# Patient Record
Sex: MALE | Race: WHITE | Employment: OTHER | URBAN - METROPOLITAN AREA
[De-identification: names, ages, dates, MRNs, and addresses within clinical notes are randomized per-mention and may not be internally consistent; named-entity substitution may affect disease eponyms.]

---

## 2017-01-25 ENCOUNTER — OFFICE VISIT (OUTPATIENT)
Dept: FAMILY MEDICINE CLINIC | Age: 67
End: 2017-01-25

## 2017-01-25 VITALS
BODY MASS INDEX: 19.84 KG/M2 | RESPIRATION RATE: 16 BRPM | OXYGEN SATURATION: 92 % | HEIGHT: 70 IN | SYSTOLIC BLOOD PRESSURE: 124 MMHG | HEART RATE: 71 BPM | DIASTOLIC BLOOD PRESSURE: 82 MMHG | WEIGHT: 138.6 LBS

## 2017-01-25 DIAGNOSIS — F32.4 DEPRESSION, MAJOR, SINGLE EPISODE, IN PARTIAL REMISSION (HCC): ICD-10-CM

## 2017-01-25 DIAGNOSIS — F42.4 SKIN PICKING HABIT: ICD-10-CM

## 2017-01-25 DIAGNOSIS — F41.9 ANXIETY: Primary | ICD-10-CM

## 2017-01-25 DIAGNOSIS — F33.2 SEVERE RECURRENT MAJOR DEPRESSION WITHOUT PSYCHOTIC FEATURES (HCC): ICD-10-CM

## 2017-01-25 PROCEDURE — G8599 NO ASA/ANTIPLAT THER USE RNG: HCPCS | Performed by: NURSE PRACTITIONER

## 2017-01-25 PROCEDURE — 1123F ACP DISCUSS/DSCN MKR DOCD: CPT | Performed by: NURSE PRACTITIONER

## 2017-01-25 PROCEDURE — 1036F TOBACCO NON-USER: CPT | Performed by: NURSE PRACTITIONER

## 2017-01-25 PROCEDURE — 3017F COLORECTAL CA SCREEN DOC REV: CPT | Performed by: NURSE PRACTITIONER

## 2017-01-25 PROCEDURE — G8484 FLU IMMUNIZE NO ADMIN: HCPCS | Performed by: NURSE PRACTITIONER

## 2017-01-25 PROCEDURE — 99213 OFFICE O/P EST LOW 20 MIN: CPT | Performed by: NURSE PRACTITIONER

## 2017-01-25 PROCEDURE — 4040F PNEUMOC VAC/ADMIN/RCVD: CPT | Performed by: NURSE PRACTITIONER

## 2017-01-25 PROCEDURE — G8419 CALC BMI OUT NRM PARAM NOF/U: HCPCS | Performed by: NURSE PRACTITIONER

## 2017-01-25 PROCEDURE — G8427 DOCREV CUR MEDS BY ELIG CLIN: HCPCS | Performed by: NURSE PRACTITIONER

## 2017-01-25 RX ORDER — CYCLOBENZAPRINE HCL 10 MG
TABLET ORAL
Qty: 30 TABLET | Refills: 5 | Status: SHIPPED | OUTPATIENT
Start: 2017-01-25 | End: 2018-06-14 | Stop reason: SDUPTHER

## 2017-01-25 RX ORDER — LORAZEPAM 1 MG/1
1 TABLET ORAL EVERY 6 HOURS PRN
Qty: 30 TABLET | Refills: 2 | Status: CANCELLED | OUTPATIENT
Start: 2017-01-25 | End: 2017-02-24

## 2017-01-25 RX ORDER — HYDROXYZINE 50 MG/1
TABLET, FILM COATED ORAL
Qty: 30 TABLET | Refills: 5 | Status: SHIPPED | OUTPATIENT
Start: 2017-01-25 | End: 2017-07-07 | Stop reason: SDUPTHER

## 2017-01-25 RX ORDER — LORAZEPAM 1 MG/1
1 TABLET ORAL EVERY 6 HOURS PRN
Qty: 30 TABLET | Refills: 2 | Status: SHIPPED | OUTPATIENT
Start: 2017-01-25 | End: 2017-02-24

## 2017-01-25 RX ORDER — DULOXETIN HYDROCHLORIDE 60 MG/1
CAPSULE, DELAYED RELEASE ORAL
Qty: 60 CAPSULE | Refills: 5 | Status: SHIPPED | OUTPATIENT
Start: 2017-01-25 | End: 2017-02-24

## 2017-02-24 ENCOUNTER — OFFICE VISIT (OUTPATIENT)
Dept: FAMILY MEDICINE CLINIC | Age: 67
End: 2017-02-24

## 2017-02-24 VITALS
HEIGHT: 70 IN | RESPIRATION RATE: 16 BRPM | DIASTOLIC BLOOD PRESSURE: 62 MMHG | BODY MASS INDEX: 18.61 KG/M2 | SYSTOLIC BLOOD PRESSURE: 100 MMHG | WEIGHT: 130 LBS | HEART RATE: 53 BPM | OXYGEN SATURATION: 94 %

## 2017-02-24 DIAGNOSIS — I20.9 ANGINA PECTORIS (HCC): ICD-10-CM

## 2017-02-24 DIAGNOSIS — F33.2 SEVERE RECURRENT MAJOR DEPRESSION WITHOUT PSYCHOTIC FEATURES (HCC): ICD-10-CM

## 2017-02-24 DIAGNOSIS — F10.29 ALCOHOL DEPENDENCE WITH UNSPECIFIED ALCOHOL-INDUCED DISORDER (HCC): ICD-10-CM

## 2017-02-24 DIAGNOSIS — R10.9 RIGHT FLANK PAIN: ICD-10-CM

## 2017-02-24 DIAGNOSIS — M25.552 LEFT HIP PAIN: Primary | ICD-10-CM

## 2017-02-24 DIAGNOSIS — I25.10 CORONARY ARTERY DISEASE INVOLVING NATIVE HEART WITHOUT ANGINA PECTORIS, UNSPECIFIED VESSEL OR LESION TYPE: ICD-10-CM

## 2017-02-24 DIAGNOSIS — R35.0 URINE FREQUENCY: ICD-10-CM

## 2017-02-24 LAB
BILIRUBIN, POC: ABNORMAL
BLOOD URINE, POC: ABNORMAL
CLARITY, POC: CLEAR
COLOR, POC: YELLOW
GLUCOSE URINE, POC: ABNORMAL
KETONES, POC: ABNORMAL
LEUKOCYTE EST, POC: ABNORMAL
NITRITE, POC: ABNORMAL
PH, POC: 6
PROTEIN, POC: ABNORMAL
SPECIFIC GRAVITY, POC: >=1.03
UROBILINOGEN, POC: 0.2

## 2017-02-24 PROCEDURE — G8419 CALC BMI OUT NRM PARAM NOF/U: HCPCS | Performed by: NURSE PRACTITIONER

## 2017-02-24 PROCEDURE — 99214 OFFICE O/P EST MOD 30 MIN: CPT | Performed by: NURSE PRACTITIONER

## 2017-02-24 PROCEDURE — 1123F ACP DISCUSS/DSCN MKR DOCD: CPT | Performed by: NURSE PRACTITIONER

## 2017-02-24 PROCEDURE — G8599 NO ASA/ANTIPLAT THER USE RNG: HCPCS | Performed by: NURSE PRACTITIONER

## 2017-02-24 PROCEDURE — 4040F PNEUMOC VAC/ADMIN/RCVD: CPT | Performed by: NURSE PRACTITIONER

## 2017-02-24 PROCEDURE — G8427 DOCREV CUR MEDS BY ELIG CLIN: HCPCS | Performed by: NURSE PRACTITIONER

## 2017-02-24 PROCEDURE — 3017F COLORECTAL CA SCREEN DOC REV: CPT | Performed by: NURSE PRACTITIONER

## 2017-02-24 PROCEDURE — 1036F TOBACCO NON-USER: CPT | Performed by: NURSE PRACTITIONER

## 2017-02-24 PROCEDURE — 81002 URINALYSIS NONAUTO W/O SCOPE: CPT | Performed by: NURSE PRACTITIONER

## 2017-02-24 PROCEDURE — G8484 FLU IMMUNIZE NO ADMIN: HCPCS | Performed by: NURSE PRACTITIONER

## 2017-02-24 RX ORDER — VENLAFAXINE HYDROCHLORIDE 150 MG/1
150 CAPSULE, EXTENDED RELEASE ORAL DAILY
Qty: 30 CAPSULE | Refills: 2 | Status: SHIPPED | OUTPATIENT
Start: 2017-02-24 | End: 2017-06-20 | Stop reason: SDUPTHER

## 2017-02-24 RX ORDER — PREDNISONE 10 MG/1
TABLET ORAL
Qty: 30 TABLET | Refills: 0 | Status: ON HOLD | OUTPATIENT
Start: 2017-02-24 | End: 2017-03-21 | Stop reason: HOSPADM

## 2017-02-24 RX ORDER — OXYCODONE HYDROCHLORIDE AND ACETAMINOPHEN 5; 325 MG/1; MG/1
1 TABLET ORAL EVERY 4 HOURS PRN
Qty: 30 TABLET | Refills: 0 | Status: SHIPPED | OUTPATIENT
Start: 2017-02-24 | End: 2017-03-13 | Stop reason: SDUPTHER

## 2017-03-06 ENCOUNTER — TELEPHONE (OUTPATIENT)
Dept: ORTHOPEDIC SURGERY | Age: 67
End: 2017-03-06

## 2017-03-06 ENCOUNTER — OFFICE VISIT (OUTPATIENT)
Dept: ORTHOPEDIC SURGERY | Age: 67
End: 2017-03-06

## 2017-03-06 VITALS
HEART RATE: 50 BPM | SYSTOLIC BLOOD PRESSURE: 159 MMHG | WEIGHT: 135 LBS | DIASTOLIC BLOOD PRESSURE: 82 MMHG | BODY MASS INDEX: 19.33 KG/M2 | HEIGHT: 70 IN

## 2017-03-06 DIAGNOSIS — M54.10 RADICULAR PAIN: ICD-10-CM

## 2017-03-06 DIAGNOSIS — M25.552 LEFT HIP PAIN: Primary | ICD-10-CM

## 2017-03-06 PROCEDURE — G8419 CALC BMI OUT NRM PARAM NOF/U: HCPCS | Performed by: ORTHOPAEDIC SURGERY

## 2017-03-06 PROCEDURE — G8484 FLU IMMUNIZE NO ADMIN: HCPCS | Performed by: ORTHOPAEDIC SURGERY

## 2017-03-06 PROCEDURE — G8599 NO ASA/ANTIPLAT THER USE RNG: HCPCS | Performed by: ORTHOPAEDIC SURGERY

## 2017-03-06 PROCEDURE — 73502 X-RAY EXAM HIP UNI 2-3 VIEWS: CPT | Performed by: ORTHOPAEDIC SURGERY

## 2017-03-06 PROCEDURE — 99203 OFFICE O/P NEW LOW 30 MIN: CPT | Performed by: ORTHOPAEDIC SURGERY

## 2017-03-06 PROCEDURE — 1036F TOBACCO NON-USER: CPT | Performed by: ORTHOPAEDIC SURGERY

## 2017-03-06 PROCEDURE — 3017F COLORECTAL CA SCREEN DOC REV: CPT | Performed by: ORTHOPAEDIC SURGERY

## 2017-03-06 PROCEDURE — 4040F PNEUMOC VAC/ADMIN/RCVD: CPT | Performed by: ORTHOPAEDIC SURGERY

## 2017-03-06 PROCEDURE — 1123F ACP DISCUSS/DSCN MKR DOCD: CPT | Performed by: ORTHOPAEDIC SURGERY

## 2017-03-06 PROCEDURE — 72100 X-RAY EXAM L-S SPINE 2/3 VWS: CPT | Performed by: ORTHOPAEDIC SURGERY

## 2017-03-06 PROCEDURE — G8427 DOCREV CUR MEDS BY ELIG CLIN: HCPCS | Performed by: ORTHOPAEDIC SURGERY

## 2017-03-08 ENCOUNTER — HOSPITAL ENCOUNTER (OUTPATIENT)
Dept: MRI IMAGING | Age: 67
Discharge: HOME | End: 2017-03-08
Attending: ORTHOPAEDIC SURGERY | Admitting: ORTHOPAEDIC SURGERY

## 2017-03-08 ENCOUNTER — TELEPHONE (OUTPATIENT)
Dept: ORTHOPEDIC SURGERY | Age: 67
End: 2017-03-08

## 2017-03-08 DIAGNOSIS — C90.00 MULTIPLE MYELOMA NOT HAVING ACHIEVED REMISSION (HCC): ICD-10-CM

## 2017-03-08 DIAGNOSIS — M54.10 RADICULAR PAIN: ICD-10-CM

## 2017-03-08 DIAGNOSIS — C90.00 MULTIPLE MYELOMA, REMISSION STATUS UNSPECIFIED (HCC): Primary | ICD-10-CM

## 2017-03-08 DIAGNOSIS — M54.10 RADICULOPATHY: ICD-10-CM

## 2017-03-08 RX ORDER — ALENDRONATE SODIUM 70 MG/1
70 TABLET ORAL
Qty: 4 TABLET | Refills: 3 | Status: ON HOLD | OUTPATIENT
Start: 2017-03-08 | End: 2017-03-21 | Stop reason: HOSPADM

## 2017-03-09 ENCOUNTER — TELEPHONE (OUTPATIENT)
Dept: ORTHOPEDIC SURGERY | Age: 67
End: 2017-03-09

## 2017-03-13 ENCOUNTER — TELEPHONE (OUTPATIENT)
Dept: FAMILY MEDICINE CLINIC | Age: 67
End: 2017-03-13

## 2017-03-13 RX ORDER — OXYCODONE HYDROCHLORIDE AND ACETAMINOPHEN 5; 325 MG/1; MG/1
1 TABLET ORAL EVERY 4 HOURS PRN
Qty: 120 TABLET | Refills: 0 | Status: ON HOLD | OUTPATIENT
Start: 2017-03-13 | End: 2017-03-21 | Stop reason: HOSPADM

## 2017-03-14 ENCOUNTER — HOSPITAL ENCOUNTER (OUTPATIENT)
Dept: ULTRASOUND IMAGING | Age: 67
Discharge: HOME | End: 2017-03-14
Attending: ORTHOPAEDIC SURGERY | Admitting: ORTHOPAEDIC SURGERY

## 2017-03-14 DIAGNOSIS — C90.00 MULTIPLE MYELOMA, WITHOUT MENTION OF HAVING ACHIEVED REMISSION: ICD-10-CM

## 2017-03-14 DIAGNOSIS — C90.00 MULTIPLE MYELOMA, REMISSION STATUS UNSPECIFIED (HCC): ICD-10-CM

## 2017-03-14 DIAGNOSIS — N13.30 HYDRONEPHROSIS, RIGHT: ICD-10-CM

## 2017-03-14 DIAGNOSIS — C90.00 MULTIPLE MYELOMA NOT HAVING ACHIEVED REMISSION (HCC): ICD-10-CM

## 2017-03-16 PROBLEM — M89.8X9 BONE PAIN: Status: ACTIVE | Noted: 2017-03-16

## 2017-03-16 PROBLEM — M89.9 LYTIC BONE LESIONS ON XRAY: Status: ACTIVE | Noted: 2017-03-16

## 2017-03-16 PROBLEM — R63.4 ABNORMAL WEIGHT LOSS: Status: ACTIVE | Noted: 2017-03-16

## 2017-03-16 PROBLEM — M25.552 LEFT HIP PAIN: Status: ACTIVE | Noted: 2017-03-16

## 2017-03-16 PROBLEM — N13.30 HYDRONEPHROSIS OF RIGHT KIDNEY: Status: ACTIVE | Noted: 2017-03-16

## 2017-03-16 PROBLEM — R29.898 LEFT LEG WEAKNESS: Status: ACTIVE | Noted: 2017-03-16

## 2017-03-22 ENCOUNTER — CARE COORDINATION (OUTPATIENT)
Dept: CASE MANAGEMENT | Age: 67
End: 2017-03-22

## 2017-03-23 ENCOUNTER — CARE COORDINATION (OUTPATIENT)
Dept: CASE MANAGEMENT | Age: 67
End: 2017-03-23

## 2017-03-23 ENCOUNTER — TELEPHONE (OUTPATIENT)
Dept: PHARMACY | Age: 67
End: 2017-03-23

## 2017-03-24 ENCOUNTER — EPISODE CHANGES (OUTPATIENT)
Dept: CASE MANAGEMENT | Age: 67
End: 2017-03-24

## 2017-03-24 ENCOUNTER — CARE COORDINATION (OUTPATIENT)
Dept: CASE MANAGEMENT | Age: 67
End: 2017-03-24

## 2017-03-27 PROBLEM — C90.00 MULTIPLE MYELOMA NOT HAVING ACHIEVED REMISSION (HCC): Status: RESOLVED | Noted: 2017-03-08 | Resolved: 2017-03-27

## 2017-03-27 PROBLEM — C61 PROSTATE CANCER (HCC): Status: ACTIVE | Noted: 2017-03-27

## 2017-03-28 PROBLEM — C61 PROSTATE CANCER METASTATIC TO BONE (HCC): Status: ACTIVE | Noted: 2017-03-28

## 2017-03-28 PROBLEM — K59.03 DRUG-INDUCED CONSTIPATION: Status: ACTIVE | Noted: 2017-03-28

## 2017-03-28 PROBLEM — C79.51 PROSTATE CANCER METASTATIC TO BONE (HCC): Status: ACTIVE | Noted: 2017-03-28

## 2017-03-28 PROBLEM — R63.0 ANOREXIA: Status: ACTIVE | Noted: 2017-03-28

## 2017-03-28 PROBLEM — G89.3 NEOPLASM RELATED PAIN: Status: ACTIVE | Noted: 2017-03-28

## 2017-04-05 PROBLEM — C79.51 BONE METASTASIS (HCC): Status: ACTIVE | Noted: 2017-04-05

## 2017-04-08 ENCOUNTER — CARE COORDINATION (OUTPATIENT)
Dept: CASE MANAGEMENT | Age: 67
End: 2017-04-08

## 2017-04-09 ENCOUNTER — CARE COORDINATION (OUTPATIENT)
Dept: CASE MANAGEMENT | Age: 67
End: 2017-04-09

## 2017-04-10 ENCOUNTER — CARE COORDINATION (OUTPATIENT)
Dept: CASE MANAGEMENT | Age: 67
End: 2017-04-10

## 2017-04-10 ENCOUNTER — EPISODE CHANGES (OUTPATIENT)
Dept: CASE MANAGEMENT | Age: 67
End: 2017-04-10

## 2017-04-19 RX ORDER — ATORVASTATIN CALCIUM 40 MG/1
TABLET, FILM COATED ORAL
Qty: 30 TABLET | Refills: 2 | Status: SHIPPED | OUTPATIENT
Start: 2017-04-19 | End: 2017-08-02 | Stop reason: SDUPTHER

## 2017-04-20 ENCOUNTER — TELEPHONE (OUTPATIENT)
Dept: FAMILY MEDICINE CLINIC | Age: 67
End: 2017-04-20

## 2017-04-20 DIAGNOSIS — L29.9 PRURITUS: Primary | ICD-10-CM

## 2017-04-24 ENCOUNTER — HOSPITAL ENCOUNTER (OUTPATIENT)
Dept: OTHER | Age: 67
Discharge: HOME | End: 2017-04-24
Attending: INTERNAL MEDICINE | Admitting: INTERNAL MEDICINE

## 2017-04-24 DIAGNOSIS — R52 PAIN: ICD-10-CM

## 2017-04-24 DIAGNOSIS — C79.51 PROSTATE CANCER METASTATIC TO BONE (HCC): ICD-10-CM

## 2017-04-24 DIAGNOSIS — C61 PROSTATE CANCER METASTATIC TO BONE (HCC): ICD-10-CM

## 2017-04-25 ENCOUNTER — OFFICE VISIT (OUTPATIENT)
Dept: ORTHOPEDIC SURGERY | Age: 67
End: 2017-04-25

## 2017-04-25 VITALS
HEIGHT: 69 IN | HEART RATE: 76 BPM | DIASTOLIC BLOOD PRESSURE: 62 MMHG | WEIGHT: 129 LBS | SYSTOLIC BLOOD PRESSURE: 102 MMHG | BODY MASS INDEX: 19.11 KG/M2

## 2017-04-25 DIAGNOSIS — C79.51 PROSTATE CANCER METASTATIC TO BONE (HCC): ICD-10-CM

## 2017-04-25 DIAGNOSIS — C61 PROSTATE CANCER METASTATIC TO BONE (HCC): ICD-10-CM

## 2017-04-25 DIAGNOSIS — C79.51 BONE METASTASIS (HCC): Primary | ICD-10-CM

## 2017-04-25 PROCEDURE — G8427 DOCREV CUR MEDS BY ELIG CLIN: HCPCS | Performed by: ORTHOPAEDIC SURGERY

## 2017-04-25 PROCEDURE — 1111F DSCHRG MED/CURRENT MED MERGE: CPT | Performed by: ORTHOPAEDIC SURGERY

## 2017-04-25 PROCEDURE — 1123F ACP DISCUSS/DSCN MKR DOCD: CPT | Performed by: ORTHOPAEDIC SURGERY

## 2017-04-25 PROCEDURE — 99213 OFFICE O/P EST LOW 20 MIN: CPT | Performed by: ORTHOPAEDIC SURGERY

## 2017-04-25 PROCEDURE — 3017F COLORECTAL CA SCREEN DOC REV: CPT | Performed by: ORTHOPAEDIC SURGERY

## 2017-04-25 PROCEDURE — 4040F PNEUMOC VAC/ADMIN/RCVD: CPT | Performed by: ORTHOPAEDIC SURGERY

## 2017-04-25 PROCEDURE — G8419 CALC BMI OUT NRM PARAM NOF/U: HCPCS | Performed by: ORTHOPAEDIC SURGERY

## 2017-04-25 PROCEDURE — 1036F TOBACCO NON-USER: CPT | Performed by: ORTHOPAEDIC SURGERY

## 2017-04-25 PROCEDURE — G8598 ASA/ANTIPLAT THER USED: HCPCS | Performed by: ORTHOPAEDIC SURGERY

## 2017-05-23 PROBLEM — R53.1 WEAKNESS: Status: ACTIVE | Noted: 2017-05-23

## 2017-05-30 PROBLEM — R60.0 BILATERAL EDEMA OF LOWER EXTREMITY: Status: ACTIVE | Noted: 2017-05-30

## 2017-05-31 ENCOUNTER — HOSPITAL ENCOUNTER (OUTPATIENT)
Dept: VASCULAR LAB | Age: 67
Discharge: HOME | End: 2017-06-01
Attending: NURSE PRACTITIONER | Admitting: NURSE PRACTITIONER

## 2017-05-31 DIAGNOSIS — I83.893 VARICOSE VEINS OF BILATERAL LOWER EXTREMITIES WITH OTHER COMPLICATIONS: ICD-10-CM

## 2017-06-02 PROBLEM — R19.7 DIARRHEA: Status: ACTIVE | Noted: 2017-06-02

## 2017-06-07 ENCOUNTER — CARE COORDINATION (OUTPATIENT)
Dept: CASE MANAGEMENT | Age: 67
End: 2017-06-07

## 2017-06-08 ENCOUNTER — TELEPHONE (OUTPATIENT)
Dept: PHARMACY | Age: 67
End: 2017-06-08

## 2017-06-09 ENCOUNTER — CARE COORDINATION (OUTPATIENT)
Dept: CASE MANAGEMENT | Age: 67
End: 2017-06-09

## 2017-06-09 PROBLEM — K58.0 IRRITABLE BOWEL SYNDROME WITH DIARRHEA: Status: ACTIVE | Noted: 2017-06-09

## 2017-06-12 ENCOUNTER — HOSPITAL ENCOUNTER (OUTPATIENT)
Dept: VASCULAR LAB | Age: 67
Discharge: HOME | End: 2017-06-12
Attending: INTERNAL MEDICINE | Admitting: INTERNAL MEDICINE

## 2017-06-12 DIAGNOSIS — C61 MALIGNANT NEOPLASM OF PROSTATE (HCC): ICD-10-CM

## 2017-06-12 DIAGNOSIS — R60.0 BILATERAL EDEMA OF LOWER EXTREMITY: ICD-10-CM

## 2017-06-12 DIAGNOSIS — C61 PROSTATE CANCER (HCC): ICD-10-CM

## 2017-06-20 ENCOUNTER — TELEPHONE (OUTPATIENT)
Dept: FAMILY MEDICINE CLINIC | Age: 67
End: 2017-06-20

## 2017-06-20 RX ORDER — VENLAFAXINE HYDROCHLORIDE 150 MG/1
150 CAPSULE, EXTENDED RELEASE ORAL 2 TIMES DAILY
Qty: 60 CAPSULE | Refills: 0 | Status: SHIPPED | OUTPATIENT
Start: 2017-06-20 | End: 2017-07-07 | Stop reason: DRUGHIGH

## 2017-06-30 ENCOUNTER — OFFICE VISIT (OUTPATIENT)
Dept: VASCULAR SURGERY | Age: 67
End: 2017-06-30

## 2017-06-30 VITALS
DIASTOLIC BLOOD PRESSURE: 58 MMHG | WEIGHT: 118 LBS | BODY MASS INDEX: 17.48 KG/M2 | SYSTOLIC BLOOD PRESSURE: 124 MMHG | HEIGHT: 69 IN

## 2017-06-30 DIAGNOSIS — R60.0 BILATERAL LEG EDEMA: Primary | ICD-10-CM

## 2017-06-30 PROBLEM — R94.31 PROLONGED Q-T INTERVAL ON ECG: Status: ACTIVE | Noted: 2017-06-01

## 2017-06-30 PROCEDURE — G8598 ASA/ANTIPLAT THER USED: HCPCS | Performed by: SURGERY

## 2017-06-30 PROCEDURE — 1123F ACP DISCUSS/DSCN MKR DOCD: CPT | Performed by: SURGERY

## 2017-06-30 PROCEDURE — G8427 DOCREV CUR MEDS BY ELIG CLIN: HCPCS | Performed by: SURGERY

## 2017-06-30 PROCEDURE — 1111F DSCHRG MED/CURRENT MED MERGE: CPT | Performed by: SURGERY

## 2017-06-30 PROCEDURE — 4040F PNEUMOC VAC/ADMIN/RCVD: CPT | Performed by: SURGERY

## 2017-06-30 PROCEDURE — 99203 OFFICE O/P NEW LOW 30 MIN: CPT | Performed by: SURGERY

## 2017-06-30 PROCEDURE — 1036F TOBACCO NON-USER: CPT | Performed by: SURGERY

## 2017-06-30 PROCEDURE — 3017F COLORECTAL CA SCREEN DOC REV: CPT | Performed by: SURGERY

## 2017-06-30 PROCEDURE — G8419 CALC BMI OUT NRM PARAM NOF/U: HCPCS | Performed by: SURGERY

## 2017-07-05 ENCOUNTER — TELEPHONE (OUTPATIENT)
Dept: CARDIOLOGY CLINIC | Age: 67
End: 2017-07-05

## 2017-07-07 ENCOUNTER — OFFICE VISIT (OUTPATIENT)
Dept: FAMILY MEDICINE CLINIC | Age: 67
End: 2017-07-07

## 2017-07-07 VITALS
BODY MASS INDEX: 17.77 KG/M2 | SYSTOLIC BLOOD PRESSURE: 122 MMHG | WEIGHT: 120 LBS | DIASTOLIC BLOOD PRESSURE: 80 MMHG | HEART RATE: 76 BPM | RESPIRATION RATE: 16 BRPM | OXYGEN SATURATION: 98 % | HEIGHT: 69 IN

## 2017-07-07 DIAGNOSIS — R06.02 SHORTNESS OF BREATH: ICD-10-CM

## 2017-07-07 DIAGNOSIS — F32.2 SEVERE MAJOR DEPRESSION (HCC): Primary | ICD-10-CM

## 2017-07-07 DIAGNOSIS — F41.9 ANXIETY: ICD-10-CM

## 2017-07-07 DIAGNOSIS — C61 PROSTATE CANCER (HCC): ICD-10-CM

## 2017-07-07 DIAGNOSIS — F42.4 SKIN PICKING HABIT: ICD-10-CM

## 2017-07-07 DIAGNOSIS — F10.29 ALCOHOL DEPENDENCE WITH UNSPECIFIED ALCOHOL-INDUCED DISORDER (HCC): ICD-10-CM

## 2017-07-07 PROCEDURE — G8598 ASA/ANTIPLAT THER USED: HCPCS | Performed by: NURSE PRACTITIONER

## 2017-07-07 PROCEDURE — 99215 OFFICE O/P EST HI 40 MIN: CPT | Performed by: NURSE PRACTITIONER

## 2017-07-07 PROCEDURE — 4040F PNEUMOC VAC/ADMIN/RCVD: CPT | Performed by: NURSE PRACTITIONER

## 2017-07-07 PROCEDURE — G8419 CALC BMI OUT NRM PARAM NOF/U: HCPCS | Performed by: NURSE PRACTITIONER

## 2017-07-07 PROCEDURE — G8427 DOCREV CUR MEDS BY ELIG CLIN: HCPCS | Performed by: NURSE PRACTITIONER

## 2017-07-07 PROCEDURE — 1123F ACP DISCUSS/DSCN MKR DOCD: CPT | Performed by: NURSE PRACTITIONER

## 2017-07-07 PROCEDURE — 3017F COLORECTAL CA SCREEN DOC REV: CPT | Performed by: NURSE PRACTITIONER

## 2017-07-07 PROCEDURE — 4004F PT TOBACCO SCREEN RCVD TLK: CPT | Performed by: NURSE PRACTITIONER

## 2017-07-07 RX ORDER — DOCUSATE SODIUM 100 MG/1
100 CAPSULE, LIQUID FILLED ORAL DAILY PRN
COMMUNITY
End: 2017-07-11 | Stop reason: ALTCHOICE

## 2017-07-07 RX ORDER — HYDROXYZINE 50 MG/1
50 TABLET, FILM COATED ORAL EVERY 6 HOURS PRN
Qty: 60 TABLET | Refills: 5 | Status: SHIPPED | OUTPATIENT
Start: 2017-07-07 | End: 2017-07-12 | Stop reason: SDUPTHER

## 2017-07-07 RX ORDER — VENLAFAXINE HYDROCHLORIDE 150 MG/1
300 CAPSULE, EXTENDED RELEASE ORAL DAILY
Qty: 60 CAPSULE | Refills: 0 | Status: SHIPPED
Start: 2017-07-07 | End: 2017-07-25 | Stop reason: SDUPTHER

## 2017-07-07 RX ORDER — ARIPIPRAZOLE 10 MG/1
10 TABLET ORAL DAILY
Qty: 30 TABLET | Refills: 3 | Status: SHIPPED | OUTPATIENT
Start: 2017-07-07 | End: 2017-10-27 | Stop reason: SDUPTHER

## 2017-07-11 ENCOUNTER — TELEPHONE (OUTPATIENT)
Dept: FAMILY MEDICINE CLINIC | Age: 67
End: 2017-07-11

## 2017-07-11 ENCOUNTER — OFFICE VISIT (OUTPATIENT)
Dept: ENT CLINIC | Age: 67
End: 2017-07-11

## 2017-07-11 VITALS
HEIGHT: 69 IN | WEIGHT: 120 LBS | BODY MASS INDEX: 17.77 KG/M2 | HEART RATE: 58 BPM | DIASTOLIC BLOOD PRESSURE: 74 MMHG | SYSTOLIC BLOOD PRESSURE: 126 MMHG

## 2017-07-11 DIAGNOSIS — H93.13 SUBJECTIVE TINNITUS OF BOTH EARS: Primary | ICD-10-CM

## 2017-07-11 DIAGNOSIS — F42.4 SKIN PICKING HABIT: ICD-10-CM

## 2017-07-11 DIAGNOSIS — H91.93 HEARING LOSS OF BOTH EARS: ICD-10-CM

## 2017-07-11 PROCEDURE — 99203 OFFICE O/P NEW LOW 30 MIN: CPT | Performed by: OTOLARYNGOLOGY

## 2017-07-11 PROCEDURE — G8598 ASA/ANTIPLAT THER USED: HCPCS | Performed by: OTOLARYNGOLOGY

## 2017-07-11 PROCEDURE — 4040F PNEUMOC VAC/ADMIN/RCVD: CPT | Performed by: OTOLARYNGOLOGY

## 2017-07-11 PROCEDURE — G8427 DOCREV CUR MEDS BY ELIG CLIN: HCPCS | Performed by: OTOLARYNGOLOGY

## 2017-07-11 PROCEDURE — G8419 CALC BMI OUT NRM PARAM NOF/U: HCPCS | Performed by: OTOLARYNGOLOGY

## 2017-07-11 PROCEDURE — 1123F ACP DISCUSS/DSCN MKR DOCD: CPT | Performed by: OTOLARYNGOLOGY

## 2017-07-11 PROCEDURE — 3017F COLORECTAL CA SCREEN DOC REV: CPT | Performed by: OTOLARYNGOLOGY

## 2017-07-11 PROCEDURE — 4004F PT TOBACCO SCREEN RCVD TLK: CPT | Performed by: OTOLARYNGOLOGY

## 2017-07-12 RX ORDER — HYDROXYZINE 50 MG/1
50 TABLET, FILM COATED ORAL EVERY 6 HOURS PRN
Qty: 60 TABLET | Refills: 5 | Status: SHIPPED | OUTPATIENT
Start: 2017-07-12 | End: 2019-01-01 | Stop reason: ALTCHOICE

## 2017-07-13 ENCOUNTER — OFFICE VISIT (OUTPATIENT)
Dept: ENT CLINIC | Age: 67
End: 2017-07-13

## 2017-07-13 DIAGNOSIS — H93.13 TINNITUS OF BOTH EARS: Primary | ICD-10-CM

## 2017-07-13 PROCEDURE — 99999 PR OFFICE/OUTPT VISIT,PROCEDURE ONLY: CPT | Performed by: AUDIOLOGIST

## 2017-07-13 PROCEDURE — 4004F PT TOBACCO SCREEN RCVD TLK: CPT | Performed by: AUDIOLOGIST

## 2017-07-13 PROCEDURE — 92557 COMPREHENSIVE HEARING TEST: CPT | Performed by: AUDIOLOGIST

## 2017-07-13 PROCEDURE — 92550 TYMPANOMETRY & REFLEX THRESH: CPT | Performed by: AUDIOLOGIST

## 2017-07-14 ENCOUNTER — OFFICE VISIT (OUTPATIENT)
Dept: ENT CLINIC | Age: 67
End: 2017-07-14

## 2017-07-14 VITALS
WEIGHT: 120 LBS | HEIGHT: 69 IN | DIASTOLIC BLOOD PRESSURE: 83 MMHG | BODY MASS INDEX: 17.77 KG/M2 | SYSTOLIC BLOOD PRESSURE: 123 MMHG | HEART RATE: 98 BPM

## 2017-07-14 DIAGNOSIS — H90.3 BILATERAL SENSORINEURAL HEARING LOSS: ICD-10-CM

## 2017-07-14 DIAGNOSIS — H93.13 TINNITUS OF BOTH EARS: Primary | ICD-10-CM

## 2017-07-14 PROBLEM — F41.9 ANXIETY: Status: ACTIVE | Noted: 2017-07-14

## 2017-07-14 PROCEDURE — G8419 CALC BMI OUT NRM PARAM NOF/U: HCPCS | Performed by: OTOLARYNGOLOGY

## 2017-07-14 PROCEDURE — 4004F PT TOBACCO SCREEN RCVD TLK: CPT | Performed by: OTOLARYNGOLOGY

## 2017-07-14 PROCEDURE — 99214 OFFICE O/P EST MOD 30 MIN: CPT | Performed by: OTOLARYNGOLOGY

## 2017-07-14 PROCEDURE — 1123F ACP DISCUSS/DSCN MKR DOCD: CPT | Performed by: OTOLARYNGOLOGY

## 2017-07-14 PROCEDURE — 4040F PNEUMOC VAC/ADMIN/RCVD: CPT | Performed by: OTOLARYNGOLOGY

## 2017-07-14 PROCEDURE — 3017F COLORECTAL CA SCREEN DOC REV: CPT | Performed by: OTOLARYNGOLOGY

## 2017-07-14 PROCEDURE — G8598 ASA/ANTIPLAT THER USED: HCPCS | Performed by: OTOLARYNGOLOGY

## 2017-07-14 PROCEDURE — G8427 DOCREV CUR MEDS BY ELIG CLIN: HCPCS | Performed by: OTOLARYNGOLOGY

## 2017-07-21 ENCOUNTER — HOSPITAL ENCOUNTER (OUTPATIENT)
Dept: NUCLEAR MEDICINE | Age: 67
Discharge: HOME | End: 2017-07-21
Attending: INTERNAL MEDICINE | Admitting: INTERNAL MEDICINE

## 2017-07-21 DIAGNOSIS — C79.51 BONE METASTASIS (HCC): ICD-10-CM

## 2017-07-21 DIAGNOSIS — I83.893 VARICOSE VEINS OF BILATERAL LOWER EXTREMITIES WITH OTHER COMPLICATIONS: ICD-10-CM

## 2017-07-21 DIAGNOSIS — C61 PROSTATE CANCER (HCC): ICD-10-CM

## 2017-07-21 RX ORDER — TC 99M MEDRONATE 20 MG/10ML
25 INJECTION, POWDER, LYOPHILIZED, FOR SOLUTION INTRAVENOUS
Status: COMPLETED | OUTPATIENT
Start: 2017-07-21 | End: 2017-07-21

## 2017-07-21 RX ADMIN — TC 99M MEDRONATE 25 MILLICURIE: 20 INJECTION, POWDER, LYOPHILIZED, FOR SOLUTION INTRAVENOUS at 09:59

## 2017-07-25 ENCOUNTER — TELEPHONE (OUTPATIENT)
Dept: FAMILY MEDICINE CLINIC | Age: 67
End: 2017-07-25

## 2017-07-25 RX ORDER — VENLAFAXINE HYDROCHLORIDE 150 MG/1
300 CAPSULE, EXTENDED RELEASE ORAL DAILY
Qty: 60 CAPSULE | Refills: 0 | Status: SHIPPED | OUTPATIENT
Start: 2017-07-25 | End: 2017-08-22 | Stop reason: SDUPTHER

## 2017-07-26 ENCOUNTER — CARE COORDINATION (OUTPATIENT)
Dept: CARE COORDINATION | Age: 67
End: 2017-07-26

## 2017-08-02 ENCOUNTER — TELEPHONE (OUTPATIENT)
Dept: FAMILY MEDICINE CLINIC | Age: 67
End: 2017-08-02

## 2017-08-02 RX ORDER — ABIRATERONE ACETATE 250 MG/1
1000 TABLET ORAL DAILY
Qty: 120 TABLET | Refills: 3 | Status: CANCELLED | OUTPATIENT
Start: 2017-08-02

## 2017-08-02 RX ORDER — ATORVASTATIN CALCIUM 40 MG/1
TABLET, FILM COATED ORAL
Qty: 30 TABLET | Refills: 5 | Status: SHIPPED | OUTPATIENT
Start: 2017-08-02 | End: 2017-08-17 | Stop reason: SDUPTHER

## 2017-08-04 ENCOUNTER — OFFICE VISIT (OUTPATIENT)
Dept: FAMILY MEDICINE CLINIC | Age: 67
End: 2017-08-04

## 2017-08-04 VITALS
BODY MASS INDEX: 18.78 KG/M2 | WEIGHT: 126.8 LBS | RESPIRATION RATE: 16 BRPM | DIASTOLIC BLOOD PRESSURE: 78 MMHG | HEART RATE: 70 BPM | HEIGHT: 69 IN | OXYGEN SATURATION: 97 % | SYSTOLIC BLOOD PRESSURE: 112 MMHG

## 2017-08-04 DIAGNOSIS — C61 PROSTATE CANCER (HCC): ICD-10-CM

## 2017-08-04 DIAGNOSIS — N52.2 DRUG-INDUCED ERECTILE DYSFUNCTION: ICD-10-CM

## 2017-08-04 DIAGNOSIS — M89.8X9 BONE PAIN: ICD-10-CM

## 2017-08-04 DIAGNOSIS — F10.29 ALCOHOL DEPENDENCE WITH UNSPECIFIED ALCOHOL-INDUCED DISORDER (HCC): ICD-10-CM

## 2017-08-04 DIAGNOSIS — G89.3 NEOPLASM RELATED PAIN: ICD-10-CM

## 2017-08-04 DIAGNOSIS — F33.2 SEVERE RECURRENT MAJOR DEPRESSION WITHOUT PSYCHOTIC FEATURES (HCC): Primary | ICD-10-CM

## 2017-08-04 PROCEDURE — G8427 DOCREV CUR MEDS BY ELIG CLIN: HCPCS | Performed by: NURSE PRACTITIONER

## 2017-08-04 PROCEDURE — 1123F ACP DISCUSS/DSCN MKR DOCD: CPT | Performed by: NURSE PRACTITIONER

## 2017-08-04 PROCEDURE — G8598 ASA/ANTIPLAT THER USED: HCPCS | Performed by: NURSE PRACTITIONER

## 2017-08-04 PROCEDURE — G8420 CALC BMI NORM PARAMETERS: HCPCS | Performed by: NURSE PRACTITIONER

## 2017-08-04 PROCEDURE — 3017F COLORECTAL CA SCREEN DOC REV: CPT | Performed by: NURSE PRACTITIONER

## 2017-08-04 PROCEDURE — 4040F PNEUMOC VAC/ADMIN/RCVD: CPT | Performed by: NURSE PRACTITIONER

## 2017-08-04 PROCEDURE — 4004F PT TOBACCO SCREEN RCVD TLK: CPT | Performed by: NURSE PRACTITIONER

## 2017-08-04 PROCEDURE — 99214 OFFICE O/P EST MOD 30 MIN: CPT | Performed by: NURSE PRACTITIONER

## 2017-08-07 ENCOUNTER — TELEPHONE (OUTPATIENT)
Dept: ORTHOPEDIC SURGERY | Age: 67
End: 2017-08-07

## 2017-08-17 ENCOUNTER — OFFICE VISIT (OUTPATIENT)
Dept: CARDIOLOGY CLINIC | Age: 67
End: 2017-08-17

## 2017-08-17 VITALS
HEART RATE: 67 BPM | DIASTOLIC BLOOD PRESSURE: 86 MMHG | SYSTOLIC BLOOD PRESSURE: 134 MMHG | BODY MASS INDEX: 19.55 KG/M2 | WEIGHT: 132 LBS | OXYGEN SATURATION: 92 % | HEIGHT: 69 IN

## 2017-08-17 DIAGNOSIS — I25.810 CORONARY ARTERY DISEASE INVOLVING CORONARY BYPASS GRAFT OF NATIVE HEART WITHOUT ANGINA PECTORIS: Primary | ICD-10-CM

## 2017-08-17 DIAGNOSIS — Q21.12 PFO (PATENT FORAMEN OVALE): ICD-10-CM

## 2017-08-17 DIAGNOSIS — E78.2 MIXED HYPERLIPIDEMIA: ICD-10-CM

## 2017-08-17 DIAGNOSIS — Z86.73 HISTORY OF TIA (TRANSIENT ISCHEMIC ATTACK): ICD-10-CM

## 2017-08-17 DIAGNOSIS — F10.11 HISTORY OF ALCOHOL ABUSE: ICD-10-CM

## 2017-08-17 DIAGNOSIS — I10 ESSENTIAL HYPERTENSION: ICD-10-CM

## 2017-08-17 PROCEDURE — 1123F ACP DISCUSS/DSCN MKR DOCD: CPT | Performed by: INTERNAL MEDICINE

## 2017-08-17 PROCEDURE — G8420 CALC BMI NORM PARAMETERS: HCPCS | Performed by: INTERNAL MEDICINE

## 2017-08-17 PROCEDURE — 93000 ELECTROCARDIOGRAM COMPLETE: CPT | Performed by: INTERNAL MEDICINE

## 2017-08-17 PROCEDURE — 3017F COLORECTAL CA SCREEN DOC REV: CPT | Performed by: INTERNAL MEDICINE

## 2017-08-17 PROCEDURE — 99214 OFFICE O/P EST MOD 30 MIN: CPT | Performed by: INTERNAL MEDICINE

## 2017-08-17 PROCEDURE — G8427 DOCREV CUR MEDS BY ELIG CLIN: HCPCS | Performed by: INTERNAL MEDICINE

## 2017-08-17 PROCEDURE — 4040F PNEUMOC VAC/ADMIN/RCVD: CPT | Performed by: INTERNAL MEDICINE

## 2017-08-17 PROCEDURE — G8598 ASA/ANTIPLAT THER USED: HCPCS | Performed by: INTERNAL MEDICINE

## 2017-08-17 PROCEDURE — 4004F PT TOBACCO SCREEN RCVD TLK: CPT | Performed by: INTERNAL MEDICINE

## 2017-08-17 RX ORDER — ATORVASTATIN CALCIUM 40 MG/1
40 TABLET, FILM COATED ORAL DAILY
Qty: 90 TABLET | Refills: 0 | Status: SHIPPED | OUTPATIENT
Start: 2017-08-17 | End: 2017-11-13 | Stop reason: ALTCHOICE

## 2017-08-17 RX ORDER — ATORVASTATIN CALCIUM 20 MG/1
TABLET, FILM COATED ORAL
Qty: 90 TABLET | Refills: 3 | Status: SHIPPED | OUTPATIENT
Start: 2017-08-17 | End: 2017-09-13 | Stop reason: ALTCHOICE

## 2017-08-18 ENCOUNTER — OFFICE VISIT (OUTPATIENT)
Dept: ORTHOPEDIC SURGERY | Age: 67
End: 2017-08-18

## 2017-08-18 VITALS
WEIGHT: 132 LBS | HEART RATE: 71 BPM | HEIGHT: 69 IN | DIASTOLIC BLOOD PRESSURE: 76 MMHG | BODY MASS INDEX: 19.55 KG/M2 | SYSTOLIC BLOOD PRESSURE: 134 MMHG

## 2017-08-18 DIAGNOSIS — C79.51 BONE METASTASIS (HCC): Primary | ICD-10-CM

## 2017-08-18 DIAGNOSIS — C79.51 PROSTATE CANCER METASTATIC TO BONE (HCC): ICD-10-CM

## 2017-08-18 DIAGNOSIS — C61 PROSTATE CANCER METASTATIC TO BONE (HCC): ICD-10-CM

## 2017-08-18 PROCEDURE — 4004F PT TOBACCO SCREEN RCVD TLK: CPT | Performed by: ORTHOPAEDIC SURGERY

## 2017-08-18 PROCEDURE — G8420 CALC BMI NORM PARAMETERS: HCPCS | Performed by: ORTHOPAEDIC SURGERY

## 2017-08-18 PROCEDURE — 99213 OFFICE O/P EST LOW 20 MIN: CPT | Performed by: ORTHOPAEDIC SURGERY

## 2017-08-18 PROCEDURE — G8427 DOCREV CUR MEDS BY ELIG CLIN: HCPCS | Performed by: ORTHOPAEDIC SURGERY

## 2017-08-18 PROCEDURE — G8598 ASA/ANTIPLAT THER USED: HCPCS | Performed by: ORTHOPAEDIC SURGERY

## 2017-08-18 PROCEDURE — 1123F ACP DISCUSS/DSCN MKR DOCD: CPT | Performed by: ORTHOPAEDIC SURGERY

## 2017-08-18 PROCEDURE — 3017F COLORECTAL CA SCREEN DOC REV: CPT | Performed by: ORTHOPAEDIC SURGERY

## 2017-08-18 PROCEDURE — 4040F PNEUMOC VAC/ADMIN/RCVD: CPT | Performed by: ORTHOPAEDIC SURGERY

## 2017-08-22 RX ORDER — VENLAFAXINE HYDROCHLORIDE 150 MG/1
300 CAPSULE, EXTENDED RELEASE ORAL DAILY
Qty: 60 CAPSULE | Refills: 0 | Status: SHIPPED | OUTPATIENT
Start: 2017-08-22 | End: 2017-09-22 | Stop reason: SDUPTHER

## 2017-08-23 ENCOUNTER — TELEPHONE (OUTPATIENT)
Dept: ORTHOPEDIC SURGERY | Age: 67
End: 2017-08-23

## 2017-08-29 ENCOUNTER — TELEPHONE (OUTPATIENT)
Dept: CARDIOLOGY CLINIC | Age: 67
End: 2017-08-29

## 2017-08-31 ENCOUNTER — TELEPHONE (OUTPATIENT)
Dept: CARDIOLOGY CLINIC | Age: 67
End: 2017-08-31

## 2017-09-05 ENCOUNTER — TELEPHONE (OUTPATIENT)
Dept: CARDIOLOGY CLINIC | Age: 67
End: 2017-09-05

## 2017-09-13 ENCOUNTER — OFFICE VISIT (OUTPATIENT)
Dept: FAMILY MEDICINE CLINIC | Age: 67
End: 2017-09-13

## 2017-09-13 VITALS
HEART RATE: 79 BPM | RESPIRATION RATE: 16 BRPM | BODY MASS INDEX: 21.83 KG/M2 | DIASTOLIC BLOOD PRESSURE: 78 MMHG | OXYGEN SATURATION: 98 % | TEMPERATURE: 98.5 F | WEIGHT: 147.4 LBS | HEIGHT: 69 IN | SYSTOLIC BLOOD PRESSURE: 122 MMHG

## 2017-09-13 DIAGNOSIS — R06.02 SOB (SHORTNESS OF BREATH): ICD-10-CM

## 2017-09-13 DIAGNOSIS — R19.7 DIARRHEA, UNSPECIFIED TYPE: ICD-10-CM

## 2017-09-13 DIAGNOSIS — Z95.1 S/P CABG X 3: ICD-10-CM

## 2017-09-13 DIAGNOSIS — I25.10 CORONARY ARTERY DISEASE INVOLVING NATIVE CORONARY ARTERY OF NATIVE HEART WITHOUT ANGINA PECTORIS: ICD-10-CM

## 2017-09-13 DIAGNOSIS — C79.51 PROSTATE CANCER METASTATIC TO BONE (HCC): Primary | ICD-10-CM

## 2017-09-13 DIAGNOSIS — J98.4 RESTRICTIVE LUNG DISEASE: ICD-10-CM

## 2017-09-13 DIAGNOSIS — F33.2 SEVERE RECURRENT MAJOR DEPRESSION WITHOUT PSYCHOTIC FEATURES (HCC): ICD-10-CM

## 2017-09-13 DIAGNOSIS — C61 PROSTATE CANCER METASTATIC TO BONE (HCC): Primary | ICD-10-CM

## 2017-09-13 DIAGNOSIS — I10 ESSENTIAL HYPERTENSION: ICD-10-CM

## 2017-09-13 DIAGNOSIS — Q21.12 PATENT FORAMEN OVALE: ICD-10-CM

## 2017-09-13 PROCEDURE — 3017F COLORECTAL CA SCREEN DOC REV: CPT | Performed by: FAMILY MEDICINE

## 2017-09-13 PROCEDURE — 1123F ACP DISCUSS/DSCN MKR DOCD: CPT | Performed by: FAMILY MEDICINE

## 2017-09-13 PROCEDURE — 4040F PNEUMOC VAC/ADMIN/RCVD: CPT | Performed by: FAMILY MEDICINE

## 2017-09-13 PROCEDURE — 1111F DSCHRG MED/CURRENT MED MERGE: CPT | Performed by: FAMILY MEDICINE

## 2017-09-13 PROCEDURE — G8420 CALC BMI NORM PARAMETERS: HCPCS | Performed by: FAMILY MEDICINE

## 2017-09-13 PROCEDURE — 4004F PT TOBACCO SCREEN RCVD TLK: CPT | Performed by: FAMILY MEDICINE

## 2017-09-13 PROCEDURE — G8427 DOCREV CUR MEDS BY ELIG CLIN: HCPCS | Performed by: FAMILY MEDICINE

## 2017-09-13 PROCEDURE — G8598 ASA/ANTIPLAT THER USED: HCPCS | Performed by: FAMILY MEDICINE

## 2017-09-13 PROCEDURE — 94010 BREATHING CAPACITY TEST: CPT | Performed by: FAMILY MEDICINE

## 2017-09-13 PROCEDURE — 99214 OFFICE O/P EST MOD 30 MIN: CPT | Performed by: FAMILY MEDICINE

## 2017-09-18 ENCOUNTER — OFFICE VISIT (OUTPATIENT)
Dept: CARDIOLOGY CLINIC | Age: 67
End: 2017-09-18

## 2017-09-18 VITALS
DIASTOLIC BLOOD PRESSURE: 78 MMHG | SYSTOLIC BLOOD PRESSURE: 128 MMHG | WEIGHT: 143 LBS | HEIGHT: 69 IN | HEART RATE: 69 BPM | BODY MASS INDEX: 21.18 KG/M2

## 2017-09-18 DIAGNOSIS — R07.89 OTHER CHEST PAIN: ICD-10-CM

## 2017-09-18 DIAGNOSIS — Z95.1 S/P CABG X 3: ICD-10-CM

## 2017-09-18 DIAGNOSIS — E78.2 MIXED HYPERLIPIDEMIA: ICD-10-CM

## 2017-09-18 DIAGNOSIS — R94.31 PROLONGED Q-T INTERVAL ON ECG: ICD-10-CM

## 2017-09-18 DIAGNOSIS — Z01.810 PREOP CARDIOVASCULAR EXAM: Primary | ICD-10-CM

## 2017-09-18 DIAGNOSIS — I10 ESSENTIAL HYPERTENSION: ICD-10-CM

## 2017-09-18 DIAGNOSIS — I25.119 CORONARY ARTERY DISEASE INVOLVING NATIVE CORONARY ARTERY OF NATIVE HEART WITH ANGINA PECTORIS (HCC): ICD-10-CM

## 2017-09-18 PROCEDURE — G8420 CALC BMI NORM PARAMETERS: HCPCS | Performed by: INTERNAL MEDICINE

## 2017-09-18 PROCEDURE — 4040F PNEUMOC VAC/ADMIN/RCVD: CPT | Performed by: INTERNAL MEDICINE

## 2017-09-18 PROCEDURE — G8427 DOCREV CUR MEDS BY ELIG CLIN: HCPCS | Performed by: INTERNAL MEDICINE

## 2017-09-18 PROCEDURE — 93000 ELECTROCARDIOGRAM COMPLETE: CPT | Performed by: INTERNAL MEDICINE

## 2017-09-18 PROCEDURE — 4004F PT TOBACCO SCREEN RCVD TLK: CPT | Performed by: INTERNAL MEDICINE

## 2017-09-18 PROCEDURE — 3017F COLORECTAL CA SCREEN DOC REV: CPT | Performed by: INTERNAL MEDICINE

## 2017-09-18 PROCEDURE — G8598 ASA/ANTIPLAT THER USED: HCPCS | Performed by: INTERNAL MEDICINE

## 2017-09-18 PROCEDURE — 1123F ACP DISCUSS/DSCN MKR DOCD: CPT | Performed by: INTERNAL MEDICINE

## 2017-09-18 PROCEDURE — 99215 OFFICE O/P EST HI 40 MIN: CPT | Performed by: INTERNAL MEDICINE

## 2017-09-18 PROCEDURE — 1111F DSCHRG MED/CURRENT MED MERGE: CPT | Performed by: INTERNAL MEDICINE

## 2017-09-18 RX ORDER — PREDNISONE 1 MG/1
5 TABLET ORAL 2 TIMES DAILY
COMMUNITY
End: 2018-08-24

## 2017-09-19 ENCOUNTER — HOSPITAL ENCOUNTER (OUTPATIENT)
Dept: NON INVASIVE DIAGNOSTICS | Age: 67
Discharge: HOME | End: 2017-09-19
Attending: INTERNAL MEDICINE | Admitting: INTERNAL MEDICINE

## 2017-09-19 DIAGNOSIS — Z01.810 ENCOUNTER FOR PREPROCEDURAL CARDIOVASCULAR EXAMINATION: ICD-10-CM

## 2017-09-19 LAB
LV EF: 65 %
LVEF MODALITY: NORMAL

## 2017-09-19 RX ORDER — AMINOPHYLLINE DIHYDRATE 25 MG/ML
100 INJECTION, SOLUTION INTRAVENOUS ONCE
Status: COMPLETED | OUTPATIENT
Start: 2017-09-19 | End: 2017-09-19

## 2017-09-19 RX ADMIN — AMINOPHYLLINE DIHYDRATE 100 MG: 25 INJECTION, SOLUTION INTRAVENOUS at 14:43

## 2017-09-21 PROBLEM — C79.51 METASTASIS TO BONE (HCC): Status: ACTIVE | Noted: 2017-09-21

## 2017-09-22 RX ORDER — VENLAFAXINE HYDROCHLORIDE 150 MG/1
CAPSULE, EXTENDED RELEASE ORAL
Qty: 60 CAPSULE | Refills: 1 | Status: SHIPPED | OUTPATIENT
Start: 2017-09-22 | End: 2017-09-25 | Stop reason: SDUPTHER

## 2017-09-23 ENCOUNTER — CARE COORDINATION (OUTPATIENT)
Dept: CASE MANAGEMENT | Age: 67
End: 2017-09-23

## 2017-09-25 ENCOUNTER — TELEPHONE (OUTPATIENT)
Dept: ORTHOPEDIC SURGERY | Age: 67
End: 2017-09-25

## 2017-09-25 ENCOUNTER — TELEPHONE (OUTPATIENT)
Dept: PHARMACY | Facility: CLINIC | Age: 67
End: 2017-09-25

## 2017-09-25 RX ORDER — VENLAFAXINE HYDROCHLORIDE 150 MG/1
CAPSULE, EXTENDED RELEASE ORAL
Qty: 60 CAPSULE | Refills: 0 | Status: SHIPPED | OUTPATIENT
Start: 2017-09-25 | End: 2017-10-27 | Stop reason: SDUPTHER

## 2017-09-27 ENCOUNTER — CARE COORDINATION (OUTPATIENT)
Dept: CASE MANAGEMENT | Age: 67
End: 2017-09-27

## 2017-09-29 ENCOUNTER — TELEPHONE (OUTPATIENT)
Dept: ORTHOPEDIC SURGERY | Age: 67
End: 2017-09-29

## 2017-10-02 ENCOUNTER — TELEPHONE (OUTPATIENT)
Dept: ORTHOPEDIC SURGERY | Age: 67
End: 2017-10-02

## 2017-10-02 NOTE — TELEPHONE ENCOUNTER
Patient had sx on left femur , keanu was  inserted on 9/21/17  He states for the last 4-5 days he has had increasing pain - he says he does not seem like his  healing is on the normal path- states he is concerned- could there be something wrong- could he have infection or something    He is asking for a call back today ASAP to discuss  -pls call him at [phone]

## 2017-10-03 NOTE — TELEPHONE ENCOUNTER
Spoke to patient, he states he is having increasing pain in his leg from his hip to his calf. Scheduled appt for tomorrow.

## 2017-10-04 ENCOUNTER — OFFICE VISIT (OUTPATIENT)
Dept: ORTHOPEDIC SURGERY | Age: 67
End: 2017-10-04

## 2017-10-04 VITALS — BODY MASS INDEX: 21.18 KG/M2 | HEIGHT: 69 IN | WEIGHT: 143 LBS

## 2017-10-04 DIAGNOSIS — M89.8X5 PAIN OF LEFT FEMUR: Primary | ICD-10-CM

## 2017-10-04 DIAGNOSIS — C79.51 BONE METASTASIS (HCC): ICD-10-CM

## 2017-10-04 DIAGNOSIS — C79.51 METASTASIS TO BONE (HCC): ICD-10-CM

## 2017-10-04 PROCEDURE — 73552 X-RAY EXAM OF FEMUR 2/>: CPT | Performed by: ORTHOPAEDIC SURGERY

## 2017-10-04 PROCEDURE — 99024 POSTOP FOLLOW-UP VISIT: CPT | Performed by: ORTHOPAEDIC SURGERY

## 2017-10-04 RX ORDER — DRONABINOL 2.5 MG/1
CAPSULE ORAL PRN
COMMUNITY
Start: 2017-07-03 | End: 2019-01-01 | Stop reason: ALTCHOICE

## 2017-10-04 RX ORDER — FUROSEMIDE 20 MG/1
TABLET ORAL
COMMUNITY
Start: 2017-08-21 | End: 2017-11-13 | Stop reason: ALTCHOICE

## 2017-10-04 RX ORDER — ATORVASTATIN CALCIUM 20 MG/1
TABLET, FILM COATED ORAL
COMMUNITY
Start: 2017-08-17 | End: 2019-01-01 | Stop reason: SDUPTHER

## 2017-10-04 NOTE — LETTER
[address] [facility]  [address]  [address] [facility] [address]  Phone: [phone]  Fax: [phone]    Roe Stubbs*        October 4, 2017       Patient: Santino Campa   MR Number: [id number]   YOB: 1950   Date of Visit: 10/4/2017       Dear Dr. Paulina Davidson: Thank you for the request for consultation for Leim Cogan to me for the evaluation of left subtrochanteric femur bone metastasis. Below are the relevant portions of my assessment and plan of care. If you have questions, please do not hesitate to call me. I look forward to following Etienne Hale along with you.     Sincerely,        Becky Villar MD    CC providers:  Bonifacio Valiente, [address] [address] In Basket

## 2017-10-04 NOTE — PROGRESS NOTES
Callie Mendez  [id number]  Encounter Date: 10/4/2017  YOB: 1950    Chief Complaint   Patient presents with    Post-Op Check     IM nail, left femur; DOS 9/21/17. History:Mr. Callie Mendez is here in follow up regarding his left femur IM nail for subtrochanteric bony metastases secondary to prostate cancer. He reports he was doing well initially after surgery but has had some increased pain over the last few days. He has stopped physical therapy as recommended. He is on extended release morphine and using Percocet 5 mg tablets for breakthrough pain every 4 hours. He is accompanied by his wife who states that he seems to be requiring the pain medicine before and is due and is requesting consideration for increasing this. He also has pain radiating down from his back which is related to his spine metastases. He's had no falls since he went home. No drainage from his wounds. No fevers or chills. Exam:  Ht 5' 9\" (1.753 m)  Wt 143 lb (64.9 kg)  BMI 21.12 kg/m2  General: Alert and oriented x3, No acute distress, Cooperative and conversant  left hip: Incision areas are well-healed with no erythema or drainage. Soft tissue swelling as expected for her recent intramedullary nail. He tolerates logroll the hip and can hold the knee flexed to 90° in the seated position. He can perform a straight leg raise in the seated position. There is still some pain along the anterior aspect of his thigh and lateral portion of his hip with motion. This extends up towards his back. Gross motor function and light touch sensation are intact although he does have diminished strength throughout his left lower extremity. Calf is soft with negative Homans sign. No signs / symptoms of DVT / PE or infection    X-rays:  2 views left femur show stable alignment of his intramedullary keanu with no evidence of hardware complications. No new lytic areas or evidence for fracture within the femur. Assessment:  1.  Pain of left femur  XR FEMUR LEFT (MIN 2 VIEWS)   2. Bone metastasis ([address]     3. 9/21/17 LEFT femur IM nail 2/2 metastatic prostate cancer         Orders  Orders Placed This Encounter   Procedures    XR FEMUR LEFT (MIN 2 VIEWS)       Plan:  He is stable status post prophylactic nail. I believe most of his pain is related to his bony metastases and this will continue to be a problem for him long-term. This is related to his cancer diagnosis and will need to continue working through his oncologist for his pain medication. Fortunately, cancer diagnoses do fall outside of state guidelines for chronic narcotic medication and we do have some leeway in treating him. I would still like to have him continue getting his narcotics through one source. He may do better with an increased to Percocet 7.5 mg tablets which would give him about 50% more short acting narcotic. They will contact Dr. Tyrone Carballo regarding options. I will see him back in 3 weeks to check his progress with repeat x-rays    He understands and accepts this course of care.

## 2017-10-04 NOTE — MR AVS SNAPSHOT
After Visit Summary             Radha Tinoco   10/4/2017 9:30 AM   Office Visit    Description:  Male : 1950   Provider:  Familia Clarke MD   Department:  [address] [place] [address] and Future Appointments         Below is a list of your follow-up and future appointments. This may not be a complete list as you may have made appointments directly with providers that we are not aware of or your providers may have made some for you. Please call your providers to confirm appointments. It is important to keep your appointments. Please bring your current insurance card, photo ID, co-pay, and all medication bottles to your appointment. If self-pay, payment is expected at the time of service. Your To-Do List     Future Appointments Provider Department Dept Phone    10/30/2017 10:45 AM Familia Clarke, [address] [phone]    2017 1:45 PM [address] [place] [address], [address] [place] [address] [phone]    Please arrive 15 minutes prior to appointment, bring photo ID and insurance card. Follow-Up    Return in about 3 weeks (around 10/25/2017). Information from Your Visit        Department     Name Address Phone Fax    [address]  [facility] [phone]      You Were Seen for:         Comments    Pain of left femur   [[id number]]         Vital Signs     Height Weight Body Mass Index Smoking Status          5' 9\" (1.753 m) 143 lb (64.9 kg) 21.12 kg/m2 Never Smoker           Today's Medication Changes          These changes are accurate as of: 10/4/17 10:44 AM.  If you have any questions, ask your nurse or doctor. CHANGE how you take these medications           * atorvastatin 40 MG tablet   Commonly known as:  LIPITOR   Instructions:   Take 1 tablet by mouth daily   Quantity:  90 tablet   Refills:  0   What changed:  how much to take Changed by: Prabhu Mayes MD       * atorvastatin 20 MG tablet   Commonly known as:  LIPITOR   Refills:  0   What changed:  Another medication with the same name was changed. Make sure you understand how and when to take each. Changed by:  Haley Haque MD       * Notice: This list has 2 medication(s) that are the same as other medications prescribed for you. Read the directions carefully, and ask your doctor or other care provider to review them with you. Your Current Medications Are              atorvastatin (LIPITOR) 20 MG tablet     dronabinol (MARINOL) 2.5 MG capsule     furosemide (LASIX) 20 MG tablet     venlafaxine (EFFEXOR XR) 150 MG extended release capsule TAKE 2 CAPSULES BY MOUTH DAILY    apixaban (ELIQUIS) 2.5 MG TABS tablet Take 1 tablet by mouth 2 times daily    predniSONE (DELTASONE) 5 MG tablet Take 5 mg by mouth 2 times daily    atorvastatin (LIPITOR) 40 MG tablet Take 1 tablet by mouth daily    morphine (MS CONTIN) 30 MG extended release tablet Take 1 tablet by mouth 2 times daily . oxyCODONE-acetaminophen (PERCOCET) 5-325 MG per tablet Take 1 tablet by mouth every 4 hours as needed for Pain .     abiraterone acetate (ZYTIGA) 250 MG tablet Take 4 tablets by mouth daily    metoprolol tartrate (LOPRESSOR) 25 MG tablet TAKE ONE TABLET BY MOUTH TWICE A DAY    hydrOXYzine (ATARAX) 50 MG tablet Take 1 tablet by mouth every 6 hours as needed for Itching or Anxiety (Has skin picking and anxiety)    ARIPiprazole (ABILIFY) 10 MG tablet Take 1 tablet by mouth daily    aspirin 81 MG tablet Take 1 tablet by mouth daily    cholestyramine (QUESTRAN) 4 G packet Take 1 packet by mouth 2 times daily as needed (diarrhea)    potassium chloride (KLOR-CON) 10 MEQ extended release tablet Take 2 tablets by mouth daily    diphenoxylate-atropine (LOMOTIL) 2.5-0.025 MG per tablet Take 1 tablet by mouth 4 times daily as needed for Diarrhea tamsulosin (FLOMAX) 0.4 MG capsule Take 1 capsule by mouth daily    prochlorperazine (COMPAZINE) 5 MG tablet Take 1 tablet by mouth every 6 hours as needed for Nausea    cyclobenzaprine (FLEXERIL) 10 MG tablet TAKE ONE TABLET BY MOUTH THREE TIMES A DAY AS NEEDED FOR MUSCLE SPASMS    traZODone (DESYREL) 100 MG tablet TAKE ONE TABLET BY MOUTH ONCE NIGHTLY      Allergies              Dicyclomine Other (See Comments)    Muscle spasms    Dye [Iodides] Swelling    ivp dye     Lactose Intolerance (Gi)     Pcn [Penicillins] Swelling    itch    Plavix [Clopidogrel] Other (See Comments)    Flu like sx      We Ordered/Performed the following           XR FEMUR LEFT (MIN 2 VIEWS)     Comments:    2V Lt Femur         Result Summary for XR FEMUR LEFT (MIN 2 VIEWS)      Result Information     Status          Final result (Exam End: 10/4/2017 10:03 AM)           10/4/2017 10:03 AM      Narrative & Impression           Radiology result is complete; follow up with provider / physician office for radiology results                       Additional Information        Basic Information     Date Of Birth Sex Race Ethnicity Preferred Language    1950 Male White Non-/Non  English      Problem List as of 10/4/2017  Date Reviewed: 10/4/2017                9/21/17 LEFT femur IM nail 2/2 metastatic prostate cancer    Fracture    Preop cardiovascular exam    Bipolar 1 disorder ([address]    Bipolar 1 disorder ([address]    Anxiety    Subjective tinnitus of both ears    Hearing loss of both ears    Severe major depression (HCC)    Irritable bowel syndrome with diarrhea    Diarrhea    Prolonged Q-T interval on ECG    Bilateral leg edema    Weakness    Bone metastasis (HCC)    Left facial numbness    Neoplasm related pain    Anorexia    Drug-induced constipation    Prostate cancer metastatic to bone [facility])    Prostate cancer ([address]    Left hip pain    Left leg weakness    Hydronephrosis of right kidney    Abnormal weight loss Lytic bone lesions on xray    Bone pain    NSTEMI (non-ST elevated myocardial infarction) ([facility]    Unstable angina ([facility]    TIA (transient ischemic attack)    Hypertension    Hyperlipidemia      Immunizations as of 10/4/2017     Name Date    Pneumococcal 13-valent Conjugate (Margarie Sienna) 3/17/2017    Pneumococcal Polysaccharide ([id number]) 6/2/2017    Td 6/9/2009      Preventive Care        Date Due    Hepatitis C screening is recommended for all adults regardless of risk factors born between [facility] at least once (lifetime) who have never been tested. 1950    Tetanus Combination Vaccine (1 - Tdap) 6/10/2009    Zoster Vaccine 7/6/2011    Yearly Flu Vaccine (1) 1/1/2018 (Originally 9/1/2017)    Cholesterol Screening 4/3/2022    Colonoscopy 4/20/2026            MyChart Signup           Our records indicate that you have declined MyChart signup.

## 2017-10-05 ENCOUNTER — CARE COORDINATION (OUTPATIENT)
Dept: CASE MANAGEMENT | Age: 67
End: 2017-10-05

## 2017-10-05 NOTE — CARE COORDINATION
[facility] Transitions Follow Up Call    10/5/2017    Patient: Radha Tinoco  Patient : 1950   MRN: [id number]  Reason for Admission: LT femur IM nail  Discharge Date: 17 RARS: Risk Score: 25    Follow up call attempted, left contact info on vm       Follow Up  Future Appointments  Date Time Provider Vani Arredondo   10/30/2017 10:45 AM MD ANITA [facility]e [facility]   2017 1:45 PM [address] [place] [address] [facility]       Violette oJ RN

## 2017-10-06 NOTE — CARE COORDINATION
[address] Transitions Follow Up Call    10/6/2017    Patient: Lali Lrary  Patient : 1950   MRN: [id number]  Reason for Admission: lt. Femur IM nail  Discharge Date: 17 RARS: Risk Score: 25       Spoke with: [address] Transitions Subsequent and Final Call    Subsequent and Final Calls   Do you have any ongoing symptoms?:  Yes   Onset of Patient-reported symptoms: In the past 7 days   Patient-reported symptoms:  Pain   Interventions for patient-reported symptoms:  Other   Have your medications changed?:  Yes   Patient Reports:  increase in pain med, percocet   Do you have any questions related to your medications?:  No   Do you currently have any active services?:  Yes   Are you currently active with any services?:  Home Health   Do you have any needs or concerns that I can assist you with?:  No   Identified Barriers:  None   Care Transitions Interventions   No Identified Needs   Other Interventions:                            Pt states he continues to have pain in hip, saw MD, increased pain med; PT coming 3x/week.  No need for further CTC f/u calls    Follow Up  Future Appointments  Date Time Provider Vani Arredondo   10/30/2017 10:45 AM An Delgado MD [facility]   2017 1:45 PM Rosales Robb CNP [facility]       Amando Islas RN

## 2017-10-09 ENCOUNTER — TELEPHONE (OUTPATIENT)
Dept: FAMILY MEDICINE CLINIC | Age: 67
End: 2017-10-09

## 2017-10-09 DIAGNOSIS — C79.51 BONE METASTASIS (HCC): Primary | ICD-10-CM

## 2017-10-09 NOTE — TELEPHONE ENCOUNTER
They are seeing him for Physical Therapy. He received some estate papers and wants to see if they can get an order for ,  She can help him with those.

## 2017-10-11 ENCOUNTER — TELEPHONE (OUTPATIENT)
Dept: FAMILY MEDICINE CLINIC | Age: 67
End: 2017-10-11

## 2017-10-11 DIAGNOSIS — R94.2 ABNORMAL LUNG FUNCTION TEST: Primary | ICD-10-CM

## 2017-10-11 NOTE — TELEPHONE ENCOUNTER
CALLED AND SPOKE TO WIFE PER SHANA GAVE DR. JOEL NUMBER AND ADVISED HER TO CALL HIS OFFICE TO SCHEDULE.  SC

## 2017-10-11 NOTE — TELEPHONE ENCOUNTER
Pt was seen three weeks ago for a pre op.     Do you want to see pt again for his intake of oxygen  Or do you want to refer him to a pulmonologist?  It is two weeks past his surgery

## 2017-10-22 RX ORDER — APIXABAN 2.5 MG/1
2.5 TABLET, FILM COATED ORAL 2 TIMES DAILY
Qty: 60 TABLET | Refills: 0 | OUTPATIENT
Start: 2017-10-22 | End: 2017-11-21

## 2017-10-23 NOTE — TELEPHONE ENCOUNTER
Per Cristopher Patel : Duplicate Request (Patient has completed 30 days of Eliquis for orthopaedic prophylaxis)

## 2017-10-27 DIAGNOSIS — F32.2 SEVERE MAJOR DEPRESSION (HCC): ICD-10-CM

## 2017-10-27 RX ORDER — ARIPIPRAZOLE 10 MG/1
10 TABLET ORAL DAILY
Qty: 90 TABLET | Refills: 0 | Status: SHIPPED | OUTPATIENT
Start: 2017-10-27 | End: 2018-01-30 | Stop reason: SDUPTHER

## 2017-10-27 RX ORDER — VENLAFAXINE HYDROCHLORIDE 150 MG/1
CAPSULE, EXTENDED RELEASE ORAL
Qty: 180 CAPSULE | Refills: 0 | Status: SHIPPED | OUTPATIENT
Start: 2017-10-27 | End: 2018-02-15 | Stop reason: SDUPTHER

## 2017-10-30 ENCOUNTER — TELEPHONE (OUTPATIENT)
Dept: ORTHOPEDIC SURGERY | Age: 67
End: 2017-10-30

## 2017-11-01 ENCOUNTER — OFFICE VISIT (OUTPATIENT)
Dept: PULMONOLOGY | Age: 67
End: 2017-11-01

## 2017-11-01 ENCOUNTER — OFFICE VISIT (OUTPATIENT)
Dept: ORTHOPEDIC SURGERY | Age: 67
End: 2017-11-01

## 2017-11-01 VITALS
DIASTOLIC BLOOD PRESSURE: 94 MMHG | HEART RATE: 59 BPM | HEIGHT: 70 IN | BODY MASS INDEX: 21.05 KG/M2 | RESPIRATION RATE: 18 BRPM | OXYGEN SATURATION: 98 % | WEIGHT: 147 LBS | SYSTOLIC BLOOD PRESSURE: 172 MMHG

## 2017-11-01 VITALS
SYSTOLIC BLOOD PRESSURE: 124 MMHG | HEART RATE: 70 BPM | BODY MASS INDEX: 21.19 KG/M2 | HEIGHT: 70 IN | DIASTOLIC BLOOD PRESSURE: 73 MMHG | WEIGHT: 148 LBS

## 2017-11-01 DIAGNOSIS — R06.02 SOB (SHORTNESS OF BREATH): Primary | ICD-10-CM

## 2017-11-01 DIAGNOSIS — C79.51 METASTASIS TO BONE (HCC): Primary | ICD-10-CM

## 2017-11-01 DIAGNOSIS — K44.9 HIATAL HERNIA: ICD-10-CM

## 2017-11-01 DIAGNOSIS — R06.2 WHEEZING: ICD-10-CM

## 2017-11-01 DIAGNOSIS — R91.8 PULMONARY NODULES: ICD-10-CM

## 2017-11-01 PROCEDURE — G8427 DOCREV CUR MEDS BY ELIG CLIN: HCPCS | Performed by: INTERNAL MEDICINE

## 2017-11-01 PROCEDURE — 99024 POSTOP FOLLOW-UP VISIT: CPT | Performed by: ORTHOPAEDIC SURGERY

## 2017-11-01 PROCEDURE — 1123F ACP DISCUSS/DSCN MKR DOCD: CPT | Performed by: INTERNAL MEDICINE

## 2017-11-01 PROCEDURE — 73552 X-RAY EXAM OF FEMUR 2/>: CPT | Performed by: ORTHOPAEDIC SURGERY

## 2017-11-01 PROCEDURE — G8484 FLU IMMUNIZE NO ADMIN: HCPCS | Performed by: INTERNAL MEDICINE

## 2017-11-01 PROCEDURE — 99204 OFFICE O/P NEW MOD 45 MIN: CPT | Performed by: INTERNAL MEDICINE

## 2017-11-01 PROCEDURE — 3017F COLORECTAL CA SCREEN DOC REV: CPT | Performed by: INTERNAL MEDICINE

## 2017-11-01 PROCEDURE — G8598 ASA/ANTIPLAT THER USED: HCPCS | Performed by: INTERNAL MEDICINE

## 2017-11-01 PROCEDURE — 4040F PNEUMOC VAC/ADMIN/RCVD: CPT | Performed by: INTERNAL MEDICINE

## 2017-11-01 PROCEDURE — 4004F PT TOBACCO SCREEN RCVD TLK: CPT | Performed by: INTERNAL MEDICINE

## 2017-11-01 PROCEDURE — G8420 CALC BMI NORM PARAMETERS: HCPCS | Performed by: INTERNAL MEDICINE

## 2017-11-01 NOTE — LETTER
Pulmonary, Critical Care & Sleep  [address] [facility], [address] [facility] [address]  Phone: [phone]  Fax: [phone]      November 1, 2017       Patient: Ibis Gambino   MR Number: [id number]   YOB: 1950   Date of Visit: 11/1/2017       Dear Dr. Whitlock Men: Thank you for the request for consultation for Sharif Escalera to me for the evaluation of SOB. Below are the relevant portions of my assessment and plan of care. Assessment:    1. SOB (shortness of breath)     2. Wheezing     3. Pulmonary nodules     4. Hiatal hernia             Plan:    1. I discussed with patient and his wife the above  2. He might have asthma with COPD. I will order full PFT  3. I explained the hiatal hernia related respiratory complications and gave him instructions to follow  4. Will repeat CT chest as well  5. RTC in 4 weeks      If you have questions, please do not hesitate to call me. I look forward to following Christophe Perdomo along with you.       Sincerely,        Antoine Lowe MD    CC providers:  Sherron Huynh [address]  [place]

## 2017-11-01 NOTE — PROGRESS NOTES
Janna Rooney is 78 y.o. male here for Pulmonary Medicine consultation referred by Dr. Sarmad Stevens for evaluation of had concerns including Shortness of Breath (NPV - pt is having increased sob / wheezing). 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   He was diagnosed with metastatic prostate ca 3-2017  CT chest 3-2017 indicated some small nodules and small pulmonary nodules  He did have spirometry 2016 that suggested obstructive changes    Past Medical History:   Diagnosis Date    ADHD (attention deficit hyperactivity disorder)     [facility] 1 [facility] ([facility]     CAD (coronary artery disease) 2016    Cancer ([facility]     BONE    Cerebrovascular disease 2016    5 TIAs in 3 weeks - R side weakness, numbness R face    Depression     Hard of hearing     left ear     Hyperlipidemia     Hypertension     Lactose intolerance     OCD (obsessive compulsive disorder)     PFO (patent foramen ovale) 2016    Prolonged Q-T interval on ECG 06/2017    Prostate cancer ([facility]     PUD (peptic ulcer disease) 6/1980    GI bleed (\"lost half blood volume\"). no surgery.  Suicide attempt July, 2014    [facility] hospitalization    Wound infection July 17, 2014    L thigh.  [facility] hospitalization     Current Outpatient Prescriptions   Medication Sig Dispense Refill    venlafaxine (EFFEXOR XR) 150 MG extended release capsule TAKE 2 CAPSULES BY MOUTH DAILY 180 capsule 0    ARIPiprazole (ABILIFY) 10 MG tablet Take 1 tablet by mouth daily 90 tablet 0    atorvastatin (LIPITOR) 20 MG tablet       dronabinol (MARINOL) 2.5 MG capsule       furosemide (LASIX) 20 MG tablet       predniSONE (DELTASONE) 5 MG tablet Take 5 mg by mouth 2 times daily      atorvastatin (LIPITOR) 40 MG tablet Take 1 tablet by mouth daily (Patient taking differently: Take 20 mg by mouth daily ) 90 tablet 0    morphine (MS CONTIN) 30 MG extended release tablet Take 1 tablet by mouth 2 times daily . 60 tablet 0    oxyCODONE-acetaminophen (PERCOCET) 5-325 MG per tablet Take 1 tablet by mouth every 4 hours as needed for Pain . 90 tablet 0    abiraterone acetate (ZYTIGA) 250 MG tablet Take 4 tablets by mouth daily 120 tablet 3    metoprolol tartrate (LOPRESSOR) 25 MG tablet TAKE ONE TABLET BY MOUTH TWICE A DAY 60 tablet 5    hydrOXYzine (ATARAX) 50 MG tablet Take 1 tablet by mouth every 6 hours as needed for Itching or Anxiety (Has skin picking and anxiety) 60 tablet 5    aspirin 81 MG tablet Take 1 tablet by mouth daily 30 tablet 3    cholestyramine (QUESTRAN) 4 G packet Take 1 packet by mouth 2 times daily as needed (diarrhea) 90 packet 3    potassium chloride (KLOR-CON) 10 MEQ extended release tablet Take 2 tablets by mouth daily 60 tablet 3    diphenoxylate-atropine (LOMOTIL) 2.5-0.025 MG per tablet Take 1 tablet by mouth 4 times daily as needed for Diarrhea 40 tablet 5    tamsulosin (FLOMAX) 0.4 MG capsule Take 1 capsule by mouth daily 30 capsule 3    prochlorperazine (COMPAZINE) 5 MG tablet Take 1 tablet by mouth every 6 hours as needed for Nausea 120 tablet 3    cyclobenzaprine (FLEXERIL) 10 MG tablet TAKE ONE TABLET BY MOUTH THREE TIMES A DAY AS NEEDED FOR MUSCLE SPASMS 30 tablet 5    traZODone (DESYREL) 100 MG tablet TAKE ONE TABLET BY MOUTH ONCE NIGHTLY (Patient taking differently: nightly as needed TAKE ONE TABLET BY MOUTH ONCE NIGHTLY) 30 tablet 3     No current facility-administered medications for this visit. Family History   Problem Relation Age of Onset    Hypertension Father 68     , Parkinson's, HTN.  Parkinsonism Father    Jj Kevin Other Mother 80     Alive - hip replacement. Social History     Social History    Marital status:      Spouse name: N/A    Number of children: 2    Years of education: N/A     Occupational History    Not on file.      Social History Main Topics    Smoking status: Never Smoker    Smokeless tobacco: Current User     Types: Oropharynx is clear and moist. No oropharyngeal exudate. Eyes: Conjunctivae and extraocular motions are normal. Pupils are equal, round, and reactive to light. Right eye exhibits no discharge. Left eye exhibits no discharge. Neck: Normal range of motion. Neck supple. No JVD present. Carotid bruit is not present. No rigidity. No tracheal deviation present. No thyromegaly present. Cardiovascular: regular rhythm, S1&S2 and intact distal pulses. Pulmonary/Chest: Effort normal and breath sounds normal. No stridor. No respiratory distress. Has no wheezes. Has no rhonchi. Has no rales. Exhibits no tenderness. Abdominal: Soft. Bowel sounds are normal. Exhibits no shifting dullness, no distension and no mass. No tenderness. Has no rebound and no guarding. Musculoskeletal: Normal range of motion. Exhibits no edema and no tenderness. Lymphadenopathy:     Has no cervical adenopathy. Has no axillary adenopathy. Neurological: Alert and oriented. Has normal reflexes. No cranial nerve deficit. Exhibits normal muscle tone. Coordination normal.   Skin: Skin is warm and dry. No rash noted. No erythema. Psychiatric: Has a normal mood and affect. Behavior is normal. Thought content normal.      Assessment:    1. SOB (shortness of breath)     2. Wheezing     3. Pulmonary nodules     4. Hiatal hernia               Plan:    1. I discussed with patient and his wife the above  2. He might have asthma with COPD. I will order full PFT  3. I explained the hiatal hernia related respiratory complications and gave him instructions to follow  4. Will repeat CT chest as well  5.  RTC in 4 weeks

## 2017-11-06 ENCOUNTER — HOSPITAL ENCOUNTER (OUTPATIENT)
Dept: PULMONOLOGY | Age: 67
Discharge: HOME | End: 2017-11-06
Attending: FAMILY MEDICINE | Admitting: INTERNAL MEDICINE

## 2017-11-06 VITALS — OXYGEN SATURATION: 95 % | HEART RATE: 52 BPM

## 2017-11-06 DIAGNOSIS — R91.8 PULMONARY NODULES: ICD-10-CM

## 2017-11-06 DIAGNOSIS — R91.8 OTHER NONSPECIFIC ABNORMAL FINDING OF LUNG FIELD (CODE): ICD-10-CM

## 2017-11-06 RX ORDER — SODIUM CHLORIDE FOR INHALATION 0.9 %
3 VIAL, NEBULIZER (ML) INHALATION ONCE
Status: CANCELLED | OUTPATIENT
Start: 2017-11-06

## 2017-11-06 RX ORDER — ALBUTEROL SULFATE 90 UG/1
4 AEROSOL, METERED RESPIRATORY (INHALATION) ONCE
Status: COMPLETED | OUTPATIENT
Start: 2017-11-06 | End: 2017-11-06

## 2017-11-06 RX ORDER — ALBUTEROL SULFATE 2.5 MG/3ML
2.5 SOLUTION RESPIRATORY (INHALATION) ONCE
Status: CANCELLED | OUTPATIENT
Start: 2017-11-06

## 2017-11-06 RX ADMIN — ALBUTEROL SULFATE 4 PUFF: 90 AEROSOL, METERED RESPIRATORY (INHALATION) at 11:50

## 2017-11-06 NOTE — PROGRESS NOTES
Pt received verbal demonstration and instructions on how to perform Pulmonary Function Maneuvers by the Respiratory Therapist. The FEV1 was below 60% therefore per protocol the Methacholine Challenge part of the PFT was not performed. The patient's Pre FEV1 was 52%. Post FEV1 performed after bronchodilator given. Pt tolerated well. SpO2 95%, HR 52.

## 2017-11-13 ENCOUNTER — OFFICE VISIT (OUTPATIENT)
Dept: FAMILY MEDICINE CLINIC | Age: 67
End: 2017-11-13

## 2017-11-13 ENCOUNTER — TELEPHONE (OUTPATIENT)
Dept: PULMONOLOGY | Age: 67
End: 2017-11-13

## 2017-11-13 VITALS
SYSTOLIC BLOOD PRESSURE: 124 MMHG | WEIGHT: 149.2 LBS | HEIGHT: 70 IN | DIASTOLIC BLOOD PRESSURE: 72 MMHG | RESPIRATION RATE: 14 BRPM | BODY MASS INDEX: 21.36 KG/M2 | HEART RATE: 80 BPM

## 2017-11-13 DIAGNOSIS — F41.9 ANXIETY: ICD-10-CM

## 2017-11-13 DIAGNOSIS — C61 PROSTATE CANCER (HCC): ICD-10-CM

## 2017-11-13 DIAGNOSIS — F32.2 SEVERE MAJOR DEPRESSION (HCC): ICD-10-CM

## 2017-11-13 DIAGNOSIS — C79.51 BONE METASTASIS (HCC): ICD-10-CM

## 2017-11-13 DIAGNOSIS — F31.9 BIPOLAR 1 DISORDER (HCC): Primary | ICD-10-CM

## 2017-11-13 PROBLEM — Z01.810 PREOP CARDIOVASCULAR EXAM: Status: RESOLVED | Noted: 2017-09-18 | Resolved: 2017-11-13

## 2017-11-13 PROCEDURE — G8598 ASA/ANTIPLAT THER USED: HCPCS | Performed by: NURSE PRACTITIONER

## 2017-11-13 PROCEDURE — G8427 DOCREV CUR MEDS BY ELIG CLIN: HCPCS | Performed by: NURSE PRACTITIONER

## 2017-11-13 PROCEDURE — 4004F PT TOBACCO SCREEN RCVD TLK: CPT | Performed by: NURSE PRACTITIONER

## 2017-11-13 PROCEDURE — 1123F ACP DISCUSS/DSCN MKR DOCD: CPT | Performed by: NURSE PRACTITIONER

## 2017-11-13 PROCEDURE — G8420 CALC BMI NORM PARAMETERS: HCPCS | Performed by: NURSE PRACTITIONER

## 2017-11-13 PROCEDURE — 99213 OFFICE O/P EST LOW 20 MIN: CPT | Performed by: NURSE PRACTITIONER

## 2017-11-13 PROCEDURE — 4040F PNEUMOC VAC/ADMIN/RCVD: CPT | Performed by: NURSE PRACTITIONER

## 2017-11-13 PROCEDURE — 3017F COLORECTAL CA SCREEN DOC REV: CPT | Performed by: NURSE PRACTITIONER

## 2017-11-13 PROCEDURE — G8484 FLU IMMUNIZE NO ADMIN: HCPCS | Performed by: NURSE PRACTITIONER

## 2017-11-13 NOTE — PROGRESS NOTES
Jean Carlos Cormier  [id number]  Encounter Date: 11/1/2017  YOB: 1950    Chief Complaint   Patient presents with    Post-Op Check     fu for LT IM nail, dos 9/21/17. feeling a little better        History:Mr. Jean Carlos Cormier is here in follow up regarding his left femur IM nail for metastatic prostate cancer in the subtrochanteric region. He still has a fair amount of cancer pain and limitations to his function. Current pain level is 6/10. Overall he feels he is making steady progress though. No drainage from his wounds. No fevers or chills. He is now 6 weeks postop. Exam:  /73   Pulse 70   Ht 5' 10\" (1.778 m)   Wt 148 lb (67.1 kg)   BMI 21.24 kg/m²   General: Alert and oriented x3, No acute distress, Cooperative and conversant  left lower extremity: Incision areas remain well healed. No significant swelling. He tolerates hip flexion to 90° and has minimal pain with logroll. He can perform active knee extension. Calf is soft with negative Homans sign. Gait: Ambulates with a shortened stride length and use of a wheeled walker to maintain his balance. No signs / symptoms of DVT / PE or infection    X-rays:  2 views left femur show stable alignment of his intramedullary nail. No evidence of any new fracture line in the femur. Assessment:  1. 9/21/17 LEFT femur IM nail 2/2 metastatic prostate cancer  XR FEMUR LEFT (MIN 2 VIEWS)       Orders  Orders Placed This Encounter   Procedures    XR FEMUR LEFT (MIN 2 VIEWS)       Plan:  I again reviewed the fact with him and his wife that he will still have pain related to the cancer and this needs to be managed through his pain management/oncology specialist.  I reminded them that the main purpose of surgery was to allow stability to the femur and hopefully he will if he develops a pathologic related fracture he will not have to go through any further surgery to maintain that stability and allow for healing.   My main concern is that if he develops

## 2017-11-14 NOTE — TELEPHONE ENCOUNTER
Pt wanted to be seen sooner than his 1 mo follow up for his CT Chest / PFT results - pt is having increased sob with exertion and is not on any medication at this time - pt does have a hiatal hernia but would feel better being seen sooner - pt see's Dr Emmy Andrade for his Prostate cancer / mets to bone - Dr Emmy Andrade informed of the CT Chest results

## 2017-11-27 ENCOUNTER — OFFICE VISIT (OUTPATIENT)
Dept: PULMONOLOGY | Age: 67
End: 2017-11-27

## 2017-11-27 VITALS
SYSTOLIC BLOOD PRESSURE: 137 MMHG | OXYGEN SATURATION: 98 % | BODY MASS INDEX: 21.38 KG/M2 | HEART RATE: 70 BPM | WEIGHT: 149 LBS | DIASTOLIC BLOOD PRESSURE: 90 MMHG | RESPIRATION RATE: 14 BRPM

## 2017-11-27 DIAGNOSIS — C61 PROSTATE CANCER METASTATIC TO BONE (HCC): ICD-10-CM

## 2017-11-27 DIAGNOSIS — R91.8 MULTIPLE LUNG NODULES ON CT: ICD-10-CM

## 2017-11-27 DIAGNOSIS — C79.51 PROSTATE CANCER METASTATIC TO BONE (HCC): ICD-10-CM

## 2017-11-27 DIAGNOSIS — Z23 NEED FOR IMMUNIZATION AGAINST INFLUENZA: ICD-10-CM

## 2017-11-27 DIAGNOSIS — J43.9 PULMONARY EMPHYSEMA, UNSPECIFIED EMPHYSEMA TYPE (HCC): Primary | ICD-10-CM

## 2017-11-27 PROCEDURE — 1123F ACP DISCUSS/DSCN MKR DOCD: CPT | Performed by: INTERNAL MEDICINE

## 2017-11-27 PROCEDURE — G8420 CALC BMI NORM PARAMETERS: HCPCS | Performed by: INTERNAL MEDICINE

## 2017-11-27 PROCEDURE — 3017F COLORECTAL CA SCREEN DOC REV: CPT | Performed by: INTERNAL MEDICINE

## 2017-11-27 PROCEDURE — G8598 ASA/ANTIPLAT THER USED: HCPCS | Performed by: INTERNAL MEDICINE

## 2017-11-27 PROCEDURE — G8484 FLU IMMUNIZE NO ADMIN: HCPCS | Performed by: INTERNAL MEDICINE

## 2017-11-27 PROCEDURE — 99214 OFFICE O/P EST MOD 30 MIN: CPT | Performed by: INTERNAL MEDICINE

## 2017-11-27 PROCEDURE — G0008 ADMIN INFLUENZA VIRUS VAC: HCPCS | Performed by: INTERNAL MEDICINE

## 2017-11-27 PROCEDURE — G8427 DOCREV CUR MEDS BY ELIG CLIN: HCPCS | Performed by: INTERNAL MEDICINE

## 2017-11-27 PROCEDURE — 4040F PNEUMOC VAC/ADMIN/RCVD: CPT | Performed by: INTERNAL MEDICINE

## 2017-11-27 PROCEDURE — 4004F PT TOBACCO SCREEN RCVD TLK: CPT | Performed by: INTERNAL MEDICINE

## 2017-11-27 PROCEDURE — 90662 IIV NO PRSV INCREASED AG IM: CPT | Performed by: INTERNAL MEDICINE

## 2017-11-27 PROCEDURE — 3023F SPIROM DOC REV: CPT | Performed by: INTERNAL MEDICINE

## 2017-11-27 PROCEDURE — G8926 SPIRO NO PERF OR DOC: HCPCS | Performed by: INTERNAL MEDICINE

## 2017-11-27 RX ORDER — ALBUTEROL SULFATE 90 UG/1
2 AEROSOL, METERED RESPIRATORY (INHALATION) EVERY 6 HOURS PRN
Qty: 1 INHALER | Refills: 3 | Status: SHIPPED | OUTPATIENT
Start: 2017-11-27 | End: 2019-01-01 | Stop reason: ALTCHOICE

## 2017-11-27 ASSESSMENT — ENCOUNTER SYMPTOMS
SINUS PRESSURE: 0
COUGH: 0
RHINORRHEA: 0
BLOOD IN STOOL: 0
CHEST TIGHTNESS: 1
ABDOMINAL PAIN: 0
SORE THROAT: 0
ANAL BLEEDING: 0
DIARRHEA: 0
SHORTNESS OF BREATH: 1
VOICE CHANGE: 0
CHOKING: 0
ABDOMINAL DISTENTION: 0
CONSTIPATION: 0
STRIDOR: 0
WHEEZING: 1
APNEA: 0

## 2017-11-27 NOTE — PROGRESS NOTES
Kerry Thomas    YOB: 1950     Date of Service:  11/27/2017     Chief Complaint   Patient presents with    Shortness of Breath         HPI patient is seen as follow-up to discuss the results of CT chest and PFT. Wife suggest that she hears him wheezing a lot and that he feels that he has difficulty in getting \"air into the lungs\". Denies any cough or phlegm. No chest pain. Both are very concerned about ongoing symptoms. Allergies   Allergen Reactions    Dicyclomine Other (See Comments)     Muscle spasms    Dye [Iodides] Swelling     ivp dye     Lactose Intolerance (Gi)     Pcn [Penicillins] Swelling     itch    Plavix [Clopidogrel] Other (See Comments)     Flu like sx     Outpatient Prescriptions Marked as Taking for the 11/27/17 encounter (Office Visit) with Lee Hall MD   Medication Sig Dispense Refill    umeclidinium-vilanterol (ANORO ELLIPTA) 62.5-25 MCG/INH AEPB inhaler Inhale 1 puff into the lungs daily 1 each 2    albuterol sulfate HFA (PROAIR HFA) 108 (90 Base) MCG/ACT inhaler Inhale 2 puffs into the lungs every 6 hours as needed for Wheezing 1 Inhaler 3       Immunization History   Administered Date(s) Administered    Pneumococcal 13-valent Conjugate ([id number]) 03/17/2017    Pneumococcal Polysaccharide ([id number]) 06/02/2017    Td 06/09/2009       Past Medical History:   Diagnosis Date    ADHD (attention deficit hyperactivity disorder)     Bipolar 1 disorder ([address]     CAD (coronary artery disease) 2016    Cancer ([address]     BONE    Cerebrovascular disease 2016    5 TIAs in 3 weeks - R side weakness, numbness R face    Depression     Hard of hearing     left ear     Hyperlipidemia     Hypertension     Lactose intolerance     OCD (obsessive compulsive disorder)     PFO (patent foramen ovale) 2016    Prolonged Q-T interval on ECG 06/2017    Prostate cancer ([address]     PUD (peptic ulcer disease) 6/1980    GI bleed (\"lost half blood volume\").  no 11/13/17 124/72   11/01/17 124/73         Physical Exam   Constitutional: He is oriented to person, place, and time. He appears well-developed and well-nourished. No distress. HENT:   Mouth/Throat: Oropharynx is clear and moist. No oropharyngeal exudate. Cardiovascular: Normal rate, regular rhythm, normal heart sounds and intact distal pulses. No murmur heard. Pulmonary/Chest: No respiratory distress. He has no wheezes. He has rales. He exhibits no tenderness. Abdominal: He exhibits no distension and no mass. There is no tenderness. There is no rebound and no guarding. Musculoskeletal: He exhibits no edema. Neurological: He is alert and oriented to person, place, and time. He displays normal reflexes. No cranial nerve deficit. He exhibits normal muscle tone. Coordination normal.   Skin: No rash noted. He is not diaphoretic. No erythema. No pallor. Health Maintenance   Topic Date Due    Hepatitis C screen  1950    DTaP/Tdap/Td vaccine (1 - Tdap) 06/10/2009    Zostavax vaccine  07/06/2011    Flu vaccine (1) 01/01/2018 (Originally 9/1/2017)    Lipid screen  04/03/2022    Colon cancer screen colonoscopy  04/20/2026    Pneumococcal high/highest risk  Completed          Assessment/Plan:    1. Pulmonary emphysema, unspecified emphysema type ([address]  [facility]   2. Prostate cancer metastatic to bone [facility])  CT Chest WO Contrast   3. Multiple lung nodules on CT  CT Chest WO Contrast   4. Need for immunization against influenza  INFLUENZA, HIGH DOSE, 65 YRS +, IM, PF, PREFILL SYR, 0.5ML (FLUZONE HD)      I personally reviewed the CT chest done on 11/6, which was suggestive of improved pulmonary nodules particularly around the major fissure on the right side in comparison to a scan done in March 2017. I doubt that these nodules are malignant, but patient is currently on hormonal therapy for metastatic prostate cancer. Continues follow-up with Dr. Lit Spivey.   I will repeat another CT scan in 3 months to assess for stability of the lung nodules. 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 He has also been previously exposed to chemicals and dust  when he worked with [employer]. Would start patient on [facility] ELLIPTA along with albuterol as needed. Would also enroll him into a pulmonary rehabilitation program.  Patient is requesting a flu shot which has been administered today. Return in about 4 weeks (around 12/25/2017).

## 2018-01-01 ENCOUNTER — HOSPITAL ENCOUNTER (OUTPATIENT)
Age: 68
Discharge: HOME | End: 2018-12-24
Payer: COMMERCIAL

## 2018-01-01 ENCOUNTER — OFFICE VISIT (OUTPATIENT)
Dept: CARDIOLOGY CLINIC | Age: 68
End: 2018-01-01
Payer: COMMERCIAL

## 2018-01-01 ENCOUNTER — TELEPHONE (OUTPATIENT)
Dept: CARDIOLOGY CLINIC | Age: 68
End: 2018-01-01

## 2018-01-01 ENCOUNTER — TELEPHONE (OUTPATIENT)
Dept: PULMONOLOGY | Age: 68
End: 2018-01-01

## 2018-01-01 ENCOUNTER — HOSPITAL ENCOUNTER (OUTPATIENT)
Dept: GENERAL RADIOLOGY | Age: 68
Discharge: HOME | End: 2018-12-24
Payer: COMMERCIAL

## 2018-01-01 ENCOUNTER — OFFICE VISIT (OUTPATIENT)
Dept: PULMONOLOGY | Age: 68
End: 2018-01-01
Payer: COMMERCIAL

## 2018-01-01 ENCOUNTER — TELEPHONE (OUTPATIENT)
Dept: FAMILY MEDICINE CLINIC | Age: 68
End: 2018-01-01

## 2018-01-01 VITALS
DIASTOLIC BLOOD PRESSURE: 80 MMHG | WEIGHT: 152 LBS | RESPIRATION RATE: 18 BRPM | HEART RATE: 58 BPM | SYSTOLIC BLOOD PRESSURE: 129 MMHG | OXYGEN SATURATION: 99 % | BODY MASS INDEX: 22.45 KG/M2

## 2018-01-01 VITALS — SYSTOLIC BLOOD PRESSURE: 116 MMHG | DIASTOLIC BLOOD PRESSURE: 76 MMHG | HEART RATE: 68 BPM

## 2018-01-01 DIAGNOSIS — E78.2 MIXED HYPERLIPIDEMIA: ICD-10-CM

## 2018-01-01 DIAGNOSIS — J18.0 ACUTE BRONCHOPNEUMONIA: ICD-10-CM

## 2018-01-01 DIAGNOSIS — Z23 NEED FOR INFLUENZA VACCINATION: Primary | ICD-10-CM

## 2018-01-01 DIAGNOSIS — I10 ESSENTIAL HYPERTENSION: Primary | ICD-10-CM

## 2018-01-01 DIAGNOSIS — I25.119 CORONARY ARTERY DISEASE INVOLVING NATIVE CORONARY ARTERY OF NATIVE HEART WITH ANGINA PECTORIS (HCC): ICD-10-CM

## 2018-01-01 DIAGNOSIS — J43.9 PULMONARY EMPHYSEMA, UNSPECIFIED EMPHYSEMA TYPE (HCC): ICD-10-CM

## 2018-01-01 PROCEDURE — G0008 ADMIN INFLUENZA VIRUS VAC: HCPCS | Performed by: INTERNAL MEDICINE

## 2018-01-01 PROCEDURE — 99214 OFFICE O/P EST MOD 30 MIN: CPT | Performed by: NURSE PRACTITIONER

## 2018-01-01 PROCEDURE — 93000 ELECTROCARDIOGRAM COMPLETE: CPT | Performed by: NURSE PRACTITIONER

## 2018-01-01 PROCEDURE — 99213 OFFICE O/P EST LOW 20 MIN: CPT | Performed by: INTERNAL MEDICINE

## 2018-01-01 PROCEDURE — 71046 X-RAY EXAM CHEST 2 VIEWS: CPT

## 2018-01-01 PROCEDURE — 90662 IIV NO PRSV INCREASED AG IM: CPT | Performed by: INTERNAL MEDICINE

## 2018-01-01 RX ORDER — WEIGH SCALE
1 MISCELLANEOUS MISCELLANEOUS DAILY
Qty: 1 EACH | Refills: 0 | Status: SHIPPED | OUTPATIENT
Start: 2018-01-01

## 2018-01-01 RX ORDER — DOXYCYCLINE HYCLATE 100 MG
100 TABLET ORAL 2 TIMES DAILY
Qty: 14 TABLET | Refills: 0 | Status: SHIPPED | OUTPATIENT
Start: 2018-01-01 | End: 2019-01-01

## 2018-01-01 ASSESSMENT — ENCOUNTER SYMPTOMS
CONSTIPATION: 0
APNEA: 0
SINUS PRESSURE: 0
BLOOD IN STOOL: 0
ABDOMINAL DISTENTION: 0
COUGH: 0
WHEEZING: 0
STRIDOR: 0
BACK PAIN: 0
CHOKING: 0
VOICE CHANGE: 0
CHEST TIGHTNESS: 0
ABDOMINAL PAIN: 0
DIARRHEA: 0
RHINORRHEA: 0
SHORTNESS OF BREATH: 1
SORE THROAT: 0
ANAL BLEEDING: 0

## 2018-01-19 ENCOUNTER — TELEPHONE (OUTPATIENT)
Dept: ORTHOPEDIC SURGERY | Age: 68
End: 2018-01-19

## 2018-01-19 NOTE — TELEPHONE ENCOUNTER
Pt calling about his bone pain he said that it is getting worse. He would like to talk to Dr Aryan Valenzuela about that and the steel keanu in his leg.   Please advise

## 2018-01-30 DIAGNOSIS — F32.2 SEVERE MAJOR DEPRESSION (HCC): ICD-10-CM

## 2018-01-31 RX ORDER — ARIPIPRAZOLE 10 MG/1
10 TABLET ORAL DAILY
Qty: 90 TABLET | Refills: 0 | Status: SHIPPED | OUTPATIENT
Start: 2018-01-31 | End: 2018-05-08 | Stop reason: SDUPTHER

## 2018-02-05 ENCOUNTER — HOSPITAL ENCOUNTER (OUTPATIENT)
Dept: CT IMAGING | Age: 68
Discharge: HOME | End: 2018-02-05
Attending: INTERNAL MEDICINE | Admitting: INTERNAL MEDICINE

## 2018-02-05 DIAGNOSIS — R91.8 MULTIPLE LUNG NODULES ON CT: ICD-10-CM

## 2018-02-05 DIAGNOSIS — C61 MALIGNANT NEOPLASM OF PROSTATE (HCC): ICD-10-CM

## 2018-02-05 DIAGNOSIS — C61 PROSTATE CANCER METASTATIC TO BONE (HCC): ICD-10-CM

## 2018-02-05 DIAGNOSIS — C79.51 PROSTATE CANCER METASTATIC TO BONE (HCC): ICD-10-CM

## 2018-02-15 RX ORDER — VENLAFAXINE HYDROCHLORIDE 150 MG/1
CAPSULE, EXTENDED RELEASE ORAL
Qty: 180 CAPSULE | Refills: 0 | Status: SHIPPED | OUTPATIENT
Start: 2018-02-15 | End: 2018-05-24 | Stop reason: SDUPTHER

## 2018-02-26 ENCOUNTER — TELEPHONE (OUTPATIENT)
Dept: FAMILY MEDICINE CLINIC | Age: 68
End: 2018-02-26

## 2018-02-27 ENCOUNTER — TELEPHONE (OUTPATIENT)
Dept: FAMILY MEDICINE CLINIC | Age: 68
End: 2018-02-27

## 2018-02-27 NOTE — TELEPHONE ENCOUNTER
Yuri's wife called- he is being seen this Thursday at/with [facility].  Can you please do the referral for [facility]

## 2018-03-02 ENCOUNTER — OFFICE VISIT (OUTPATIENT)
Dept: CARDIOLOGY CLINIC | Age: 68
End: 2018-03-02

## 2018-03-02 VITALS
DIASTOLIC BLOOD PRESSURE: 88 MMHG | BODY MASS INDEX: 22.24 KG/M2 | OXYGEN SATURATION: 98 % | HEART RATE: 70 BPM | SYSTOLIC BLOOD PRESSURE: 138 MMHG | WEIGHT: 155 LBS

## 2018-03-02 DIAGNOSIS — I10 ESSENTIAL HYPERTENSION: ICD-10-CM

## 2018-03-02 DIAGNOSIS — E78.2 MIXED HYPERLIPIDEMIA: Primary | ICD-10-CM

## 2018-03-02 DIAGNOSIS — I25.119 CORONARY ARTERY DISEASE INVOLVING NATIVE CORONARY ARTERY OF NATIVE HEART WITH ANGINA PECTORIS (HCC): ICD-10-CM

## 2018-03-02 PROCEDURE — 93000 ELECTROCARDIOGRAM COMPLETE: CPT | Performed by: NURSE PRACTITIONER

## 2018-03-02 PROCEDURE — 99214 OFFICE O/P EST MOD 30 MIN: CPT | Performed by: NURSE PRACTITIONER

## 2018-03-03 NOTE — PROGRESS NOTES
major weight gain or loss, fatigue, weakness, night sweats or fever. There's been no change in energy level, sleep pattern, or activity level. HEENT: No new vision difficulties or ringing in the ears. RESPIRATORY: No new SOB, PND, orthopnea or cough. CARDIOVASCULAR: See HPI  GI: No nausea, vomiting, diarrhea, constipation, abdominal pain or changes in bowel habits. : No urinary frequency, urgency, incontinence hematuria or dysuria. SKIN: No cyanosis or skin lesions. MUSCULOSKELETAL: No new muscle or joint pain. NEUROLOGICAL: No syncope or TIA-like symptoms. PSYCHIATRIC: No anxiety, pain, insomnia or depression    Objective:   PHYSICAL EXAM:        VITALS:  /88   Pulse 70   Wt 155 lb (70.3 kg)   SpO2 98%   BMI 22.24 kg/m²     CONSTITUTIONAL: Cooperative, no apparent distress, and appears well nourished / developed  NEUROLOGIC:  Awake and orientated to person, place and time. PSYCH: Calm affect. SKIN: Warm and dry. HEENT: Sclera non-icteric, normocephalic, neck supple, no elevation of JVP, normal carotid pulses with no bruits and thyroid normal size. LUNGS:  No increased work of breathing and clear to auscultation, no crackles or wheezing. CARDIOVASCULAR:  Regular rate and rhythm with no murmurs, gallops, rubs, or abnormal heart sounds, normal PMI. The apical impulses not displaced. Heart tones are crisp and normal. Cervical veins are not engorged                 JVP less than 8 cm H2O                                                                              The carotid upstroke is normal in amplitude and contour without delay or bruit    ABDOMEN:  Normal bowel sounds, non-distended and non-tender to palpation   EXT: No edema, no calf tenderness. Pulses are present bilaterally.     DATA:    Lab Results   Component Value Date    ALT 57 (H) 08/16/2017    AST 31 08/16/2017    ALKPHOS 62 08/16/2017    BILITOT 0.6 08/16/2017     Lab Results   Component Value Date CREATININE 0.7 (L) 2017    BUN 18 2017     2017    K 4.3 2017     2017    CO2 25 2017     Lab Results   Component Value Date    TSH 1.77 2014     Lab Results   Component Value Date    WBC 7.0 2017    HGB 8.8 (L) 2017    HCT 26.2 (L) 2017    MCV 99.0 2017     2017     No components found for: CHLPL  Lab Results   Component Value Date    TRIG 108 2017    TRIG 68 2016    TRIG 48 2016     Lab Results   Component Value Date    HDL 62 (H) 2017    HDL 83 (H) 2016    HDL 81 (H) 2016     Lab Results   Component Value Date    LDLCALC 65 2017    LDLCALC 34 2016    LDLCALC 161 (H) 2016     Lab Results   Component Value Date    LABVLDL 22 2017    LABVLDL 14 2016    LABVLDL 10 2016     Radiology Review:  Pertinent images / reports were reviewed as a part of this visit and reveals the following:  Last Myoview stress test: 17      Summary    There is normal isotope uptake at stress and rest. There is no evidence of    myocardial ischemia or scar.    Normal LV function.    Overall findings represent a low risk scan. OK to proceed with orthopedic    surgery at low cardiac risk. Last ECHO:   Technically difficult exam. There is no parasternal or short axis window.     Global ejection fraction is normal and estimated from 60 % to 65 %.     Aortic valve appears sclerotic but opens adequately.     Thickened mitral valve without evidence of stenosis. Mild mitral   regurgitation.     Mild tricuspid regurgitation with RVSP estimated at 35 mmHg.     A bubble study was performed and fails to show evidence of shunting. CAB/16  CABGX3 (LIMA-SVG-D1, SVG-PDA with PFO closure on 16.      Last Angiogram:   We were unsuccessful in delivering a stent  to the distal right coronary artery which was only treated with balloon  angioplasty using a 2.0 x 12 mm

## 2018-03-08 ENCOUNTER — HOSPITAL ENCOUNTER (OUTPATIENT)
Dept: NON INVASIVE DIAGNOSTICS | Age: 68
Discharge: HOME | End: 2018-03-08
Attending: NURSE PRACTITIONER | Admitting: NURSE PRACTITIONER

## 2018-03-08 DIAGNOSIS — I83.893 VARICOSE VEINS OF BILATERAL LOWER EXTREMITIES WITH OTHER COMPLICATIONS: ICD-10-CM

## 2018-03-08 NOTE — PROGRESS NOTES
Patient instructed on Antwan Protocol Stress Test Procedure including possible side effects and adverse reactions. Verbalizes knowledge and understanding and denies having any questions.

## 2018-03-14 RX ORDER — UMECLIDINIUM BROMIDE AND VILANTEROL TRIFENATATE 62.5; 25 UG/1; UG/1
1 POWDER RESPIRATORY (INHALATION) DAILY
Qty: 1 EACH | Refills: 0 | Status: SHIPPED | OUTPATIENT
Start: 2018-03-14 | End: 2018-05-30 | Stop reason: SDUPTHER

## 2018-03-20 ENCOUNTER — OFFICE VISIT (OUTPATIENT)
Dept: SLEEP MEDICINE | Age: 68
End: 2018-03-20

## 2018-03-20 VITALS
BODY MASS INDEX: 23.19 KG/M2 | DIASTOLIC BLOOD PRESSURE: 78 MMHG | HEART RATE: 69 BPM | OXYGEN SATURATION: 98 % | WEIGHT: 162 LBS | SYSTOLIC BLOOD PRESSURE: 127 MMHG | HEIGHT: 70 IN

## 2018-03-20 DIAGNOSIS — G47.10 HYPERSOMNIA: Primary | ICD-10-CM

## 2018-03-20 DIAGNOSIS — R06.83 SNORING: ICD-10-CM

## 2018-03-20 DIAGNOSIS — I10 ESSENTIAL HYPERTENSION: Chronic | ICD-10-CM

## 2018-03-20 DIAGNOSIS — I25.119 CORONARY ARTERY DISEASE INVOLVING NATIVE CORONARY ARTERY OF NATIVE HEART WITH ANGINA PECTORIS (HCC): Chronic | ICD-10-CM

## 2018-03-20 DIAGNOSIS — J43.9 PULMONARY EMPHYSEMA, UNSPECIFIED EMPHYSEMA TYPE (HCC): Chronic | ICD-10-CM

## 2018-03-20 DIAGNOSIS — J45.40 MODERATE PERSISTENT ASTHMA WITHOUT COMPLICATION: Chronic | ICD-10-CM

## 2018-03-20 PROCEDURE — 99215 OFFICE O/P EST HI 40 MIN: CPT | Performed by: INTERNAL MEDICINE

## 2018-03-20 ASSESSMENT — ENCOUNTER SYMPTOMS
NAUSEA: 0
CHOKING: 0
VOMITING: 0
ABDOMINAL DISTENTION: 0
BACK PAIN: 1
RHINORRHEA: 0
PHOTOPHOBIA: 0
ABDOMINAL PAIN: 0
CHEST TIGHTNESS: 0
SHORTNESS OF BREATH: 0
EYE PAIN: 0
ALLERGIC/IMMUNOLOGIC NEGATIVE: 1
APNEA: 1

## 2018-03-20 ASSESSMENT — SLEEP AND FATIGUE QUESTIONNAIRES
HOW LIKELY ARE YOU TO NOD OFF OR FALL ASLEEP IN A CAR, WHILE STOPPED FOR A FEW MINUTES IN TRAFFIC: 0
ESS TOTAL SCORE: 18
HOW LIKELY ARE YOU TO NOD OFF OR FALL ASLEEP WHILE LYING DOWN TO REST IN THE AFTERNOON WHEN CIRCUMSTANCES PERMIT: 3
HOW LIKELY ARE YOU TO NOD OFF OR FALL ASLEEP WHILE SITTING QUIETLY AFTER LUNCH WITHOUT ALCOHOL: 3
HOW LIKELY ARE YOU TO NOD OFF OR FALL ASLEEP WHEN YOU ARE A PASSENGER IN A CAR FOR AN HOUR WITHOUT A BREAK: 3
NECK CIRCUMFERENCE (INCHES): 16
HOW LIKELY ARE YOU TO NOD OFF OR FALL ASLEEP WHILE SITTING INACTIVE IN A PUBLIC PLACE: 3
HOW LIKELY ARE YOU TO NOD OFF OR FALL ASLEEP WHILE WATCHING TV: 3
HOW LIKELY ARE YOU TO NOD OFF OR FALL ASLEEP WHILE SITTING AND TALKING TO SOMEONE: 0
HOW LIKELY ARE YOU TO NOD OFF OR FALL ASLEEP WHILE SITTING AND READING: 3

## 2018-03-20 NOTE — LETTER
disease is not a candidate for HST. Need to look for CSA given chronic narcotics. Orders Placed This Encounter   Procedures    Baseline Diagnostic Sleep Study         If you have questions or concerns, please do not hesitate to call me. I look forward to following Kelsey Feeling along with you.     Sincerely,      Humberto Nation MD    CC providers:  Vern Wallace NP  [address]  [address] [facility] [address]  [place] [address] [facility], [address] [facility] [address]  VIA In [place] Marek  [address] [place] [address], [address] [place] [address] [address] [facility], [place]  [address]  [address] [facility],[address] [address]  VIA In [employer]

## 2018-03-20 NOTE — PROGRESS NOTES
Kristen Farah MD, FAASM, [facility] HEART AND SURGICAL [facility] CNP [place]  [address] [facility]  3rd Floor, [address] [facility], [address] [phone]   [address] [place] [address] Dr Martine Horn . Madelyn Cook 37 [phone]     [facility] Veronika Esparza SLEEP MEDICINE    Subjective:     Patient ID: Ginger Scanlon is a 78 y.o. male. Chief Complaint   Patient presents with    Snoring    Daytime Sleepiness       HPI:        Ginger Scanlon is a 78 y.o. male referred by [facility] for a sleep evaluation. He complains of snoring, periods of not breathing, tossing and turning, decreased concentration, excessive daytime sleepiness, awakening in the middle of the night because of urination, feels sleepy during the day but he denies knees buckling with laughing, completely or partially paralyzed while falling asleep or waking up. Symptoms began 4 years ago, gradually worsening since that time. Is on chronic narcotics for pain, takes then at night as well, has been on them for at least a year. Previous evaluation and treatment has included- none. DOT/CDL - No  FAA/'s license - No    PFT's:    Spirometry:  Flow volume loops were obstructed. The FEV-1/FVC ratio was decreased. The FEV-1 was 1.76 liters (52% of predicted), which was moderately decreased. The FVC was 3.07 liters (67% of predicted), which was decreased. Previous Report(s) Reviewed: historical medical records, office notes and referral letter/letters     [place] - Total score: 18    Caffeine Intake - 2 cups of caffeinated coffee per day(s)    Social History     Social History    Marital status:      Spouse name: N/A    Number of children: 2    Years of education: N/A     Occupational History    Not on file.      Social History Main Topics    Smoking status: Never Smoker    Smokeless tobacco: Current User     Types: Snuff      Comment: uses very little snuff    Alcohol use No      Comment: pt states he quit in Feb.    Drug use: No      Comment: marijuana in the 1960s    Sexual activity: No     Other Topics Concern    Not on file     Social History Narrative    Living Will: No.               Prior to Admission medications    Medication Sig Start Date End Date Taking? Authorizing Provider   ANORO ELLIPTA 62.5-25 MCG/INH AEPB inhaler INHALE 1 PUFF INTO THE LUNGS DAILY 3/14/18  Yes Jona Blue NP   venlafaxine (EFFEXOR XR) 150 MG extended release capsule TAKE 2 CAPSULES BY MOUTH DAILY 2/15/18  Yes Alan Owens CNP   metoprolol tartrate (LOPRESSOR) 25 MG tablet TAKE ONE TABLET BY MOUTH TWICE A DAY 2/2/18  Yes Alan Owens CNP   ARIPiprazole (ABILIFY) 10 MG tablet TAKE 1 TABLET BY MOUTH DAILY 1/31/18  Yes Charlene Calista Lesches, CNP   albuterol sulfate HFA (PROAIR HFA) 108 (90 Base) MCG/ACT inhaler Inhale 2 puffs into the lungs every 6 hours as needed for Wheezing 11/27/17  Yes Jan Cronin MD   atorvastatin (LIPITOR) 20 MG tablet  8/17/17  Yes Historical Provider, MD   dronabinol (MARINOL) 2.5 MG capsule  7/3/17  Yes Historical Provider, MD   predniSONE (DELTASONE) 5 MG tablet Take 5 mg by mouth 2 times daily   Yes Historical Provider, MD   morphine (MS CONTIN) 30 MG extended release tablet Take 1 tablet by mouth 2 times daily . 8/16/17  Yes Kiran Rothman CNP   oxyCODONE-acetaminophen (PERCOCET) 5-325 MG per tablet Take 1 tablet by mouth every 4 hours as needed for Pain .  8/16/17  Yes Kiran Rothman CNP   abiraterone acetate (ZYTIGA) 250 MG tablet Take 4 tablets by mouth daily 8/2/17  Yes Jose Alicea MD   hydrOXYzine (ATARAX) 50 MG tablet Take 1 tablet by mouth every 6 hours as needed for Itching or Anxiety (Has skin picking and anxiety) 7/12/17  Yes Alan Owens CNP   aspirin 81 MG tablet Take 1 tablet by mouth daily 7/7/17  Yes Alan Owens CNP   cholestyramine Lorrayne Horse) 4 G packet Take 1 packet by mouth 2 times daily as needed (diarrhea) 6/9/17  Yes Bossman Wilkes CNP   potassium chloride (KLOR-CON) 10 MEQ extended release tablet Take 2 tablets by mouth daily 6/9/17  Yes Bossman Wilkes CNP   tamsulosin (FLOMAX) 0.4 MG capsule Take 1 capsule by mouth daily 3/21/17  Yes Bossman Wilkes CNP   prochlorperazine (COMPAZINE) 5 MG tablet Take 1 tablet by mouth every 6 hours as needed for Nausea 3/21/17  Yes Bossman Wilkes CNP   cyclobenzaprine (FLEXERIL) 10 MG tablet TAKE ONE TABLET BY MOUTH THREE TIMES A DAY AS NEEDED FOR MUSCLE SPASMS 1/25/17  Yes Zoila Eaton CNP   traZODone (DESYREL) 100 MG tablet TAKE ONE TABLET BY MOUTH ONCE NIGHTLY  Patient taking differently: nightly as needed TAKE ONE TABLET BY MOUTH ONCE NIGHTLY 12/5/16  Yes Zoila Eaton CNP       Allergies as of 03/20/2018 - Review Complete 03/20/2018   Allergen Reaction Noted    Dicyclomine Other (See Comments) 04/21/2016    Dye [iodides] Swelling 05/15/2013    Lactose intolerance (gi)  01/12/2015    Pcn [penicillins] Swelling 05/15/2013    Plavix [clopidogrel] Other (See Comments) 04/11/2016       Patient Active Problem List   Diagnosis    Hyperlipidemia    Severe recurrent major depression without psychotic features ([address]    Alcohol dependence ([address]    Hypertension    TIA (transient ischemic attack)    Hemispheric carotid artery syndrome    Patent foramen ovale    Arachnoid cyst    NSTEMI (non-ST elevated myocardial infarction) ([address]    Unstable angina ([address]    Coronary artery disease involving native coronary artery of native heart with angina pectoris (HCC)    PFO (patent foramen ovale)    S/P CABG x 3    Left hip pain    Left leg weakness    Hydronephrosis of right kidney    Abnormal weight loss    Lytic bone lesions on xray    Bone pain    Prostate cancer ([address]    Neoplasm related pain    Anorexia    Drug-induced constipation    Prostate cancer metastatic to bone ([address]    Left facial numbness    Bone metastasis (HCC)    Weakness    Bilateral leg Negative for choking, chest tightness and shortness of breath. Cardiovascular: Negative. Gastrointestinal: Negative for abdominal distention, abdominal pain, nausea and vomiting. Endocrine: Negative for cold intolerance and heat intolerance. Genitourinary: Positive for frequency. Negative for difficulty urinating, dysuria and urgency. Musculoskeletal: Positive for back pain and gait problem. Negative for neck pain and neck stiffness. Skin: Negative. Allergic/Immunologic: Negative. Neurological: Negative for tremors, seizures, syncope and weakness. Hematological: Negative for adenopathy. Does not bruise/bleed easily. Psychiatric/Behavioral: Positive for sleep disturbance. Negative for agitation, behavioral problems and confusion. Objective:     Vitals:  Weight BMI   Wt Readings from Last 3 Encounters:   03/20/18 162 lb (73.5 kg)   03/02/18 155 lb (70.3 kg)   11/27/17 149 lb (67.6 kg)    Body mass index is 23.24 kg/m². BP HR SaO2   BP Readings from Last 3 Encounters:   03/20/18 127/78   03/02/18 138/88   11/27/17 (!) 137/90    Pulse Readings from Last 3 Encounters:   03/20/18 69   03/02/18 70   11/27/17 70    SpO2 Readings from Last 3 Encounters:   03/20/18 98%   03/02/18 98%   11/27/17 98%        Physical Exam   Constitutional: He is oriented to person, place, and time. Vital signs are normal. He appears cachectic. He is active and cooperative. Non-toxic appearance. He does not have a sickly appearance. HENT:   Head: Normocephalic and atraumatic. Not macrocephalic and not microcephalic. Right Ear: External ear normal.   Left Ear: External ear normal.   Nose: Mucosal edema and septal deviation present. Mouth/Throat: Uvula is midline and mucous membranes are normal. Posterior oropharyngeal edema present. No oropharyngeal exudate or tonsillar abscesses.    Tonsils:   absent bilaterally, normal appearance  Post. Pharynx:   normal mucosa  Neck circumference: 16  Inches     Eyes: pathophysiology, diagnosis, complications and treatment. Instructed him not to drive if drowsy. Continue medications per his PCP and other physicians. Limit caffeine use after 3pm. Will do PSG to rule-out ANN and other sleep disorders. 1 wk follow up after study to review his results. The chronic medical conditions listed are directly related to the primary diagnosis listed above. The management of the primary diagnosis affects the secondary diagnosis and vice versa. Cont meds for CAD, asthma, emphysema, and HTN. Given moderate obstructive disease is not a candidate for HST. Need to look for CSA given chronic narcotics.     Orders Placed This Encounter   Procedures    Baseline Diagnostic Sleep Study          Kalli Redding MD, Rosi Magaña, [facility] Director  [place] and [address] [facility]

## 2018-04-09 ENCOUNTER — OFFICE VISIT (OUTPATIENT)
Dept: NEUROLOGY | Age: 68
End: 2018-04-09

## 2018-04-09 VITALS
HEART RATE: 70 BPM | HEIGHT: 70 IN | WEIGHT: 157 LBS | DIASTOLIC BLOOD PRESSURE: 85 MMHG | SYSTOLIC BLOOD PRESSURE: 138 MMHG | BODY MASS INDEX: 22.48 KG/M2

## 2018-04-09 DIAGNOSIS — C61 PROSTATE CANCER METASTATIC TO BONE (HCC): ICD-10-CM

## 2018-04-09 DIAGNOSIS — G93.0 ARACHNOID CYST: ICD-10-CM

## 2018-04-09 DIAGNOSIS — C79.51 PROSTATE CANCER METASTATIC TO BONE (HCC): ICD-10-CM

## 2018-04-09 DIAGNOSIS — R27.0 ATAXIA: Primary | ICD-10-CM

## 2018-04-09 PROCEDURE — 99215 OFFICE O/P EST HI 40 MIN: CPT | Performed by: PSYCHIATRY & NEUROLOGY

## 2018-04-13 DIAGNOSIS — G45.9 TRANSIENT CEREBRAL ISCHEMIA, UNSPECIFIED TYPE: Primary | ICD-10-CM

## 2018-04-23 ENCOUNTER — HOSPITAL ENCOUNTER (OUTPATIENT)
Dept: MRI IMAGING | Age: 68
Discharge: HOME | End: 2018-04-23
Attending: PSYCHIATRY & NEUROLOGY | Admitting: PSYCHIATRY & NEUROLOGY

## 2018-04-23 DIAGNOSIS — C79.51 PROSTATE CANCER METASTATIC TO BONE (HCC): ICD-10-CM

## 2018-04-23 DIAGNOSIS — C61 PROSTATE CANCER METASTATIC TO BONE (HCC): ICD-10-CM

## 2018-04-23 DIAGNOSIS — G93.0 ARACHNOID CYST: ICD-10-CM

## 2018-04-23 DIAGNOSIS — R27.0 ATAXIA: ICD-10-CM

## 2018-04-23 LAB
ANION GAP SERPL CALCULATED.3IONS-SCNC: 8 MMOL/L (ref 3–16)
BUN BLDV-MCNC: 13 MG/DL (ref 7–20)
CALCIUM SERPL-MCNC: 9 MG/DL (ref 8.3–10.6)
CHLORIDE BLD-SCNC: 105 MMOL/L (ref 99–110)
CO2: 29 MMOL/L (ref 21–32)
CREAT SERPL-MCNC: 0.8 MG/DL (ref 0.8–1.3)
GFR AFRICAN AMERICAN: >60
GFR NON-AFRICAN AMERICAN: >60
GLUCOSE BLD-MCNC: 106 MG/DL (ref 70–99)
POTASSIUM SERPL-SCNC: 4.6 MMOL/L (ref 3.5–5.1)
SODIUM BLD-SCNC: 142 MMOL/L (ref 136–145)

## 2018-04-23 RX ORDER — SODIUM CHLORIDE 0.9 % (FLUSH) 0.9 %
10 SYRINGE (ML) INJECTION ONCE
Status: COMPLETED | OUTPATIENT
Start: 2018-04-23 | End: 2018-04-23

## 2018-04-23 RX ADMIN — Medication 10 ML: at 16:27

## 2018-05-08 DIAGNOSIS — F32.2 SEVERE MAJOR DEPRESSION (HCC): ICD-10-CM

## 2018-05-09 RX ORDER — ARIPIPRAZOLE 10 MG/1
10 TABLET ORAL DAILY
Qty: 90 TABLET | Refills: 0 | Status: SHIPPED | OUTPATIENT
Start: 2018-05-09 | End: 2018-07-30 | Stop reason: SDUPTHER

## 2018-05-21 ENCOUNTER — HOSPITAL ENCOUNTER (OUTPATIENT)
Dept: SLEEP MEDICINE | Age: 68
Discharge: HOME | End: 2018-05-23
Attending: NURSE PRACTITIONER | Admitting: NURSE PRACTITIONER

## 2018-05-21 DIAGNOSIS — J45.40 MODERATE PERSISTENT ASTHMA WITHOUT COMPLICATION: Chronic | ICD-10-CM

## 2018-05-21 DIAGNOSIS — G47.10 HYPERSOMNIA: ICD-10-CM

## 2018-05-21 DIAGNOSIS — R06.83 SNORING: ICD-10-CM

## 2018-05-21 PROCEDURE — 95811 POLYSOM 6/>YRS CPAP 4/> PARM: CPT | Performed by: INTERNAL MEDICINE

## 2018-05-24 DIAGNOSIS — G47.31 PRIMARY CENTRAL SLEEP APNEA: Primary | ICD-10-CM

## 2018-05-25 ENCOUNTER — TELEPHONE (OUTPATIENT)
Dept: SLEEP MEDICINE | Age: 68
End: 2018-05-25

## 2018-05-29 ENCOUNTER — OFFICE VISIT (OUTPATIENT)
Dept: PULMONOLOGY | Age: 68
End: 2018-05-29

## 2018-05-29 VITALS
RESPIRATION RATE: 14 BRPM | BODY MASS INDEX: 21.52 KG/M2 | SYSTOLIC BLOOD PRESSURE: 137 MMHG | DIASTOLIC BLOOD PRESSURE: 103 MMHG | OXYGEN SATURATION: 98 % | HEART RATE: 67 BPM | WEIGHT: 150 LBS

## 2018-05-29 DIAGNOSIS — G47.33 OSA (OBSTRUCTIVE SLEEP APNEA): ICD-10-CM

## 2018-05-29 DIAGNOSIS — J44.9 CHRONIC OBSTRUCTIVE PULMONARY DISEASE, UNSPECIFIED COPD TYPE (HCC): Primary | ICD-10-CM

## 2018-05-29 DIAGNOSIS — R91.8 LUNG NODULES: ICD-10-CM

## 2018-05-29 PROCEDURE — 99213 OFFICE O/P EST LOW 20 MIN: CPT | Performed by: INTERNAL MEDICINE

## 2018-05-29 RX ORDER — VENLAFAXINE HYDROCHLORIDE 150 MG/1
CAPSULE, EXTENDED RELEASE ORAL
Qty: 60 CAPSULE | Refills: 0 | Status: SHIPPED | OUTPATIENT
Start: 2018-05-29 | End: 2018-06-22 | Stop reason: SDUPTHER

## 2018-05-29 ASSESSMENT — ENCOUNTER SYMPTOMS
DIARRHEA: 0
ANAL BLEEDING: 0
BACK PAIN: 0
APNEA: 0
SORE THROAT: 0
CHOKING: 0
RHINORRHEA: 0
CONSTIPATION: 0
WHEEZING: 0
COUGH: 1
STRIDOR: 0
SHORTNESS OF BREATH: 1
SINUS PRESSURE: 0
CHEST TIGHTNESS: 0
VOICE CHANGE: 0
BLOOD IN STOOL: 0
ABDOMINAL PAIN: 0
ABDOMINAL DISTENTION: 0

## 2018-06-07 ENCOUNTER — HOSPITAL ENCOUNTER (OUTPATIENT)
Dept: SLEEP MEDICINE | Age: 68
Discharge: HOME | End: 2018-06-09
Attending: NURSE PRACTITIONER | Admitting: NURSE PRACTITIONER

## 2018-06-07 DIAGNOSIS — G47.31 PRIMARY CENTRAL SLEEP APNEA: ICD-10-CM

## 2018-06-07 PROCEDURE — 95811 POLYSOM 6/>YRS CPAP 4/> PARM: CPT | Performed by: INTERNAL MEDICINE

## 2018-06-13 ENCOUNTER — TELEPHONE (OUTPATIENT)
Dept: SLEEP MEDICINE | Age: 68
End: 2018-06-13

## 2018-06-14 ENCOUNTER — TELEPHONE (OUTPATIENT)
Dept: SLEEP MEDICINE | Age: 68
End: 2018-06-14

## 2018-06-15 RX ORDER — CYCLOBENZAPRINE HCL 10 MG
TABLET ORAL
Qty: 30 TABLET | Refills: 5 | Status: SHIPPED | OUTPATIENT
Start: 2018-06-15 | End: 2018-07-30 | Stop reason: SDUPTHER

## 2018-06-22 RX ORDER — VENLAFAXINE HYDROCHLORIDE 150 MG/1
CAPSULE, EXTENDED RELEASE ORAL
Qty: 30 CAPSULE | Refills: 0 | Status: SHIPPED | OUTPATIENT
Start: 2018-06-22 | End: 2018-07-30 | Stop reason: ALTCHOICE

## 2018-06-28 ENCOUNTER — TELEPHONE (OUTPATIENT)
Dept: ORTHOPEDIC SURGERY | Age: 68
End: 2018-06-28

## 2018-06-28 RX ORDER — CYCLOBENZAPRINE HCL 10 MG
TABLET ORAL
Qty: 30 TABLET | Refills: 5 | OUTPATIENT
Start: 2018-06-28

## 2018-06-28 NOTE — TELEPHONE ENCOUNTER
SUBMITTED A PA VIA CMM FOR Cyclobenzaprine HCl 10MG tablets  (Key: [id number])  [id number]  STATUS: PENDING

## 2018-06-29 ENCOUNTER — TELEPHONE (OUTPATIENT)
Dept: SLEEP MEDICINE | Age: 68
End: 2018-06-29

## 2018-06-29 ENCOUNTER — TELEPHONE (OUTPATIENT)
Dept: ORTHOPEDIC SURGERY | Age: 68
End: 2018-06-29

## 2018-07-02 ENCOUNTER — TELEPHONE (OUTPATIENT)
Dept: SLEEP MEDICINE | Age: 68
End: 2018-07-02

## 2018-07-02 ENCOUNTER — TELEPHONE (OUTPATIENT)
Dept: FAMILY MEDICINE CLINIC | Age: 68
End: 2018-07-02

## 2018-07-02 NOTE — TELEPHONE ENCOUNTER
Caitlyn Sutton from [employer] called today regarding patient Gloria Jean,  1950. Hammad Akhtar they are trying to get an ASV approved thru his insurance [id number] but they are being told this insurance does not use the same guidelines as Medicare. The insurance company is asking for a peer to peer with Jeanne Calvo would like to know the best time for them to call to reach Julienne for the peer to peer.     Caitlyn Sutton from [employer] can be reached @ [phone]

## 2018-07-02 NOTE — TELEPHONE ENCOUNTER
Patient is calling because [facility] is telling him that they never receive his medication CYCLOBENZAPRINE 10 MG - 3 TIMES A DAY AS NEEDED. I see we have sent this over on 6/15/2018 and it said receipt confirmed by pharmacy on 6/15/2018 @ 11:13 AM. Please give him a call back.

## 2018-07-02 NOTE — TELEPHONE ENCOUNTER
SPOKE TO PHARMACY AND MED IS REQUIRING A PRIOR AUTH, SUBMITTED VIA COVER MY MEDS AND PENDED. LEFT VM FOR PT TO CALL BACK TO LET HIM KNOW WHAT IS GOING ON.

## 2018-07-06 ENCOUNTER — HOSPITAL ENCOUNTER (OUTPATIENT)
Dept: OTHER | Age: 68
Discharge: HOME | End: 2018-07-06
Attending: INTERNAL MEDICINE | Admitting: INTERNAL MEDICINE

## 2018-07-06 DIAGNOSIS — Z19.2 HORMONE RESISTANT MALIGNANCY STATUS: ICD-10-CM

## 2018-07-09 ENCOUNTER — TELEPHONE (OUTPATIENT)
Dept: ORTHOPEDIC SURGERY | Age: 68
End: 2018-07-09

## 2018-07-11 ENCOUNTER — TELEPHONE (OUTPATIENT)
Dept: ORTHOPEDIC SURGERY | Age: 68
End: 2018-07-11

## 2018-07-11 NOTE — TELEPHONE ENCOUNTER
Spoke to patient's wife and she was expecting a call from our office to discuss her 's recent hip xray. I explained to her that the xrays were ordered by Dr Jolie Harrell not Dr Nico Degroot and we were unaware that he had them done. She stated since patient's xray showed fractures that she thought we would have called him. I explained again that we were unaware the xrays were done and offered an appointment today to see Dr Nico Degroot. She stated they had other appointments and could not make it.  Appointment was made for Friday

## 2018-07-11 NOTE — TELEPHONE ENCOUNTER
Pt wife is calling back. [phone]  She has not heard anything results on his xray of his hip/  Please advise.

## 2018-07-11 NOTE — TELEPHONE ENCOUNTER
Spoke to patient's wife and let her know that Dr Luis Aguilar looked at patient's xray and does not see any new fractures but does see healing of a previous fracture. Let her know that Dr Luis Aguilar does not feel he needs to come in at this time. She was ok with this and appt for Friday was cancelled.

## 2018-07-12 ENCOUNTER — TELEPHONE (OUTPATIENT)
Dept: FAMILY MEDICINE CLINIC | Age: 68
End: 2018-07-12

## 2018-07-12 NOTE — TELEPHONE ENCOUNTER
PAPER REFERRAL COMPLETED AND FAXED TO [facility]. SCANNED TO [employer].  [address]

## 2018-07-19 ENCOUNTER — HOSPITAL ENCOUNTER (OUTPATIENT)
Dept: CT IMAGING | Age: 68
Discharge: HOME | End: 2018-07-19
Attending: INTERNAL MEDICINE | Admitting: INTERNAL MEDICINE

## 2018-07-19 DIAGNOSIS — C61 PROSTATE CANCER (HCC): ICD-10-CM

## 2018-07-19 DIAGNOSIS — C61 MALIGNANT NEOPLASM OF PROSTATE (HCC): ICD-10-CM

## 2018-07-19 DIAGNOSIS — C79.51 BONE METASTASIS (HCC): ICD-10-CM

## 2018-07-20 ENCOUNTER — TELEPHONE (OUTPATIENT)
Dept: FAMILY MEDICINE CLINIC | Age: 68
End: 2018-07-20

## 2018-07-30 ENCOUNTER — OFFICE VISIT (OUTPATIENT)
Dept: FAMILY MEDICINE CLINIC | Age: 68
End: 2018-07-30

## 2018-07-30 VITALS
HEIGHT: 70 IN | WEIGHT: 156 LBS | HEART RATE: 55 BPM | BODY MASS INDEX: 22.33 KG/M2 | SYSTOLIC BLOOD PRESSURE: 102 MMHG | OXYGEN SATURATION: 93 % | DIASTOLIC BLOOD PRESSURE: 64 MMHG

## 2018-07-30 DIAGNOSIS — L28.0 NEURODERMATITIS: ICD-10-CM

## 2018-07-30 DIAGNOSIS — I25.10 CORONARY ARTERY DISEASE INVOLVING NATIVE CORONARY ARTERY OF NATIVE HEART WITHOUT ANGINA PECTORIS: ICD-10-CM

## 2018-07-30 DIAGNOSIS — G89.4 CHRONIC PAIN SYNDROME: ICD-10-CM

## 2018-07-30 DIAGNOSIS — F41.9 ANXIETY: ICD-10-CM

## 2018-07-30 DIAGNOSIS — C79.51 PROSTATE CANCER METASTATIC TO BONE (HCC): ICD-10-CM

## 2018-07-30 DIAGNOSIS — R35.0 URINE FREQUENCY: ICD-10-CM

## 2018-07-30 DIAGNOSIS — H91.93 BILATERAL HEARING LOSS, UNSPECIFIED HEARING LOSS TYPE: ICD-10-CM

## 2018-07-30 DIAGNOSIS — Z99.89 OSA ON CPAP: Primary | ICD-10-CM

## 2018-07-30 DIAGNOSIS — G47.33 OSA ON CPAP: Primary | ICD-10-CM

## 2018-07-30 DIAGNOSIS — C61 PROSTATE CANCER METASTATIC TO BONE (HCC): ICD-10-CM

## 2018-07-30 DIAGNOSIS — F32.2 SEVERE MAJOR DEPRESSION (HCC): ICD-10-CM

## 2018-07-30 PROCEDURE — 99214 OFFICE O/P EST MOD 30 MIN: CPT | Performed by: FAMILY MEDICINE

## 2018-07-30 RX ORDER — DESONIDE 0.5 MG/ML
LOTION TOPICAL
Qty: 118 ML | Refills: 1 | Status: SHIPPED | OUTPATIENT
Start: 2018-07-30 | End: 2019-01-01 | Stop reason: ALTCHOICE

## 2018-07-30 RX ORDER — ALPRAZOLAM 0.25 MG/1
1 TABLET ORAL 3 TIMES DAILY
COMMUNITY
Start: 2018-07-16 | End: 2019-01-01 | Stop reason: DRUGHIGH

## 2018-07-30 RX ORDER — CYCLOBENZAPRINE HCL 10 MG
TABLET ORAL
Qty: 30 TABLET | Refills: 2 | Status: SHIPPED | OUTPATIENT
Start: 2018-07-30 | End: 2019-01-01 | Stop reason: ALTCHOICE

## 2018-07-30 RX ORDER — VENLAFAXINE HYDROCHLORIDE 150 MG/1
CAPSULE, EXTENDED RELEASE ORAL
Qty: 180 CAPSULE | Refills: 1 | Status: SHIPPED | OUTPATIENT
Start: 2018-07-30 | End: 2019-01-01 | Stop reason: SDUPTHER

## 2018-07-30 RX ORDER — ARIPIPRAZOLE 10 MG/1
10 TABLET ORAL DAILY
Qty: 90 TABLET | Refills: 1 | Status: SHIPPED | OUTPATIENT
Start: 2018-07-30 | End: 2019-01-01 | Stop reason: SDUPTHER

## 2018-07-30 NOTE — PROGRESS NOTES
Subjective:      Patient ID: Herve Carver is a 80 y.o. male. HPI  ON XANAX RECENTLY DUE TO ANXIETY  Afraid when wakes up - sob - trying cpap device  Gets very nervous. Using xanax at night mainly but occ needs during day -   Feels like stops breathing when awake  Chief Complaint   Patient presents with    Depression     6 MO DEPRESSION ROUTINE FOLLOW UP     Other     NEW DX WITH SLEEP APNEA STARTED ON SLEEP APNEA, STARTED ON XANAX BY ONCOLOGIST    Anxiety     NEEDS TO KNOW IF HE IS OKAY TO TAKE TRAZODONE WITH Abbe Cotton    oncology rx's xanax  Has pain which seems to be worsening  Had pain/ numbness around hip  Hx of prostate cancer w/ bone pain  Just started PT/ OT per home nursing  Hx of cad/ tia/ htn/ hld w/ hx of alcohol abuse - hx of cabg x3. Metastatic disease in pelvis - had nondislaced left femoral fx , age indeterminate. psa is undetectable. Left hip is problem primarily  Weak kneed at times - legs almost give out - using cane to get around. Dr. Heriberto Warren feels that ca is stable - may f/u dr. Heriberto Warren regarding pain  On 3 morphine tabs/d in place of twice daily per dr. Mk Cardoso okay w/ opiates - had loose stools in past.  Depression seems improved on effexor/ abilify  Has hearing aids bilat ears  Dx w/ copd - never smoked. Dozes off easily in chair  On anoro -not sure if helps  Rare stabbing pain in chest - ? Indigestion vs. Angina  No cp w/ exertion  occ dizzy/ soboe - close to baseline  Bending over makes it hard to breathe - wheezing worsens  Dx w/ severe suad  BP Readings from Last 3 Encounters:   07/30/18 102/64   05/29/18 (!) 137/103   04/09/18 138/85     Pulse Readings from Last 3 Encounters:   07/30/18 55   05/29/18 67   04/09/18 70     Wt Readings from Last 3 Encounters:   07/30/18 156 lb (70.8 kg)   05/29/18 150 lb (68 kg)   04/09/18 157 lb (71.2 kg)   had biopsies done last year by derm but skin lesions of concern - otc doesn't seem to help  Scratching self.   Itchy at times - worse since chemo

## 2018-08-02 NOTE — TELEPHONE ENCOUNTER
Patient wife Laya Armas) calling back. She would like to discuss with dr David Haley the patients pain. They understand that there is no new fxs and he is stable as far ass the cancer goes. .   Since he is still in pain, they are wondering if this could be a NEW problem maybe a tumor pushing on his spine causing a pinched nerve? Patient is besides himself in pain and the pain medication is not giving any relief. They are wanting to know if Dr Subramanian Or can look at his MRI again and see if there is a pinched nerve? Also wanting to know any suggestions on who they should follow up with (oncoligist, PCP etc?)   They are looking for any guidance that can be given.    Please call  [phone]

## 2018-08-03 NOTE — TELEPHONE ENCOUNTER
Spoke to patient's wife and relayed Dr Nimisha Grijalva message.  She verbalized understanding and will contact his oncologist.

## 2018-08-03 NOTE — TELEPHONE ENCOUNTER
Further testing to look for pinched nerve can be ordered by PCP or oncologist.  Pain management should also be a part of his treatment program.

## 2018-08-10 ENCOUNTER — TELEPHONE (OUTPATIENT)
Dept: FAMILY MEDICINE CLINIC | Age: 68
End: 2018-08-10

## 2018-08-10 DIAGNOSIS — M25.552 LEFT HIP PAIN: Primary | ICD-10-CM

## 2018-08-10 NOTE — TELEPHONE ENCOUNTER
SPOKE TO ADY FROM Replaced by [facility], HE IS HAVING NUMEROUS FALLS, AND MEMORY ISSUES SHE THINKS THIS IS MEDICATION RELATED. SHE THINKS HE IS TOO SEDATED. ADY IS CALLING PT TO LET THEM KNOW TO GET XRAY AND TO HAVE THEM CALL AND SCHED AN APPT WITH ISAIAS OR MARK SOON. MARIA ISABEL FLORES CLOSE AFTER READING. David Eason

## 2018-08-17 ENCOUNTER — TELEPHONE (OUTPATIENT)
Dept: FAMILY MEDICINE CLINIC | Age: 68
End: 2018-08-17

## 2018-08-24 ENCOUNTER — HOSPITAL ENCOUNTER (OUTPATIENT)
Dept: MRI IMAGING | Age: 68
Discharge: HOME | End: 2018-08-24
Attending: NURSE PRACTITIONER | Admitting: NURSE PRACTITIONER

## 2018-08-24 DIAGNOSIS — M54.5 LOW BACK PAIN, UNSPECIFIED BACK PAIN LATERALITY, UNSPECIFIED CHRONICITY, WITH SCIATICA PRESENCE UNSPECIFIED: ICD-10-CM

## 2018-08-24 DIAGNOSIS — M54.50 LOW BACK PAIN: ICD-10-CM

## 2018-09-11 ENCOUNTER — TELEPHONE (OUTPATIENT)
Dept: FAMILY MEDICINE CLINIC | Age: 68
End: 2018-09-11

## 2018-09-11 ENCOUNTER — CLINICAL DOCUMENTATION (OUTPATIENT)
Dept: RADIATION ONCOLOGY | Age: 68
End: 2018-09-11

## 2018-09-11 NOTE — TELEPHONE ENCOUNTER
Pt  Bp up today  She took this on both arms 172/106 and other arm was 182/104. Pt did tell her he took his BP meds today he takes Metoprolol 25 mg. Pt is having some pain his hip and back. What do you want him to do?

## 2018-09-17 ENCOUNTER — TELEPHONE (OUTPATIENT)
Dept: FAMILY MEDICINE CLINIC | Age: 68
End: 2018-09-17

## 2018-09-17 NOTE — TELEPHONE ENCOUNTER
Vani Casper went to see this patient today  During her visit he told her that he was having some chestt pain x several days.  Mid Sternal. Not SOB  Guillermina did tell hem he needed to be evaluated in the ER  Patient said he would go

## 2018-09-18 ENCOUNTER — TELEPHONE (OUTPATIENT)
Dept: FAMILY MEDICINE CLINIC | Age: 68
End: 2018-09-18

## 2018-09-18 ENCOUNTER — TELEPHONE (OUTPATIENT)
Dept: PULMONOLOGY | Age: 68
End: 2018-09-18

## 2018-09-18 NOTE — TELEPHONE ENCOUNTER
Patient was seen in Er yesterday 9/17/18  Dx with Pulmonary Embolism  Needs to follow up with Dr Chandler Maguire in 2d ays  Wife refused appointment with  NP

## 2018-09-19 ENCOUNTER — TELEPHONE (OUTPATIENT)
Dept: PULMONOLOGY | Age: 68
End: 2018-09-19

## 2018-09-21 ENCOUNTER — OFFICE VISIT (OUTPATIENT)
Dept: SLEEP MEDICINE | Age: 68
End: 2018-09-21

## 2018-09-21 VITALS
HEIGHT: 69 IN | HEART RATE: 80 BPM | BODY MASS INDEX: 22.22 KG/M2 | OXYGEN SATURATION: 98 % | DIASTOLIC BLOOD PRESSURE: 90 MMHG | SYSTOLIC BLOOD PRESSURE: 138 MMHG | WEIGHT: 150 LBS

## 2018-09-21 DIAGNOSIS — J43.9 PULMONARY EMPHYSEMA, UNSPECIFIED EMPHYSEMA TYPE (HCC): Chronic | ICD-10-CM

## 2018-09-21 DIAGNOSIS — G47.31 PRIMARY CENTRAL SLEEP APNEA: Primary | ICD-10-CM

## 2018-09-21 DIAGNOSIS — I10 ESSENTIAL HYPERTENSION: Chronic | ICD-10-CM

## 2018-09-21 DIAGNOSIS — J45.40 MODERATE PERSISTENT ASTHMA WITHOUT COMPLICATION: Chronic | ICD-10-CM

## 2018-09-21 DIAGNOSIS — I25.119 CORONARY ARTERY DISEASE INVOLVING NATIVE CORONARY ARTERY OF NATIVE HEART WITH ANGINA PECTORIS (HCC): Chronic | ICD-10-CM

## 2018-09-21 PROCEDURE — 99214 OFFICE O/P EST MOD 30 MIN: CPT | Performed by: NURSE PRACTITIONER

## 2018-09-21 ASSESSMENT — ENCOUNTER SYMPTOMS
ABDOMINAL PAIN: 0
COUGH: 0
SHORTNESS OF BREATH: 0
ABDOMINAL DISTENTION: 0
APNEA: 0
SINUS PRESSURE: 0
RHINORRHEA: 0

## 2018-09-21 ASSESSMENT — SLEEP AND FATIGUE QUESTIONNAIRES
HOW LIKELY ARE YOU TO NOD OFF OR FALL ASLEEP WHILE LYING DOWN TO REST IN THE AFTERNOON WHEN CIRCUMSTANCES PERMIT: 3
HOW LIKELY ARE YOU TO NOD OFF OR FALL ASLEEP WHILE SITTING AND TALKING TO SOMEONE: 1
HOW LIKELY ARE YOU TO NOD OFF OR FALL ASLEEP IN A CAR, WHILE STOPPED FOR A FEW MINUTES IN TRAFFIC: 0
HOW LIKELY ARE YOU TO NOD OFF OR FALL ASLEEP WHILE SITTING AND READING: 1
HOW LIKELY ARE YOU TO NOD OFF OR FALL ASLEEP WHILE WATCHING TV: 2
HOW LIKELY ARE YOU TO NOD OFF OR FALL ASLEEP WHILE SITTING QUIETLY AFTER LUNCH WITHOUT ALCOHOL: 1
ESS TOTAL SCORE: 10
HOW LIKELY ARE YOU TO NOD OFF OR FALL ASLEEP WHEN YOU ARE A PASSENGER IN A CAR FOR AN HOUR WITHOUT A BREAK: 2
HOW LIKELY ARE YOU TO NOD OFF OR FALL ASLEEP WHILE SITTING INACTIVE IN A PUBLIC PLACE: 0

## 2018-09-21 NOTE — PROGRESS NOTES
waking  [] Yes  [x] No   Dry mouth upon waking   [x] Yes  [] No   Congestion upon waking   [] Yes  [x] No    Nose Bleeds  [] Yes  [x] No   Using Sleep Aides  trazodone- per PMD    Understands how to change humidification and/or tubing temperature for comfort while at home  [x] Yes  [] No     Difficulties falling asleep  [] Yes  [x] No   Difficulties staying asleep  [] Yes  [x] No   Approximate time to bed  10-11pm   Approximate wake time  7:30-8am   Taking Naps  occasional   If taking naps usual length  120 minutes   If taking naps using the machine  [] Yes  [x] No  [] NA   Drowsy when driving  [] Yes  [x] No     Does patient carry a DOT/CDL  [] Yes  [x] No     Does patient carry FAA/[employer] License   [] Yes  [x] No      Any concerns noted with the machine at this time  [] Yes  [x] No        Diagnosis Orders   1. Primary central sleep apnea      new diagnosis with treatment    2. Coronary artery disease involving native coronary artery of native heart with angina pectoris (HCC) Stable    3. Moderate persistent asthma without complication Stable    4. Pulmonary emphysema, unspecified emphysema type ([facility] [address] Stable    5. Essential hypertension Stable        The chronic medical conditions listed are directly related to the primary diagnosis listed above. The management of the primary diagnosis affects the secondary diagnosis and vice versa. Review of Systems   Constitutional: Positive for fatigue. Negative for appetite change, chills and fever. HENT: Negative for congestion, nosebleeds, rhinorrhea and sinus pressure. Respiratory: Negative for apnea, cough and shortness of breath. Cardiovascular: Negative for chest pain and palpitations. Gastrointestinal: Negative for abdominal distention and abdominal pain. Neurological: Negative for dizziness and headaches.        Social History     Social History    Marital status:      Spouse name: N/A    Number of children: 2    Years of education: N/A 108 (90 Base) MCG/ACT inhaler Inhale 2 puffs into the lungs every 6 hours as needed for Wheezing 11/27/17  Yes Patrizia Subramanian MD   atorvastatin (LIPITOR) 20 MG tablet  8/17/17  Yes Historical Provider, MD   dronabinol (MARINOL) 2.5 MG capsule  7/3/17  Yes Historical Provider, MD   morphine (MS CONTIN) 30 MG extended release tablet Take 1 tablet by mouth 2 times daily . 8/16/17  Yes VICKI Marrero CNP   oxyCODONE-acetaminophen (PERCOCET) 5-325 MG per tablet Take 1 tablet by mouth every 4 hours as needed for Pain .  8/16/17  Yes VICKI Marrero CNP   abiraterone acetate (ZYTIGA) 250 MG tablet Take 4 tablets by mouth daily 8/2/17  Yes Edwin Gregory MD   hydrOXYzine (ATARAX) 50 MG tablet Take 1 tablet by mouth every 6 hours as needed for Itching or Anxiety (Has skin picking and anxiety) 7/12/17  Yes VICKI Bailey CNP   aspirin 81 MG tablet Take 1 tablet by mouth daily 7/7/17  Yes VICKI Bailey CNP   cholestyramine Arlester Crofts) 4 G packet Take 1 packet by mouth 2 times daily as needed (diarrhea) 6/9/17  Yes VICKI Marrero CNP   potassium chloride (KLOR-CON) 10 MEQ extended release tablet Take 2 tablets by mouth daily 6/9/17  Yes VICKI Marrero CNP   tamsulosin (FLOMAX) 0.4 MG capsule Take 1 capsule by mouth daily 3/21/17  Yes VICKI Marrero CNP   prochlorperazine (COMPAZINE) 5 MG tablet Take 1 tablet by mouth every 6 hours as needed for Nausea 3/21/17  Yes VICKI Marrero CNP   traZODone (DESYREL) 100 MG tablet TAKE ONE TABLET BY MOUTH ONCE NIGHTLY  Patient taking differently: nightly as needed TAKE ONE TABLET BY MOUTH ONCE NIGHTLY 12/5/16  Yes VICKI Bailey CNP       Allergies as of 09/21/2018 - Review Complete 09/21/2018   Allergen Reaction Noted    Dicyclomine Other (See Comments) 04/21/2016    Dye [iodides] Swelling 05/15/2013    Eliquis [apixaban] Nausea Only 09/19/2018    Lactose intolerance (gi)  01/12/2015 are medically necessary.    - Patient using Other [employer]  for supplies  -Continue medications per his PCP and other physicians.   -Limit caffeine use after 3pm.    -Encouraged him to work on weight loss through diet and exercise. - Will see back to monitor AHI  - Patient will work with DME to trail another mask  -F/U: 3 month. No orders of the defined types were placed in this encounter. No orders of the defined types were placed in this encounter. No orders of the defined types were placed in this encounter.       Padma Bergeron, MSN, RN, CNP

## 2018-09-21 NOTE — LETTER
[facility]  [address] [place] [id number]  Phone: [phone]  Fax: [phone]    September 21, 2018       Patient: Estee Marks   MR Number: [id number]   YOB: 1950   Date of Visit: 9/21/2018       Audrey Covington was seen for a follow up visit today. Here is my assessment and plan as well as an attached copy of his visit today:    No problem-specific Assessment & Plan notes found for this encounter. If you have questions or concerns, please do not hesitate to call me. I look forward to following Jadiel Bergeron along with you.     Sincerely,    Connie Lorenz, APRN - CNP    CC providers:  Abe العراقي, [address] [place] [id number]  VIA [facility] [address]

## 2018-09-24 ENCOUNTER — OFFICE VISIT (OUTPATIENT)
Dept: PULMONOLOGY | Age: 68
End: 2018-09-24
Payer: COMMERCIAL

## 2018-09-24 ENCOUNTER — HOSPITAL ENCOUNTER (OUTPATIENT)
Dept: MRI IMAGING | Age: 68
Discharge: HOME | End: 2018-09-24
Payer: COMMERCIAL

## 2018-09-24 VITALS
RESPIRATION RATE: 16 BRPM | HEART RATE: 68 BPM | DIASTOLIC BLOOD PRESSURE: 90 MMHG | WEIGHT: 152 LBS | BODY MASS INDEX: 22.45 KG/M2 | SYSTOLIC BLOOD PRESSURE: 133 MMHG | OXYGEN SATURATION: 98 %

## 2018-09-24 DIAGNOSIS — C79.51 BONE METASTASIS (HCC): ICD-10-CM

## 2018-09-24 DIAGNOSIS — J43.2 CENTRILOBULAR EMPHYSEMA (HCC): Primary | ICD-10-CM

## 2018-09-24 DIAGNOSIS — I26.99 OTHER ACUTE PULMONARY EMBOLISM WITHOUT ACUTE COR PULMONALE (HCC): ICD-10-CM

## 2018-09-24 PROCEDURE — 72156 MRI NECK SPINE W/O & W/DYE: CPT

## 2018-09-24 PROCEDURE — 2580000003 HC RX 258: Performed by: RADIOLOGY

## 2018-09-24 PROCEDURE — 6360000004 HC RX CONTRAST MEDICATION: Performed by: RADIOLOGY

## 2018-09-24 PROCEDURE — 72157 MRI CHEST SPINE W/O & W/DYE: CPT

## 2018-09-24 PROCEDURE — A9579 GAD-BASE MR CONTRAST NOS,1ML: HCPCS | Performed by: RADIOLOGY

## 2018-09-24 PROCEDURE — 99214 OFFICE O/P EST MOD 30 MIN: CPT | Performed by: INTERNAL MEDICINE

## 2018-09-24 RX ORDER — SODIUM CHLORIDE 0.9 % (FLUSH) 0.9 %
10 SYRINGE (ML) INJECTION PRN
Status: DISCONTINUED | OUTPATIENT
Start: 2018-09-24 | End: 2018-09-27 | Stop reason: HOSPADM

## 2018-09-24 RX ORDER — SODIUM CHLORIDE 0.9 % (FLUSH) 0.9 %
10 SYRINGE (ML) INJECTION EVERY 12 HOURS SCHEDULED
Status: DISCONTINUED | OUTPATIENT
Start: 2018-09-24 | End: 2018-09-27 | Stop reason: HOSPADM

## 2018-09-24 RX ADMIN — Medication 10 ML: at 13:32

## 2018-09-24 RX ADMIN — GADOTERIDOL 13 ML: 279.3 INJECTION, SOLUTION INTRAVENOUS at 13:32

## 2018-09-24 ASSESSMENT — ENCOUNTER SYMPTOMS
APNEA: 0
VOICE CHANGE: 0
SINUS PRESSURE: 0
WHEEZING: 0
ABDOMINAL DISTENTION: 0
STRIDOR: 0
COUGH: 1
CHOKING: 0
SORE THROAT: 0
RHINORRHEA: 0
ABDOMINAL PAIN: 0
DIARRHEA: 0
BACK PAIN: 0
SHORTNESS OF BREATH: 1
CHEST TIGHTNESS: 0
ANAL BLEEDING: 0
BLOOD IN STOOL: 0
CONSTIPATION: 0

## 2018-09-24 NOTE — PROGRESS NOTES
Senia Chun    YOB: 1950     Date of Service:  9/24/2018     Chief Complaint   Patient presents with    Pulmonary Embolism     DX IN ER          HPI patient is here for a follow-up visit for incidental finding of PE on CTA chest.  Ration presented to the ER on 9/17 with chest tightness and shortness of breath, CTA chest showed a small right lower lobe branch pulmonary emboli. Apparently patient was not commenced on anticoagulation and the decision was deferred over to pulmonary. After discussing with Dr. Abbie Ballesteros, given complex history of prostate CA metastatic to the bones/spine patient has been commenced on xeralto for anticoagulation. Patient denies any worsening dyspnea and has no chest tightness at this time.     Allergies   Allergen Reactions    Dicyclomine Other (See Comments)     Muscle spasms    Dye [Iodides] Swelling     ivp dye     Eliquis [Apixaban] Nausea Only    Lactose Intolerance (Gi)     Pcn [Penicillins] Swelling     itch    Plavix  [Clopidogrel Bisulfate]     Plavix [Clopidogrel] Other (See Comments)     Flu like sx     Outpatient Prescriptions Marked as Taking for the 9/24/18 encounter (Office Visit) with Kenya Andrea MD   Medication Sig Dispense Refill    umeclidinium-vilanterol (ANORO ELLIPTA) 62.5-25 MCG/INH AEPB inhaler Inhale 1 puff into the lungs daily 1 each 3       Immunization History   Administered Date(s) Administered    Influenza, High Dose (Fluzone 65 yrs and older) 11/27/2017    Pneumococcal 13-valent Conjugate ([id number]) 03/17/2017    Pneumococcal Polysaccharide (Toxluangl73) 06/02/2017    Td 06/09/2009       Past Medical History:   Diagnosis Date    ADHD (attention deficit hyperactivity disorder)     Bipolar 1 disorder ([facility] [address]     CAD (coronary artery disease) 2016    Cancer ([facility] [address]     BONE    Cerebrovascular disease 2016    5 TIAs in 3 weeks - R side weakness, numbness R face    Depression     Hard of hearing     left ear    

## 2018-09-25 ENCOUNTER — TELEPHONE (OUTPATIENT)
Dept: CARDIOLOGY CLINIC | Age: 68
End: 2018-09-25

## 2018-09-25 DIAGNOSIS — I25.119 CORONARY ARTERY DISEASE INVOLVING NATIVE CORONARY ARTERY OF NATIVE HEART WITH ANGINA PECTORIS (HCC): Primary | ICD-10-CM

## 2018-09-27 ENCOUNTER — OFFICE VISIT (OUTPATIENT)
Dept: CARDIOLOGY CLINIC | Age: 68
End: 2018-09-27
Payer: COMMERCIAL

## 2018-09-27 ENCOUNTER — TELEPHONE (OUTPATIENT)
Dept: PULMONOLOGY | Age: 68
End: 2018-09-27

## 2018-09-27 VITALS
HEIGHT: 69 IN | HEART RATE: 64 BPM | BODY MASS INDEX: 22.75 KG/M2 | SYSTOLIC BLOOD PRESSURE: 152 MMHG | DIASTOLIC BLOOD PRESSURE: 90 MMHG | WEIGHT: 153.6 LBS

## 2018-09-27 DIAGNOSIS — I10 ESSENTIAL HYPERTENSION: ICD-10-CM

## 2018-09-27 DIAGNOSIS — E78.2 MIXED HYPERLIPIDEMIA: ICD-10-CM

## 2018-09-27 DIAGNOSIS — I34.0 NON-RHEUMATIC MITRAL REGURGITATION: ICD-10-CM

## 2018-09-27 DIAGNOSIS — I25.119 CORONARY ARTERY DISEASE INVOLVING NATIVE CORONARY ARTERY OF NATIVE HEART WITH ANGINA PECTORIS (HCC): Primary | ICD-10-CM

## 2018-09-27 PROCEDURE — 99214 OFFICE O/P EST MOD 30 MIN: CPT | Performed by: NURSE PRACTITIONER

## 2018-09-27 PROCEDURE — 93000 ELECTROCARDIOGRAM COMPLETE: CPT | Performed by: NURSE PRACTITIONER

## 2018-09-27 RX ORDER — LISINOPRIL 5 MG/1
5 TABLET ORAL DAILY
Qty: 30 TABLET | Refills: 3 | Status: SHIPPED | OUTPATIENT
Start: 2018-09-27 | End: 2019-01-01

## 2018-10-05 ENCOUNTER — TELEPHONE (OUTPATIENT)
Dept: CARDIOLOGY CLINIC | Age: 68
End: 2018-10-05

## 2018-10-05 NOTE — TELEPHONE ENCOUNTER
Spoke with pt's wife pt has had some episodes of dizziness but also has just started a blood thinner.  Ok to wait until Monday for advise

## 2018-10-09 ENCOUNTER — TELEPHONE (OUTPATIENT)
Dept: CARDIOLOGY CLINIC | Age: 68
End: 2018-10-09

## 2018-10-09 RX ORDER — ATORVASTATIN CALCIUM 20 MG/1
TABLET, FILM COATED ORAL
Qty: 90 TABLET | Refills: 3 | OUTPATIENT
Start: 2018-10-09

## 2018-10-10 ENCOUNTER — HOSPITAL ENCOUNTER (OUTPATIENT)
Dept: NON INVASIVE DIAGNOSTICS | Age: 68
Discharge: HOME | End: 2018-10-10
Payer: COMMERCIAL

## 2018-10-10 LAB
LEFT VENTRICULAR EJECTION FRACTION HIGH VALUE: 60 %
LEFT VENTRICULAR EJECTION FRACTION MODE: NORMAL
LV EF: 60 %
LV EF: 62 %
LVEF MODALITY: NORMAL
LVEF MODALITY: NORMAL

## 2018-10-10 PROCEDURE — 6360000002 HC RX W HCPCS: Performed by: NURSE PRACTITIONER

## 2018-10-10 PROCEDURE — 93017 CV STRESS TEST TRACING ONLY: CPT | Performed by: INTERNAL MEDICINE

## 2018-10-10 PROCEDURE — 6360000002 HC RX W HCPCS: Performed by: REGISTERED NURSE

## 2018-10-10 PROCEDURE — A9502 TC99M TETROFOSMIN: HCPCS | Performed by: REGISTERED NURSE

## 2018-10-10 PROCEDURE — 93306 TTE W/DOPPLER COMPLETE: CPT

## 2018-10-10 PROCEDURE — 78452 HT MUSCLE IMAGE SPECT MULT: CPT | Performed by: INTERNAL MEDICINE

## 2018-10-10 PROCEDURE — 3430000000 HC RX DIAGNOSTIC RADIOPHARMACEUTICAL: Performed by: REGISTERED NURSE

## 2018-10-10 RX ORDER — AMINOPHYLLINE DIHYDRATE 25 MG/ML
100 INJECTION, SOLUTION INTRAVENOUS ONCE
Status: COMPLETED | OUTPATIENT
Start: 2018-10-10 | End: 2018-10-10

## 2018-10-10 RX ADMIN — AMINOPHYLLINE 100 MG: 25 INJECTION, SOLUTION INTRAVENOUS at 14:09

## 2018-10-10 RX ADMIN — REGADENOSON 0.4 MG: 0.08 INJECTION, SOLUTION INTRAVENOUS at 13:59

## 2018-10-10 RX ADMIN — TETROFOSMIN 30 MILLICURIE: 0.23 INJECTION, POWDER, LYOPHILIZED, FOR SOLUTION INTRAVENOUS at 14:05

## 2018-10-10 RX ADMIN — TETROFOSMIN 10 MILLICURIE: 0.23 INJECTION, POWDER, LYOPHILIZED, FOR SOLUTION INTRAVENOUS at 12:58

## 2018-10-10 NOTE — PROGRESS NOTES
At peak stress after Lexiscan T-wave became inverted in lead 2. After one  Minute returned to baseline. Denies nausea. Stress test completed. Pt. Tolerated well. Ama Arora RN.

## 2018-10-11 ENCOUNTER — TELEPHONE (OUTPATIENT)
Dept: CARDIOLOGY CLINIC | Age: 68
End: 2018-10-11

## 2018-10-12 ENCOUNTER — TELEPHONE (OUTPATIENT)
Dept: FAMILY MEDICINE CLINIC | Age: 68
End: 2018-10-12

## 2018-10-12 RX ORDER — DOXYCYCLINE HYCLATE 100 MG
100 TABLET ORAL 2 TIMES DAILY
Qty: 14 TABLET | Refills: 0 | Status: SHIPPED | OUTPATIENT
Start: 2018-10-12 | End: 2018-10-19

## 2018-11-02 RX ORDER — ATORVASTATIN CALCIUM 20 MG/1
TABLET, FILM COATED ORAL
Qty: 90 TABLET | Refills: 3 | Status: ON HOLD | OUTPATIENT
Start: 2018-11-02 | End: 2019-01-01 | Stop reason: SDUPTHER

## 2018-11-06 NOTE — TELEPHONE ENCOUNTER
Pt is only on  3 mos regimen so he should not need a 90 day. I called and  Lm asking him to call me back.

## 2018-12-10 NOTE — PATIENT INSTRUCTIONS
Vaccine Information Sheet, \"Influenza - Inactivated\"  given to Armando Ireland, or parent/legal guardian of  Armando Ireland and verbalized understanding. Patient responses:    Have you ever had a reaction to a flu vaccine? No  Are you able to eat eggs without adverse effects? Yes  Do you have any current illness? No  Have you ever had Guillian Avon Syndrome? No    Flu vaccine given per order. Please see immunization tab.

## 2018-12-10 NOTE — PROGRESS NOTES
surgery.  Suicide attempt [facility])     [facility] hospitalization    Wound infection 2014    L thigh. [facility] hospitalization     Past Surgical History:   Procedure Laterality Date    CARDIAC SURGERY      COLONOSCOPY  2017    CORONARY ARTERY BYPASS GRAFT  16    cabgx3, PFO closure Samule Gamma)    FEMUR FRACTURE SURGERY Left 2017    Prophylactic IM nail left femur for impending fracture    LUMBAR LAMINECTOMY      TONSILLECTOMY       Family History   Problem Relation Age of Onset    Hypertension Father 68        , Parkinson's, HTN.  Parkinsonism Father    [facility] Other Mother 80        Alive - hip replacement. Review of Systems:  Review of Systems   Constitutional: Negative for activity change, appetite change, fatigue and fever. HENT: Negative for congestion, ear discharge, ear pain, postnasal drip, rhinorrhea, sinus pressure, sneezing, sore throat, tinnitus and voice change. Respiratory: Positive for shortness of breath. Negative for apnea, cough, choking, chest tightness, wheezing and stridor. Cardiovascular: Negative for chest pain, palpitations and leg swelling. Gastrointestinal: Negative for abdominal distention, abdominal pain, anal bleeding, blood in stool, constipation and diarrhea. Musculoskeletal: Negative for arthralgias, back pain and gait problem. Skin: Negative for pallor and rash. Allergic/Immunologic: Negative for environmental allergies. Neurological: Negative for dizziness, tremors, seizures, syncope, speech difficulty, weakness, light-headedness, numbness and headaches. Hematological: Negative for adenopathy. Does not bruise/bleed easily. Psychiatric/Behavioral: Negative for sleep disturbance. Vitals:    12/10/18 1320   BP: 129/80   Pulse: 58   Resp: 18   SpO2: 99%   Weight: 152 lb (68.9 kg)     Body mass index is 22.45 kg/m².      Wt Readings from Last 3 Encounters:   12/10/18 152 lb (68.9 kg)   18 153 lb 9.6 oz (69.7 kg)   09/24/18 152 lb (68.9 kg)     BP Readings from Last 3 Encounters:   12/10/18 129/80   09/27/18 (!) 152/90   09/24/18 (!) 133/90         Physical Exam   Constitutional: He is oriented to person, place, and time. He appears well-developed and well-nourished. No distress. HENT:   Mouth/Throat: Oropharynx is clear and moist. No oropharyngeal exudate. Cardiovascular: Normal rate, regular rhythm, normal heart sounds and intact distal pulses. No murmur heard. Pulmonary/Chest: Breath sounds normal. No respiratory distress. He has no wheezes. He has no rales. He exhibits no tenderness. Abdominal: He exhibits no distension and no mass. There is no tenderness. There is no rebound and no guarding. Musculoskeletal: He exhibits no edema or deformity. Neurological: He is alert and oriented to person, place, and time. He displays normal reflexes. No cranial nerve deficit. He exhibits normal muscle tone. Coordination normal.   Skin: No rash noted. He is not diaphoretic. No erythema. No pallor. Health Maintenance   Topic Date Due    Hepatitis C screen  1950    Shingles Vaccine (1 of 2 - 2 Dose Series) 07/06/2001    DTaP/Tdap/Td vaccine (1 - Tdap) 06/10/2009    Flu vaccine (1) 09/01/2018    Potassium monitoring  09/17/2019    Creatinine monitoring  09/17/2019    Lipid screen  04/03/2022    Colon cancer screen colonoscopy  06/06/2027    Pneumococcal low/med risk  Completed          Assessment/Plan:     Diagnosis Orders   1. Need for influenza vaccination  INFLUENZA, HIGH DOSE, 65 YRS +, IM, PF, PREFILL SYR, 0.5ML (FLUZONE HD)    2. Moderate COPD  3. History of PE    Patient noted to have moderate COPD, continue on anoro ellipta and albuterol as needed. Stable symptoms. Michel Thomas d/taryn recently by Oncology-incidental small PE noted in September, completed 3 month treatment. H/O Falls. Pt has advanced prostate cancer.     Influenza vaccination will be administered

## 2019-01-01 ENCOUNTER — CARE COORDINATION (OUTPATIENT)
Dept: CASE MANAGEMENT | Age: 69
End: 2019-01-01

## 2019-01-01 ENCOUNTER — HOSPITAL ENCOUNTER (INPATIENT)
Age: 69
LOS: 2 days | Discharge: HOME HEALTH | DRG: 092 | End: 2019-08-31
Attending: EMERGENCY MEDICINE | Admitting: HOSPITALIST
Payer: MEDICARE

## 2019-01-01 ENCOUNTER — HOSPITAL ENCOUNTER (INPATIENT)
Age: 69
LOS: 1 days | Discharge: HOME | DRG: 988 | End: 2019-06-01
Attending: EMERGENCY MEDICINE | Admitting: INTERNAL MEDICINE
Payer: MEDICARE

## 2019-01-01 ENCOUNTER — APPOINTMENT (OUTPATIENT)
Dept: CT IMAGING | Age: 69
End: 2019-01-01
Payer: MEDICARE

## 2019-01-01 ENCOUNTER — POST-OP TELEPHONE (OUTPATIENT)
Dept: INTERVENTIONAL RADIOLOGY/VASCULAR | Age: 69
End: 2019-01-01

## 2019-01-01 ENCOUNTER — HOSPITAL ENCOUNTER (OUTPATIENT)
Dept: GENERAL RADIOLOGY | Age: 69
Discharge: HOME | End: 2019-05-21
Payer: MEDICARE

## 2019-01-01 ENCOUNTER — APPOINTMENT (OUTPATIENT)
Dept: CT IMAGING | Age: 69
DRG: 689 | End: 2019-01-01
Payer: MEDICARE

## 2019-01-01 ENCOUNTER — HOSPITAL ENCOUNTER (OUTPATIENT)
Dept: CT IMAGING | Age: 69
Discharge: HOME | End: 2019-03-01
Payer: MEDICARE

## 2019-01-01 ENCOUNTER — APPOINTMENT (OUTPATIENT)
Dept: GENERAL RADIOLOGY | Age: 69
DRG: 689 | End: 2019-01-01
Payer: MEDICARE

## 2019-01-01 ENCOUNTER — APPOINTMENT (OUTPATIENT)
Dept: MRI IMAGING | Age: 69
DRG: 988 | End: 2019-01-01
Payer: MEDICARE

## 2019-01-01 ENCOUNTER — OFFICE VISIT (OUTPATIENT)
Dept: PULMONOLOGY | Age: 69
End: 2019-01-01
Payer: MEDICARE

## 2019-01-01 ENCOUNTER — ANESTHESIA (OUTPATIENT)
Dept: OPERATING ROOM | Age: 69
End: 2019-01-01
Payer: MEDICARE

## 2019-01-01 ENCOUNTER — APPOINTMENT (OUTPATIENT)
Dept: CT IMAGING | Age: 69
DRG: 988 | End: 2019-01-01
Payer: MEDICARE

## 2019-01-01 ENCOUNTER — TELEPHONE (OUTPATIENT)
Dept: PULMONOLOGY | Age: 69
End: 2019-01-01

## 2019-01-01 ENCOUNTER — HOSPITAL ENCOUNTER (OUTPATIENT)
Age: 69
Setting detail: OUTPATIENT SURGERY
Discharge: HOME | End: 2019-05-06
Attending: UROLOGY | Admitting: UROLOGY
Payer: MEDICARE

## 2019-01-01 ENCOUNTER — APPOINTMENT (OUTPATIENT)
Dept: GENERAL RADIOLOGY | Age: 69
DRG: 092 | End: 2019-01-01
Payer: MEDICARE

## 2019-01-01 ENCOUNTER — HOSPITAL ENCOUNTER (OUTPATIENT)
Dept: GENERAL RADIOLOGY | Age: 69
Discharge: HOME | End: 2019-03-22
Payer: MEDICARE

## 2019-01-01 ENCOUNTER — ANESTHESIA EVENT (OUTPATIENT)
Dept: OPERATING ROOM | Age: 69
End: 2019-01-01
Payer: MEDICARE

## 2019-01-01 ENCOUNTER — OFFICE VISIT (OUTPATIENT)
Dept: CARDIOLOGY CLINIC | Age: 69
End: 2019-01-01
Payer: MEDICARE

## 2019-01-01 ENCOUNTER — APPOINTMENT (OUTPATIENT)
Dept: GENERAL RADIOLOGY | Age: 69
DRG: 698 | End: 2019-01-01
Payer: MEDICARE

## 2019-01-01 ENCOUNTER — APPOINTMENT (OUTPATIENT)
Dept: GENERAL RADIOLOGY | Age: 69
DRG: 947 | End: 2019-01-01
Payer: MEDICARE

## 2019-01-01 ENCOUNTER — HOSPITAL ENCOUNTER (OUTPATIENT)
Age: 69
Setting detail: OUTPATIENT SURGERY
Discharge: HOME | End: 2019-06-03
Attending: UROLOGY | Admitting: UROLOGY
Payer: MEDICARE

## 2019-01-01 ENCOUNTER — HOSPITAL ENCOUNTER (OUTPATIENT)
Dept: CT IMAGING | Age: 69
Discharge: HOME | End: 2019-02-19
Payer: MEDICARE

## 2019-01-01 ENCOUNTER — HOSPITAL ENCOUNTER (OUTPATIENT)
Dept: CT IMAGING | Age: 69
Discharge: HOME | End: 2019-05-21
Payer: MEDICARE

## 2019-01-01 ENCOUNTER — HOSPITAL ENCOUNTER (OUTPATIENT)
Dept: ULTRASOUND IMAGING | Age: 69
Discharge: HOME | End: 2019-05-06
Payer: MEDICARE

## 2019-01-01 ENCOUNTER — APPOINTMENT (OUTPATIENT)
Dept: CT IMAGING | Age: 69
DRG: 947 | End: 2019-01-01
Payer: MEDICARE

## 2019-01-01 ENCOUNTER — HOSPITAL ENCOUNTER (EMERGENCY)
Age: 69
Discharge: HOME | End: 2019-08-25
Attending: EMERGENCY MEDICINE
Payer: MEDICARE

## 2019-01-01 ENCOUNTER — HOSPITAL ENCOUNTER (OUTPATIENT)
Dept: GENERAL RADIOLOGY | Age: 69
Discharge: HOME | End: 2019-03-27
Payer: MEDICARE

## 2019-01-01 ENCOUNTER — APPOINTMENT (OUTPATIENT)
Dept: CT IMAGING | Age: 69
DRG: 092 | End: 2019-01-01
Payer: MEDICARE

## 2019-01-01 ENCOUNTER — CARE COORDINATION (OUTPATIENT)
Dept: CARE COORDINATION | Age: 69
End: 2019-01-01

## 2019-01-01 ENCOUNTER — HOSPITAL ENCOUNTER (OUTPATIENT)
Dept: CT IMAGING | Age: 69
Discharge: HOME | End: 2019-05-08
Payer: MEDICARE

## 2019-01-01 ENCOUNTER — APPOINTMENT (OUTPATIENT)
Dept: ULTRASOUND IMAGING | Age: 69
DRG: 988 | End: 2019-01-01
Payer: MEDICARE

## 2019-01-01 ENCOUNTER — HOSPITAL ENCOUNTER (OUTPATIENT)
Age: 69
Discharge: HOME | End: 2019-03-22
Payer: MEDICARE

## 2019-01-01 ENCOUNTER — TELEPHONE (OUTPATIENT)
Dept: FAMILY MEDICINE CLINIC | Age: 69
End: 2019-01-01

## 2019-01-01 ENCOUNTER — APPOINTMENT (OUTPATIENT)
Dept: MRI IMAGING | Age: 69
DRG: 092 | End: 2019-01-01
Payer: MEDICARE

## 2019-01-01 ENCOUNTER — HOSPITAL ENCOUNTER (EMERGENCY)
Age: 69
Discharge: HOME | End: 2019-07-29
Payer: MEDICARE

## 2019-01-01 ENCOUNTER — FOLLOWUP TELEPHONE ENCOUNTER (OUTPATIENT)
Dept: INPATIENT UNIT | Age: 69
End: 2019-01-01

## 2019-01-01 ENCOUNTER — HOSPITAL ENCOUNTER (EMERGENCY)
Age: 69
Discharge: HOME | End: 2019-06-08
Attending: EMERGENCY MEDICINE
Payer: MEDICARE

## 2019-01-01 ENCOUNTER — HOSPITAL ENCOUNTER (INPATIENT)
Age: 69
LOS: 6 days | Discharge: HOME HEALTH | DRG: 698 | End: 2019-08-21
Attending: EMERGENCY MEDICINE | Admitting: INTERNAL MEDICINE
Payer: MEDICARE

## 2019-01-01 ENCOUNTER — APPOINTMENT (OUTPATIENT)
Dept: GENERAL RADIOLOGY | Age: 69
End: 2019-01-01
Payer: MEDICARE

## 2019-01-01 ENCOUNTER — HOSPITAL ENCOUNTER (INPATIENT)
Age: 69
LOS: 16 days | Discharge: HOME HEALTH | DRG: 689 | End: 2019-10-11
Attending: EMERGENCY MEDICINE | Admitting: HOSPITALIST
Payer: MEDICARE

## 2019-01-01 ENCOUNTER — HOSPITAL ENCOUNTER (OUTPATIENT)
Dept: CT IMAGING | Age: 69
Discharge: HOME | End: 2019-07-29
Payer: MEDICARE

## 2019-01-01 ENCOUNTER — HOSPITAL ENCOUNTER (INPATIENT)
Age: 69
LOS: 4 days | Discharge: HOSPICE | DRG: 947 | End: 2019-11-13
Attending: EMERGENCY MEDICINE | Admitting: INTERNAL MEDICINE
Payer: MEDICARE

## 2019-01-01 ENCOUNTER — HOSPITAL ENCOUNTER (OUTPATIENT)
Dept: INTERVENTIONAL RADIOLOGY/VASCULAR | Age: 69
Discharge: HOME | End: 2019-11-04
Payer: MEDICARE

## 2019-01-01 ENCOUNTER — TELEPHONE (OUTPATIENT)
Dept: CARDIOLOGY CLINIC | Age: 69
End: 2019-01-01

## 2019-01-01 VITALS
SYSTOLIC BLOOD PRESSURE: 158 MMHG | RESPIRATION RATE: 16 BRPM | WEIGHT: 146.6 LBS | HEIGHT: 70 IN | BODY MASS INDEX: 20.99 KG/M2 | DIASTOLIC BLOOD PRESSURE: 83 MMHG | OXYGEN SATURATION: 98 % | TEMPERATURE: 97.9 F | HEART RATE: 61 BPM

## 2019-01-01 VITALS
HEIGHT: 69 IN | BODY MASS INDEX: 22.22 KG/M2 | WEIGHT: 150 LBS | HEART RATE: 57 BPM | DIASTOLIC BLOOD PRESSURE: 82 MMHG | SYSTOLIC BLOOD PRESSURE: 136 MMHG | OXYGEN SATURATION: 98 %

## 2019-01-01 VITALS
RESPIRATION RATE: 18 BRPM | DIASTOLIC BLOOD PRESSURE: 68 MMHG | SYSTOLIC BLOOD PRESSURE: 103 MMHG | HEIGHT: 70 IN | WEIGHT: 135 LBS | OXYGEN SATURATION: 98 % | HEART RATE: 64 BPM | BODY MASS INDEX: 19.33 KG/M2 | TEMPERATURE: 97 F

## 2019-01-01 VITALS
RESPIRATION RATE: 16 BRPM | SYSTOLIC BLOOD PRESSURE: 147 MMHG | HEIGHT: 69 IN | TEMPERATURE: 97 F | DIASTOLIC BLOOD PRESSURE: 86 MMHG | WEIGHT: 146 LBS | HEART RATE: 73 BPM | BODY MASS INDEX: 21.62 KG/M2 | OXYGEN SATURATION: 99 %

## 2019-01-01 VITALS
TEMPERATURE: 98.2 F | HEART RATE: 54 BPM | WEIGHT: 144 LBS | DIASTOLIC BLOOD PRESSURE: 82 MMHG | RESPIRATION RATE: 18 BRPM | BODY MASS INDEX: 20.66 KG/M2 | SYSTOLIC BLOOD PRESSURE: 129 MMHG | OXYGEN SATURATION: 99 %

## 2019-01-01 VITALS
TEMPERATURE: 98.4 F | BODY MASS INDEX: 19.34 KG/M2 | WEIGHT: 135.1 LBS | HEIGHT: 70 IN | SYSTOLIC BLOOD PRESSURE: 105 MMHG | DIASTOLIC BLOOD PRESSURE: 69 MMHG | RESPIRATION RATE: 16 BRPM | OXYGEN SATURATION: 92 % | HEART RATE: 91 BPM

## 2019-01-01 VITALS
HEART RATE: 70 BPM | RESPIRATION RATE: 16 BRPM | WEIGHT: 144 LBS | OXYGEN SATURATION: 97 % | DIASTOLIC BLOOD PRESSURE: 74 MMHG | SYSTOLIC BLOOD PRESSURE: 135 MMHG | BODY MASS INDEX: 20.66 KG/M2

## 2019-01-01 VITALS
DIASTOLIC BLOOD PRESSURE: 96 MMHG | SYSTOLIC BLOOD PRESSURE: 162 MMHG | TEMPERATURE: 97.7 F | WEIGHT: 142.6 LBS | OXYGEN SATURATION: 95 % | BODY MASS INDEX: 20.46 KG/M2 | HEART RATE: 79 BPM | RESPIRATION RATE: 16 BRPM

## 2019-01-01 VITALS
SYSTOLIC BLOOD PRESSURE: 152 MMHG | HEIGHT: 69 IN | DIASTOLIC BLOOD PRESSURE: 83 MMHG | OXYGEN SATURATION: 96 % | WEIGHT: 150 LBS | BODY MASS INDEX: 22.22 KG/M2 | HEART RATE: 85 BPM | RESPIRATION RATE: 16 BRPM | TEMPERATURE: 97.7 F

## 2019-01-01 VITALS
TEMPERATURE: 97.3 F | WEIGHT: 125 LBS | SYSTOLIC BLOOD PRESSURE: 134 MMHG | HEART RATE: 88 BPM | RESPIRATION RATE: 16 BRPM | BODY MASS INDEX: 17.5 KG/M2 | DIASTOLIC BLOOD PRESSURE: 83 MMHG | OXYGEN SATURATION: 94 % | HEIGHT: 71 IN

## 2019-01-01 VITALS — OXYGEN SATURATION: 100 % | DIASTOLIC BLOOD PRESSURE: 78 MMHG | SYSTOLIC BLOOD PRESSURE: 150 MMHG | TEMPERATURE: 95.9 F

## 2019-01-01 VITALS
DIASTOLIC BLOOD PRESSURE: 86 MMHG | TEMPERATURE: 98.2 F | RESPIRATION RATE: 18 BRPM | OXYGEN SATURATION: 95 % | HEART RATE: 53 BPM | SYSTOLIC BLOOD PRESSURE: 162 MMHG

## 2019-01-01 VITALS
OXYGEN SATURATION: 100 % | SYSTOLIC BLOOD PRESSURE: 171 MMHG | DIASTOLIC BLOOD PRESSURE: 89 MMHG | RESPIRATION RATE: 30 BRPM

## 2019-01-01 VITALS
TEMPERATURE: 99.1 F | WEIGHT: 150 LBS | RESPIRATION RATE: 18 BRPM | HEIGHT: 70 IN | OXYGEN SATURATION: 96 % | SYSTOLIC BLOOD PRESSURE: 141 MMHG | BODY MASS INDEX: 21.47 KG/M2 | DIASTOLIC BLOOD PRESSURE: 74 MMHG | HEART RATE: 90 BPM

## 2019-01-01 VITALS
HEART RATE: 50 BPM | HEIGHT: 69 IN | SYSTOLIC BLOOD PRESSURE: 136 MMHG | OXYGEN SATURATION: 96 % | TEMPERATURE: 97 F | BODY MASS INDEX: 21.62 KG/M2 | DIASTOLIC BLOOD PRESSURE: 71 MMHG | WEIGHT: 146 LBS | RESPIRATION RATE: 20 BRPM

## 2019-01-01 VITALS
HEIGHT: 70 IN | SYSTOLIC BLOOD PRESSURE: 157 MMHG | TEMPERATURE: 98.1 F | OXYGEN SATURATION: 93 % | BODY MASS INDEX: 20.41 KG/M2 | WEIGHT: 142.6 LBS | RESPIRATION RATE: 18 BRPM | HEART RATE: 66 BPM | DIASTOLIC BLOOD PRESSURE: 88 MMHG

## 2019-01-01 DIAGNOSIS — R11.2 INTRACTABLE VOMITING WITH NAUSEA, UNSPECIFIED VOMITING TYPE: Primary | ICD-10-CM

## 2019-01-01 DIAGNOSIS — C61 MALIGNANT NEOPLASM OF PROSTATE (HCC): ICD-10-CM

## 2019-01-01 DIAGNOSIS — C79.51 PROSTATE CANCER METASTATIC TO BONE (HCC): ICD-10-CM

## 2019-01-01 DIAGNOSIS — R91.1 PULMONARY NODULE: ICD-10-CM

## 2019-01-01 DIAGNOSIS — Z97.8 INDWELLING FOLEY CATHETER PRESENT: ICD-10-CM

## 2019-01-01 DIAGNOSIS — R91.1 PULMONARY NODULE: Primary | ICD-10-CM

## 2019-01-01 DIAGNOSIS — I20.0 UNSTABLE ANGINA (HCC): ICD-10-CM

## 2019-01-01 DIAGNOSIS — R91.8 PULMONARY NODULES: ICD-10-CM

## 2019-01-01 DIAGNOSIS — F32.2 SEVERE MAJOR DEPRESSION (HCC): ICD-10-CM

## 2019-01-01 DIAGNOSIS — R91.8 MULTIPLE PULMONARY NODULES: ICD-10-CM

## 2019-01-01 DIAGNOSIS — G91.2 NORMAL PRESSURE HYDROCEPHALUS (HCC): ICD-10-CM

## 2019-01-01 DIAGNOSIS — E87.20 LACTIC ACIDOSIS: ICD-10-CM

## 2019-01-01 DIAGNOSIS — J43.9 PULMONARY EMPHYSEMA, UNSPECIFIED EMPHYSEMA TYPE (HCC): ICD-10-CM

## 2019-01-01 DIAGNOSIS — R11.2 NAUSEA AND VOMITING, INTRACTABILITY OF VOMITING NOT SPECIFIED, UNSPECIFIED VOMITING TYPE: Primary | ICD-10-CM

## 2019-01-01 DIAGNOSIS — D70.9 NEUTROPENIA WITH FEVER (HCC): Primary | ICD-10-CM

## 2019-01-01 DIAGNOSIS — C61 PROSTATE CANCER (HCC): Primary | ICD-10-CM

## 2019-01-01 DIAGNOSIS — R10.9 ABDOMINAL PAIN, UNSPECIFIED ABDOMINAL LOCATION: ICD-10-CM

## 2019-01-01 DIAGNOSIS — C61 PROSTATE CANCER METASTATIC TO BONE (HCC): ICD-10-CM

## 2019-01-01 DIAGNOSIS — D07.5 CA IN SITU PROSTATE: ICD-10-CM

## 2019-01-01 DIAGNOSIS — E78.2 MIXED HYPERLIPIDEMIA: ICD-10-CM

## 2019-01-01 DIAGNOSIS — R94.31 ABNORMAL EKG: ICD-10-CM

## 2019-01-01 DIAGNOSIS — K30 INDIGESTION: Primary | ICD-10-CM

## 2019-01-01 DIAGNOSIS — J44.1 COPD WITH ACUTE EXACERBATION (HCC): Primary | ICD-10-CM

## 2019-01-01 DIAGNOSIS — C60.9 PENILE CANCER (HCC): ICD-10-CM

## 2019-01-01 DIAGNOSIS — L03.311 CELLULITIS OF ABDOMINAL WALL: ICD-10-CM

## 2019-01-01 DIAGNOSIS — R50.9 ACUTE FEBRILE ILLNESS: ICD-10-CM

## 2019-01-01 DIAGNOSIS — N39.0 URINARY TRACT INFECTION ASSOCIATED WITH INDWELLING URETHRAL CATHETER, INITIAL ENCOUNTER (HCC): ICD-10-CM

## 2019-01-01 DIAGNOSIS — T83.511A URINARY TRACT INFECTION ASSOCIATED WITH INDWELLING URETHRAL CATHETER, INITIAL ENCOUNTER (HCC): ICD-10-CM

## 2019-01-01 DIAGNOSIS — J44.1 COPD EXACERBATION (HCC): Primary | ICD-10-CM

## 2019-01-01 DIAGNOSIS — I25.119 CORONARY ARTERY DISEASE INVOLVING NATIVE CORONARY ARTERY OF NATIVE HEART WITH ANGINA PECTORIS (HCC): ICD-10-CM

## 2019-01-01 DIAGNOSIS — N30.00 ACUTE CYSTITIS WITHOUT HEMATURIA: Primary | ICD-10-CM

## 2019-01-01 DIAGNOSIS — C61 HORMONE REFRACTORY PROSTATE CANCER (HCC): ICD-10-CM

## 2019-01-01 DIAGNOSIS — M25.551 RIGHT HIP PAIN: ICD-10-CM

## 2019-01-01 DIAGNOSIS — R53.83 OTHER FATIGUE: ICD-10-CM

## 2019-01-01 DIAGNOSIS — T50.8X5A ALLERGIC REACTION TO CONTRAST DYE, INITIAL ENCOUNTER: Primary | ICD-10-CM

## 2019-01-01 DIAGNOSIS — G45.9 TIA (TRANSIENT ISCHEMIC ATTACK): ICD-10-CM

## 2019-01-01 DIAGNOSIS — D84.9 IMMUNOCOMPROMISED (HCC): ICD-10-CM

## 2019-01-01 DIAGNOSIS — C61 PROSTATE CANCER (HCC): ICD-10-CM

## 2019-01-01 DIAGNOSIS — D07.5 CARCINOMA IN SITU OF PROSTATE: ICD-10-CM

## 2019-01-01 DIAGNOSIS — R35.0 URINARY FREQUENCY: ICD-10-CM

## 2019-01-01 DIAGNOSIS — C79.9 METASTATIC CANCER (HCC): ICD-10-CM

## 2019-01-01 DIAGNOSIS — I10 ESSENTIAL HYPERTENSION: ICD-10-CM

## 2019-01-01 DIAGNOSIS — C79.51 BONE METASTASIS (HCC): ICD-10-CM

## 2019-01-01 DIAGNOSIS — C61 CANCER OF PROSTATE (HCC): ICD-10-CM

## 2019-01-01 DIAGNOSIS — C61 MALIGNANT TUMOR OF PROSTATE (HCC): ICD-10-CM

## 2019-01-01 DIAGNOSIS — R52 INTRACTABLE PAIN: Primary | ICD-10-CM

## 2019-01-01 DIAGNOSIS — C61 PRIMARY PROSTATE CANCER WITH METASTASIS FROM PROSTATE TO OTHER SITE (HCC): ICD-10-CM

## 2019-01-01 DIAGNOSIS — R41.82 ALTERED MENTAL STATUS, UNSPECIFIED ALTERED MENTAL STATUS TYPE: Primary | ICD-10-CM

## 2019-01-01 DIAGNOSIS — D70.1 CHEMOTHERAPY-INDUCED NEUTROPENIA (HCC): ICD-10-CM

## 2019-01-01 DIAGNOSIS — T45.1X5A CHEMOTHERAPY-INDUCED NEUTROPENIA (HCC): ICD-10-CM

## 2019-01-01 DIAGNOSIS — R50.81 NEUTROPENIA WITH FEVER (HCC): Primary | ICD-10-CM

## 2019-01-01 LAB
A/G RATIO: 1.1 (ref 1.1–2.2)
A/G RATIO: 1.3 (ref 1.1–2.2)
A/G RATIO: 1.4 (ref 1.1–2.2)
A/G RATIO: 1.5 (ref 1.1–2.2)
A/G RATIO: 1.5 (ref 1.1–2.2)
A/G RATIO: 1.7 (ref 1.1–2.2)
A/G RATIO: 1.8 (ref 1.1–2.2)
ABO/RH: NORMAL
ABO/RH: NORMAL
ALBUMIN SERPL-MCNC: 2.4 G/DL (ref 3.4–5)
ALBUMIN SERPL-MCNC: 2.5 G/DL (ref 3.4–5)
ALBUMIN SERPL-MCNC: 2.6 G/DL (ref 3.4–5)
ALBUMIN SERPL-MCNC: 3 G/DL (ref 3.4–5)
ALBUMIN SERPL-MCNC: 3.1 G/DL (ref 3.4–5)
ALBUMIN SERPL-MCNC: 3.2 G/DL (ref 3.4–5)
ALBUMIN SERPL-MCNC: 3.6 G/DL (ref 3.4–5)
ALBUMIN SERPL-MCNC: 3.7 G/DL (ref 3.4–5)
ALBUMIN SERPL-MCNC: 3.8 G/DL (ref 3.4–5)
ALBUMIN SERPL-MCNC: 3.9 G/DL (ref 3.4–5)
ALBUMIN SERPL-MCNC: 4 G/DL (ref 3.4–5)
ALBUMIN SERPL-MCNC: 4.1 G/DL (ref 3.4–5)
ALP BLD-CCNC: 101 U/L (ref 40–129)
ALP BLD-CCNC: 114 U/L (ref 40–129)
ALP BLD-CCNC: 128 U/L (ref 40–129)
ALP BLD-CCNC: 131 U/L (ref 40–129)
ALP BLD-CCNC: 155 U/L (ref 40–129)
ALP BLD-CCNC: 155 U/L (ref 40–129)
ALP BLD-CCNC: 71 U/L (ref 40–129)
ALP BLD-CCNC: 71 U/L (ref 40–129)
ALP BLD-CCNC: 80 U/L (ref 40–129)
ALT SERPL-CCNC: 13 U/L (ref 10–40)
ALT SERPL-CCNC: 13 U/L (ref 10–40)
ALT SERPL-CCNC: 15 U/L (ref 10–40)
ALT SERPL-CCNC: 15 U/L (ref 10–40)
ALT SERPL-CCNC: 16 U/L (ref 10–40)
ALT SERPL-CCNC: 18 U/L (ref 10–40)
ALT SERPL-CCNC: 20 U/L (ref 10–40)
ALT SERPL-CCNC: 28 U/L (ref 10–40)
ALT SERPL-CCNC: 42 U/L (ref 10–40)
ANION GAP SERPL CALCULATED.3IONS-SCNC: 10 MMOL/L (ref 3–16)
ANION GAP SERPL CALCULATED.3IONS-SCNC: 11 MMOL/L (ref 3–16)
ANION GAP SERPL CALCULATED.3IONS-SCNC: 12 MMOL/L (ref 3–16)
ANION GAP SERPL CALCULATED.3IONS-SCNC: 13 MMOL/L (ref 3–16)
ANION GAP SERPL CALCULATED.3IONS-SCNC: 14 MMOL/L (ref 3–16)
ANION GAP SERPL CALCULATED.3IONS-SCNC: 15 MMOL/L (ref 3–16)
ANION GAP SERPL CALCULATED.3IONS-SCNC: 7 MMOL/L (ref 3–16)
ANION GAP SERPL CALCULATED.3IONS-SCNC: 9 MMOL/L (ref 3–16)
ANISOCYTOSIS: ABNORMAL
ANTIBODY SCREEN: NORMAL
ANTIBODY SCREEN: NORMAL
APTT: 29.8 SEC (ref 26–36)
APTT: 30.1 SEC (ref 26–36)
APTT: 30.9 SEC (ref 26–36)
APTT: 31.8 SEC (ref 26–36)
APTT: 33.4 SEC (ref 26–36)
AST SERPL-CCNC: 15 U/L (ref 15–37)
AST SERPL-CCNC: 15 U/L (ref 15–37)
AST SERPL-CCNC: 16 U/L (ref 15–37)
AST SERPL-CCNC: 17 U/L (ref 15–37)
AST SERPL-CCNC: 17 U/L (ref 15–37)
AST SERPL-CCNC: 21 U/L (ref 15–37)
AST SERPL-CCNC: 22 U/L (ref 15–37)
AST SERPL-CCNC: 23 U/L (ref 15–37)
AST SERPL-CCNC: 27 U/L (ref 15–37)
ATYPICAL LYMPHOCYTE RELATIVE PERCENT: 1 % (ref 0–6)
ATYPICAL LYMPHOCYTE RELATIVE PERCENT: 4 % (ref 0–6)
BACTERIA: ABNORMAL /HPF
BANDED NEUTROPHILS RELATIVE PERCENT: 12 % (ref 0–7)
BANDED NEUTROPHILS RELATIVE PERCENT: 17 % (ref 0–7)
BANDED NEUTROPHILS RELATIVE PERCENT: 2 % (ref 0–7)
BANDED NEUTROPHILS RELATIVE PERCENT: 29 % (ref 0–7)
BANDED NEUTROPHILS RELATIVE PERCENT: 9 % (ref 0–7)
BASE EXCESS ARTERIAL: -4.4 MMOL/L (ref -3–3)
BASE EXCESS VENOUS: 1 MMOL/L (ref -3–3)
BASOPHILS ABSOLUTE: 0 K/UL (ref 0–0.2)
BASOPHILS ABSOLUTE: 0.1 K/UL (ref 0–0.2)
BASOPHILS RELATIVE PERCENT: 0 %
BASOPHILS RELATIVE PERCENT: 0.2 %
BASOPHILS RELATIVE PERCENT: 0.5 %
BASOPHILS RELATIVE PERCENT: 0.6 %
BASOPHILS RELATIVE PERCENT: 0.8 %
BASOPHILS RELATIVE PERCENT: 0.9 %
BASOPHILS RELATIVE PERCENT: 1 %
BASOPHILS RELATIVE PERCENT: 1 %
BASOPHILS RELATIVE PERCENT: 2 %
BASOPHILS RELATIVE PERCENT: 2 %
BILIRUB SERPL-MCNC: 0.3 MG/DL (ref 0–1)
BILIRUB SERPL-MCNC: 0.4 MG/DL (ref 0–1)
BILIRUB SERPL-MCNC: 0.5 MG/DL (ref 0–1)
BILIRUB SERPL-MCNC: <0.2 MG/DL (ref 0–1)
BILIRUBIN URINE: ABNORMAL
BILIRUBIN URINE: NEGATIVE
BLASTS RELATIVE PERCENT: 1 %
BLASTS RELATIVE PERCENT: 2 %
BLOOD BANK DISPENSE STATUS: NORMAL
BLOOD BANK PRODUCT CODE: NORMAL
BLOOD CULTURE, ROUTINE: NORMAL
BLOOD, URINE: ABNORMAL
BLOOD, URINE: NEGATIVE
BPU ID: NORMAL
BUN BLDV-MCNC: 10 MG/DL (ref 7–20)
BUN BLDV-MCNC: 11 MG/DL (ref 7–20)
BUN BLDV-MCNC: 12 MG/DL (ref 7–20)
BUN BLDV-MCNC: 13 MG/DL (ref 7–20)
BUN BLDV-MCNC: 13 MG/DL (ref 7–20)
BUN BLDV-MCNC: 14 MG/DL (ref 7–20)
BUN BLDV-MCNC: 16 MG/DL (ref 7–20)
BUN BLDV-MCNC: 19 MG/DL (ref 7–20)
BUN BLDV-MCNC: 3 MG/DL (ref 7–20)
BUN BLDV-MCNC: 4 MG/DL (ref 7–20)
BUN BLDV-MCNC: 5 MG/DL (ref 7–20)
BUN BLDV-MCNC: 6 MG/DL (ref 7–20)
BUN BLDV-MCNC: 7 MG/DL (ref 7–20)
BUN BLDV-MCNC: 8 MG/DL (ref 7–20)
BUN BLDV-MCNC: 9 MG/DL (ref 7–20)
BUN BLDV-MCNC: 9 MG/DL (ref 7–20)
CALCIUM SERPL-MCNC: 6.2 MG/DL (ref 8.3–10.6)
CALCIUM SERPL-MCNC: 6.4 MG/DL (ref 8.3–10.6)
CALCIUM SERPL-MCNC: 6.7 MG/DL (ref 8.3–10.6)
CALCIUM SERPL-MCNC: 6.8 MG/DL (ref 8.3–10.6)
CALCIUM SERPL-MCNC: 6.8 MG/DL (ref 8.3–10.6)
CALCIUM SERPL-MCNC: 7 MG/DL (ref 8.3–10.6)
CALCIUM SERPL-MCNC: 7 MG/DL (ref 8.3–10.6)
CALCIUM SERPL-MCNC: 7.2 MG/DL (ref 8.3–10.6)
CALCIUM SERPL-MCNC: 7.4 MG/DL (ref 8.3–10.6)
CALCIUM SERPL-MCNC: 7.5 MG/DL (ref 8.3–10.6)
CALCIUM SERPL-MCNC: 7.6 MG/DL (ref 8.3–10.6)
CALCIUM SERPL-MCNC: 7.6 MG/DL (ref 8.3–10.6)
CALCIUM SERPL-MCNC: 7.8 MG/DL (ref 8.3–10.6)
CALCIUM SERPL-MCNC: 7.9 MG/DL (ref 8.3–10.6)
CALCIUM SERPL-MCNC: 8 MG/DL (ref 8.3–10.6)
CALCIUM SERPL-MCNC: 8 MG/DL (ref 8.3–10.6)
CALCIUM SERPL-MCNC: 8.1 MG/DL (ref 8.3–10.6)
CALCIUM SERPL-MCNC: 8.2 MG/DL (ref 8.3–10.6)
CALCIUM SERPL-MCNC: 8.2 MG/DL (ref 8.3–10.6)
CALCIUM SERPL-MCNC: 8.3 MG/DL (ref 8.3–10.6)
CALCIUM SERPL-MCNC: 8.4 MG/DL (ref 8.3–10.6)
CALCIUM SERPL-MCNC: 8.7 MG/DL (ref 8.3–10.6)
CALCIUM SERPL-MCNC: 8.9 MG/DL (ref 8.3–10.6)
CALCIUM SERPL-MCNC: 8.9 MG/DL (ref 8.3–10.6)
CALCIUM SERPL-MCNC: 9.1 MG/DL (ref 8.3–10.6)
CALCIUM SERPL-MCNC: 9.2 MG/DL (ref 8.3–10.6)
CALCIUM SERPL-MCNC: 9.3 MG/DL (ref 8.3–10.6)
CALCIUM SERPL-MCNC: 9.4 MG/DL (ref 8.3–10.6)
CALCIUM SERPL-MCNC: 9.4 MG/DL (ref 8.3–10.6)
CALCIUM SERPL-MCNC: 9.5 MG/DL (ref 8.3–10.6)
CARBOXYHEMOGLOBIN ARTERIAL: 1.2 % (ref 0–1.5)
CARBOXYHEMOGLOBIN: 2.3 % (ref 0–1.5)
CASTS: ABNORMAL /LPF
CHLORIDE BLD-SCNC: 100 MMOL/L (ref 99–110)
CHLORIDE BLD-SCNC: 101 MMOL/L (ref 99–110)
CHLORIDE BLD-SCNC: 101 MMOL/L (ref 99–110)
CHLORIDE BLD-SCNC: 102 MMOL/L (ref 99–110)
CHLORIDE BLD-SCNC: 103 MMOL/L (ref 99–110)
CHLORIDE BLD-SCNC: 104 MMOL/L (ref 99–110)
CHLORIDE BLD-SCNC: 105 MMOL/L (ref 99–110)
CHLORIDE BLD-SCNC: 106 MMOL/L (ref 99–110)
CHLORIDE BLD-SCNC: 107 MMOL/L (ref 99–110)
CHLORIDE BLD-SCNC: 108 MMOL/L (ref 99–110)
CHLORIDE BLD-SCNC: 97 MMOL/L (ref 99–110)
CHLORIDE BLD-SCNC: 98 MMOL/L (ref 99–110)
CHLORIDE BLD-SCNC: 99 MMOL/L (ref 99–110)
CHLORIDE BLD-SCNC: 99 MMOL/L (ref 99–110)
CHOLESTEROL, TOTAL: 157 MG/DL (ref 0–199)
CLARITY: ABNORMAL
CLARITY: CLEAR
CO2: 19 MMOL/L (ref 21–32)
CO2: 19 MMOL/L (ref 21–32)
CO2: 20 MMOL/L (ref 21–32)
CO2: 21 MMOL/L (ref 21–32)
CO2: 22 MMOL/L (ref 21–32)
CO2: 23 MMOL/L (ref 21–32)
CO2: 24 MMOL/L (ref 21–32)
CO2: 25 MMOL/L (ref 21–32)
CO2: 26 MMOL/L (ref 21–32)
CO2: 26 MMOL/L (ref 21–32)
CO2: 28 MMOL/L (ref 21–32)
COLOR: ABNORMAL
COLOR: YELLOW
CREAT SERPL-MCNC: 0.5 MG/DL (ref 0.8–1.3)
CREAT SERPL-MCNC: 0.6 MG/DL (ref 0.8–1.3)
CREAT SERPL-MCNC: 0.7 MG/DL (ref 0.8–1.3)
CREAT SERPL-MCNC: 0.8 MG/DL (ref 0.8–1.3)
CREAT SERPL-MCNC: <0.5 MG/DL (ref 0.8–1.3)
CREATININE URINE: 173.4 MG/DL (ref 39–259)
CRYPTOSPORIDIUM ANTIGEN STOOL: NORMAL
CRYSTALS, UA: ABNORMAL /HPF
CULTURE, BLOOD 2: NORMAL
DESCRIPTION BLOOD BANK: NORMAL
DOHLE BODIES: PRESENT
E HISTOLYTICA ANTIGEN STOOL: NORMAL
EKG ATRIAL RATE: 109 BPM
EKG ATRIAL RATE: 111 BPM
EKG ATRIAL RATE: 81 BPM
EKG ATRIAL RATE: 82 BPM
EKG ATRIAL RATE: 87 BPM
EKG DIAGNOSIS: NORMAL
EKG P AXIS: 29 DEGREES
EKG P AXIS: 29 DEGREES
EKG P AXIS: 40 DEGREES
EKG P AXIS: 41 DEGREES
EKG P AXIS: 6 DEGREES
EKG P-R INTERVAL: 116 MS
EKG P-R INTERVAL: 134 MS
EKG P-R INTERVAL: 144 MS
EKG P-R INTERVAL: 144 MS
EKG P-R INTERVAL: 152 MS
EKG Q-T INTERVAL: 298 MS
EKG Q-T INTERVAL: 306 MS
EKG Q-T INTERVAL: 402 MS
EKG Q-T INTERVAL: 404 MS
EKG Q-T INTERVAL: 424 MS
EKG QRS DURATION: 68 MS
EKG QRS DURATION: 80 MS
EKG QRS DURATION: 90 MS
EKG QRS DURATION: 92 MS
EKG QRS DURATION: 94 MS
EKG QTC CALCULATION (BAZETT): 401 MS
EKG QTC CALCULATION (BAZETT): 416 MS
EKG QTC CALCULATION (BAZETT): 466 MS
EKG QTC CALCULATION (BAZETT): 486 MS
EKG QTC CALCULATION (BAZETT): 495 MS
EKG R AXIS: 11 DEGREES
EKG R AXIS: 13 DEGREES
EKG R AXIS: 5 DEGREES
EKG R AXIS: 54 DEGREES
EKG R AXIS: 6 DEGREES
EKG T AXIS: -65 DEGREES
EKG T AXIS: -69 DEGREES
EKG T AXIS: 14 DEGREES
EKG T AXIS: 19 DEGREES
EKG T AXIS: 31 DEGREES
EKG VENTRICULAR RATE: 109 BPM
EKG VENTRICULAR RATE: 111 BPM
EKG VENTRICULAR RATE: 81 BPM
EKG VENTRICULAR RATE: 82 BPM
EKG VENTRICULAR RATE: 87 BPM
EOSINOPHILS ABSOLUTE: 0 K/UL (ref 0–0.6)
EOSINOPHILS ABSOLUTE: 0.1 K/UL (ref 0–0.6)
EOSINOPHILS ABSOLUTE: 0.2 K/UL (ref 0–0.6)
EOSINOPHILS ABSOLUTE: 0.6 K/UL (ref 0–0.6)
EOSINOPHILS RELATIVE PERCENT: 0 %
EOSINOPHILS RELATIVE PERCENT: 0.2 %
EOSINOPHILS RELATIVE PERCENT: 0.6 %
EOSINOPHILS RELATIVE PERCENT: 0.8 %
EOSINOPHILS RELATIVE PERCENT: 1 %
EOSINOPHILS RELATIVE PERCENT: 1 %
EOSINOPHILS RELATIVE PERCENT: 1.4 %
EOSINOPHILS RELATIVE PERCENT: 11 %
EOSINOPHILS RELATIVE PERCENT: 2.7 %
EOSINOPHILS RELATIVE PERCENT: 3 %
EOSINOPHILS RELATIVE PERCENT: 3.7 %
EOSINOPHILS RELATIVE PERCENT: 4.2 %
EOSINOPHILS RELATIVE PERCENT: 5.9 %
EOSINOPHILS RELATIVE PERCENT: 6 %
EOSINOPHILS RELATIVE PERCENT: 6 %
EPITHELIAL CELLS, UA: 1 /HPF (ref 0–5)
EPITHELIAL CELLS, UA: 1 /HPF (ref 0–5)
EPITHELIAL CELLS, UA: 2 /HPF (ref 0–5)
EPITHELIAL CELLS, UA: 2 /HPF (ref 0–5)
EPITHELIAL CELLS, UA: 7 /HPF (ref 0–5)
ESTIMATED AVERAGE GLUCOSE: 131.2 MG/DL
FERRITIN: 1269 NG/ML (ref 30–400)
GFR AFRICAN AMERICAN: >60
GFR NON-AFRICAN AMERICAN: >60
GI BACTERIAL PATHOGENS BY PCR: NORMAL
GIARDIA ANTIGEN STOOL: NORMAL
GLOBULIN: 2.1 G/DL
GLOBULIN: 2.4 G/DL
GLOBULIN: 2.5 G/DL
GLOBULIN: 2.6 G/DL
GLOBULIN: 2.7 G/DL
GLOBULIN: 2.7 G/DL
GLOBULIN: 2.8 G/DL
GLOBULIN: 2.9 G/DL
GLOBULIN: 2.9 G/DL
GLUCOSE BLD-MCNC: 101 MG/DL (ref 70–99)
GLUCOSE BLD-MCNC: 102 MG/DL (ref 70–99)
GLUCOSE BLD-MCNC: 102 MG/DL (ref 70–99)
GLUCOSE BLD-MCNC: 103 MG/DL (ref 70–99)
GLUCOSE BLD-MCNC: 104 MG/DL (ref 70–99)
GLUCOSE BLD-MCNC: 105 MG/DL (ref 70–99)
GLUCOSE BLD-MCNC: 106 MG/DL (ref 70–99)
GLUCOSE BLD-MCNC: 107 MG/DL (ref 70–99)
GLUCOSE BLD-MCNC: 107 MG/DL (ref 70–99)
GLUCOSE BLD-MCNC: 110 MG/DL (ref 70–99)
GLUCOSE BLD-MCNC: 110 MG/DL (ref 70–99)
GLUCOSE BLD-MCNC: 119 MG/DL (ref 70–99)
GLUCOSE BLD-MCNC: 121 MG/DL (ref 70–99)
GLUCOSE BLD-MCNC: 125 MG/DL (ref 70–99)
GLUCOSE BLD-MCNC: 132 MG/DL (ref 70–99)
GLUCOSE BLD-MCNC: 154 MG/DL (ref 70–99)
GLUCOSE BLD-MCNC: 168 MG/DL (ref 70–99)
GLUCOSE BLD-MCNC: 79 MG/DL (ref 70–99)
GLUCOSE BLD-MCNC: 81 MG/DL (ref 70–99)
GLUCOSE BLD-MCNC: 82 MG/DL (ref 70–99)
GLUCOSE BLD-MCNC: 84 MG/DL (ref 70–99)
GLUCOSE BLD-MCNC: 84 MG/DL (ref 70–99)
GLUCOSE BLD-MCNC: 85 MG/DL (ref 70–99)
GLUCOSE BLD-MCNC: 85 MG/DL (ref 70–99)
GLUCOSE BLD-MCNC: 87 MG/DL (ref 70–99)
GLUCOSE BLD-MCNC: 87 MG/DL (ref 70–99)
GLUCOSE BLD-MCNC: 89 MG/DL (ref 70–99)
GLUCOSE BLD-MCNC: 91 MG/DL (ref 70–99)
GLUCOSE BLD-MCNC: 93 MG/DL (ref 70–99)
GLUCOSE BLD-MCNC: 94 MG/DL (ref 70–99)
GLUCOSE BLD-MCNC: 95 MG/DL (ref 70–99)
GLUCOSE BLD-MCNC: 98 MG/DL (ref 70–99)
GLUCOSE URINE: 100 MG/DL
GLUCOSE URINE: NEGATIVE MG/DL
HBA1C MFR BLD: 6.2 %
HCO3 ARTERIAL: 20.3 MMOL/L (ref 21–29)
HCO3 VENOUS: 27.6 MMOL/L (ref 23–29)
HCT VFR BLD CALC: 21.4 % (ref 40.5–52.5)
HCT VFR BLD CALC: 21.8 % (ref 40.5–52.5)
HCT VFR BLD CALC: 23 % (ref 40.5–52.5)
HCT VFR BLD CALC: 23.1 % (ref 40.5–52.5)
HCT VFR BLD CALC: 23.7 % (ref 40.5–52.5)
HCT VFR BLD CALC: 23.9 % (ref 40.5–52.5)
HCT VFR BLD CALC: 24.1 % (ref 40.5–52.5)
HCT VFR BLD CALC: 24.8 % (ref 40.5–52.5)
HCT VFR BLD CALC: 24.8 % (ref 40.5–52.5)
HCT VFR BLD CALC: 25.4 % (ref 40.5–52.5)
HCT VFR BLD CALC: 25.6 % (ref 40.5–52.5)
HCT VFR BLD CALC: 25.6 % (ref 40.5–52.5)
HCT VFR BLD CALC: 25.9 % (ref 40.5–52.5)
HCT VFR BLD CALC: 26.1 % (ref 40.5–52.5)
HCT VFR BLD CALC: 26.3 % (ref 40.5–52.5)
HCT VFR BLD CALC: 26.4 % (ref 40.5–52.5)
HCT VFR BLD CALC: 26.5 % (ref 40.5–52.5)
HCT VFR BLD CALC: 26.7 % (ref 40.5–52.5)
HCT VFR BLD CALC: 26.7 % (ref 40.5–52.5)
HCT VFR BLD CALC: 26.8 % (ref 40.5–52.5)
HCT VFR BLD CALC: 27.1 % (ref 40.5–52.5)
HCT VFR BLD CALC: 27.2 % (ref 40.5–52.5)
HCT VFR BLD CALC: 27.6 % (ref 40.5–52.5)
HCT VFR BLD CALC: 27.6 % (ref 40.5–52.5)
HCT VFR BLD CALC: 27.7 % (ref 40.5–52.5)
HCT VFR BLD CALC: 28.4 % (ref 40.5–52.5)
HCT VFR BLD CALC: 28.7 % (ref 40.5–52.5)
HCT VFR BLD CALC: 29.4 % (ref 40.5–52.5)
HCT VFR BLD CALC: 29.6 % (ref 40.5–52.5)
HCT VFR BLD CALC: 29.9 % (ref 40.5–52.5)
HCT VFR BLD CALC: 30.3 % (ref 40.5–52.5)
HCT VFR BLD CALC: 31.6 % (ref 40.5–52.5)
HCT VFR BLD CALC: 31.7 % (ref 40.5–52.5)
HCT VFR BLD CALC: 32 % (ref 40.5–52.5)
HCT VFR BLD CALC: 32.7 % (ref 40.5–52.5)
HCT VFR BLD CALC: 33.3 % (ref 40.5–52.5)
HDLC SERPL-MCNC: 31 MG/DL (ref 40–60)
HEMATOLOGY PATH CONSULT: NO
HEMATOLOGY PATH CONSULT: NO
HEMATOLOGY PATH CONSULT: NORMAL
HEMATOLOGY PATH CONSULT: YES
HEMOGLOBIN, ART, EXTENDED: 7.7 G/DL (ref 13.5–17.5)
HEMOGLOBIN, VEN, REDUCED: 8 %
HEMOGLOBIN: 10 G/DL (ref 13.5–17.5)
HEMOGLOBIN: 10.1 G/DL (ref 13.5–17.5)
HEMOGLOBIN: 10.2 G/DL (ref 13.5–17.5)
HEMOGLOBIN: 10.2 G/DL (ref 13.5–17.5)
HEMOGLOBIN: 10.7 G/DL (ref 13.5–17.5)
HEMOGLOBIN: 10.8 G/DL (ref 13.5–17.5)
HEMOGLOBIN: 10.8 G/DL (ref 13.5–17.5)
HEMOGLOBIN: 11.1 G/DL (ref 13.5–17.5)
HEMOGLOBIN: 7.2 G/DL (ref 13.5–17.5)
HEMOGLOBIN: 7.4 G/DL (ref 13.5–17.5)
HEMOGLOBIN: 7.5 G/DL (ref 13.5–17.5)
HEMOGLOBIN: 7.8 G/DL (ref 13.5–17.5)
HEMOGLOBIN: 8.2 G/DL (ref 13.5–17.5)
HEMOGLOBIN: 8.3 G/DL (ref 13.5–17.5)
HEMOGLOBIN: 8.4 G/DL (ref 13.5–17.5)
HEMOGLOBIN: 8.6 G/DL (ref 13.5–17.5)
HEMOGLOBIN: 8.6 G/DL (ref 13.5–17.5)
HEMOGLOBIN: 8.7 G/DL (ref 13.5–17.5)
HEMOGLOBIN: 8.8 G/DL (ref 13.5–17.5)
HEMOGLOBIN: 8.8 G/DL (ref 13.5–17.5)
HEMOGLOBIN: 8.9 G/DL (ref 13.5–17.5)
HEMOGLOBIN: 8.9 G/DL (ref 13.5–17.5)
HEMOGLOBIN: 9 G/DL (ref 13.5–17.5)
HEMOGLOBIN: 9.1 G/DL (ref 13.5–17.5)
HEMOGLOBIN: 9.3 G/DL (ref 13.5–17.5)
HEMOGLOBIN: 9.7 G/DL (ref 13.5–17.5)
HEMOGLOBIN: 9.8 G/DL (ref 13.5–17.5)
HYALINE CASTS: 0 /LPF (ref 0–8)
HYALINE CASTS: 8 /LPF (ref 0–8)
HYALINE CASTS: 9 /LPF (ref 0–8)
IMMATURE RETIC FRACT: 0.6 (ref 0.21–0.37)
INR BLD: 0.87 (ref 0.86–1.14)
INR BLD: 0.89 (ref 0.86–1.14)
INR BLD: 0.93 (ref 0.86–1.14)
INR BLD: 0.97 (ref 0.86–1.14)
INR BLD: 1.09 (ref 0.86–1.14)
INR BLD: 1.3 (ref 0.86–1.14)
INR BLD: 1.5 (ref 0.86–1.14)
IRON SATURATION: 17 % (ref 20–50)
IRON: 26 UG/DL (ref 59–158)
KETONES, URINE: 15 MG/DL
KETONES, URINE: 40 MG/DL
KETONES, URINE: 40 MG/DL
KETONES, URINE: ABNORMAL MG/DL
KETONES, URINE: ABNORMAL MG/DL
KETONES, URINE: NEGATIVE MG/DL
KETONES, URINE: NEGATIVE MG/DL
LACTIC ACID, SEPSIS: 0.6 MMOL/L (ref 0.4–1.9)
LACTIC ACID, SEPSIS: 0.8 MMOL/L (ref 0.4–1.9)
LACTIC ACID, SEPSIS: 1.1 MMOL/L (ref 0.4–1.9)
LACTIC ACID: 1.5 MMOL/L (ref 0.4–2)
LACTIC ACID: 1.7 MMOL/L (ref 0.4–2)
LACTIC ACID: 2.1 MMOL/L (ref 0.4–2)
LACTIC ACID: 2.3 MMOL/L (ref 0.4–2)
LDL CHOLESTEROL CALCULATED: 83 MG/DL
LEUKOCYTE ESTERASE, URINE: ABNORMAL
LEUKOCYTE ESTERASE, URINE: NEGATIVE
LIPASE: 10 U/L (ref 13–60)
LIPASE: 9 U/L (ref 13–60)
LV EF: 55 %
LVEF MODALITY: NORMAL
LYMPHOCYTES ABSOLUTE: 0.2 K/UL (ref 1–5.1)
LYMPHOCYTES ABSOLUTE: 0.2 K/UL (ref 1–5.1)
LYMPHOCYTES ABSOLUTE: 0.3 K/UL (ref 1–5.1)
LYMPHOCYTES ABSOLUTE: 0.3 K/UL (ref 1–5.1)
LYMPHOCYTES ABSOLUTE: 0.4 K/UL (ref 1–5.1)
LYMPHOCYTES ABSOLUTE: 0.5 K/UL (ref 1–5.1)
LYMPHOCYTES ABSOLUTE: 0.8 K/UL (ref 1–5.1)
LYMPHOCYTES ABSOLUTE: 1 K/UL (ref 1–5.1)
LYMPHOCYTES ABSOLUTE: 1.1 K/UL (ref 1–5.1)
LYMPHOCYTES ABSOLUTE: 1.4 K/UL (ref 1–5.1)
LYMPHOCYTES ABSOLUTE: 1.5 K/UL (ref 1–5.1)
LYMPHOCYTES RELATIVE PERCENT: 10.7 %
LYMPHOCYTES RELATIVE PERCENT: 12 %
LYMPHOCYTES RELATIVE PERCENT: 13.6 %
LYMPHOCYTES RELATIVE PERCENT: 16 %
LYMPHOCYTES RELATIVE PERCENT: 16.1 %
LYMPHOCYTES RELATIVE PERCENT: 18 %
LYMPHOCYTES RELATIVE PERCENT: 25.4 %
LYMPHOCYTES RELATIVE PERCENT: 3 %
LYMPHOCYTES RELATIVE PERCENT: 36 %
LYMPHOCYTES RELATIVE PERCENT: 4 %
LYMPHOCYTES RELATIVE PERCENT: 41.5 %
LYMPHOCYTES RELATIVE PERCENT: 5.4 %
LYMPHOCYTES RELATIVE PERCENT: 7 %
LYMPHOCYTES RELATIVE PERCENT: 7 %
LYMPHOCYTES RELATIVE PERCENT: 7.9 %
LYMPHOCYTES RELATIVE PERCENT: 9 %
LYMPHOCYTES RELATIVE PERCENT: 9.4 %
MACROCYTES: ABNORMAL
MAGNESIUM: 2 MG/DL (ref 1.8–2.4)
MAGNESIUM: 2.1 MG/DL (ref 1.8–2.4)
MAGNESIUM: 2.2 MG/DL (ref 1.8–2.4)
MAGNESIUM: 2.3 MG/DL (ref 1.8–2.4)
MAGNESIUM: 2.3 MG/DL (ref 1.8–2.4)
MCH RBC QN AUTO: 30.9 PG (ref 26–34)
MCH RBC QN AUTO: 31.1 PG (ref 26–34)
MCH RBC QN AUTO: 31.1 PG (ref 26–34)
MCH RBC QN AUTO: 31.2 PG (ref 26–34)
MCH RBC QN AUTO: 31.3 PG (ref 26–34)
MCH RBC QN AUTO: 31.4 PG (ref 26–34)
MCH RBC QN AUTO: 31.5 PG (ref 26–34)
MCH RBC QN AUTO: 31.5 PG (ref 26–34)
MCH RBC QN AUTO: 31.6 PG (ref 26–34)
MCH RBC QN AUTO: 31.6 PG (ref 26–34)
MCH RBC QN AUTO: 31.8 PG (ref 26–34)
MCH RBC QN AUTO: 31.9 PG (ref 26–34)
MCH RBC QN AUTO: 32 PG (ref 26–34)
MCH RBC QN AUTO: 32.1 PG (ref 26–34)
MCH RBC QN AUTO: 32.1 PG (ref 26–34)
MCH RBC QN AUTO: 32.2 PG (ref 26–34)
MCH RBC QN AUTO: 32.3 PG (ref 26–34)
MCH RBC QN AUTO: 32.4 PG (ref 26–34)
MCH RBC QN AUTO: 32.6 PG (ref 26–34)
MCH RBC QN AUTO: 32.8 PG (ref 26–34)
MCH RBC QN AUTO: 33 PG (ref 26–34)
MCH RBC QN AUTO: 33.2 PG (ref 26–34)
MCH RBC QN AUTO: 33.3 PG (ref 26–34)
MCH RBC QN AUTO: 33.3 PG (ref 26–34)
MCH RBC QN AUTO: 33.8 PG (ref 26–34)
MCH RBC QN AUTO: 33.9 PG (ref 26–34)
MCH RBC QN AUTO: 33.9 PG (ref 26–34)
MCHC RBC AUTO-ENTMCNC: 32.2 G/DL (ref 31–36)
MCHC RBC AUTO-ENTMCNC: 32.3 G/DL (ref 31–36)
MCHC RBC AUTO-ENTMCNC: 32.3 G/DL (ref 31–36)
MCHC RBC AUTO-ENTMCNC: 32.4 G/DL (ref 31–36)
MCHC RBC AUTO-ENTMCNC: 32.5 G/DL (ref 31–36)
MCHC RBC AUTO-ENTMCNC: 32.6 G/DL (ref 31–36)
MCHC RBC AUTO-ENTMCNC: 32.9 G/DL (ref 31–36)
MCHC RBC AUTO-ENTMCNC: 33.1 G/DL (ref 31–36)
MCHC RBC AUTO-ENTMCNC: 33.2 G/DL (ref 31–36)
MCHC RBC AUTO-ENTMCNC: 33.3 G/DL (ref 31–36)
MCHC RBC AUTO-ENTMCNC: 33.4 G/DL (ref 31–36)
MCHC RBC AUTO-ENTMCNC: 33.5 G/DL (ref 31–36)
MCHC RBC AUTO-ENTMCNC: 33.6 G/DL (ref 31–36)
MCHC RBC AUTO-ENTMCNC: 33.7 G/DL (ref 31–36)
MCHC RBC AUTO-ENTMCNC: 33.7 G/DL (ref 31–36)
MCHC RBC AUTO-ENTMCNC: 33.8 G/DL (ref 31–36)
MCHC RBC AUTO-ENTMCNC: 33.9 G/DL (ref 31–36)
MCHC RBC AUTO-ENTMCNC: 34.1 G/DL (ref 31–36)
MCHC RBC AUTO-ENTMCNC: 34.2 G/DL (ref 31–36)
MCHC RBC AUTO-ENTMCNC: 34.4 G/DL (ref 31–36)
MCHC RBC AUTO-ENTMCNC: 34.7 G/DL (ref 31–36)
MCV RBC AUTO: 100.4 FL (ref 80–100)
MCV RBC AUTO: 100.5 FL (ref 80–100)
MCV RBC AUTO: 100.6 FL (ref 80–100)
MCV RBC AUTO: 92.6 FL (ref 80–100)
MCV RBC AUTO: 92.7 FL (ref 80–100)
MCV RBC AUTO: 92.8 FL (ref 80–100)
MCV RBC AUTO: 92.8 FL (ref 80–100)
MCV RBC AUTO: 93 FL (ref 80–100)
MCV RBC AUTO: 93.1 FL (ref 80–100)
MCV RBC AUTO: 93.1 FL (ref 80–100)
MCV RBC AUTO: 93.2 FL (ref 80–100)
MCV RBC AUTO: 93.2 FL (ref 80–100)
MCV RBC AUTO: 93.6 FL (ref 80–100)
MCV RBC AUTO: 94 FL (ref 80–100)
MCV RBC AUTO: 94.1 FL (ref 80–100)
MCV RBC AUTO: 94.1 FL (ref 80–100)
MCV RBC AUTO: 94.2 FL (ref 80–100)
MCV RBC AUTO: 94.2 FL (ref 80–100)
MCV RBC AUTO: 94.4 FL (ref 80–100)
MCV RBC AUTO: 94.9 FL (ref 80–100)
MCV RBC AUTO: 95.1 FL (ref 80–100)
MCV RBC AUTO: 95.3 FL (ref 80–100)
MCV RBC AUTO: 95.5 FL (ref 80–100)
MCV RBC AUTO: 95.7 FL (ref 80–100)
MCV RBC AUTO: 95.8 FL (ref 80–100)
MCV RBC AUTO: 96.1 FL (ref 80–100)
MCV RBC AUTO: 96.4 FL (ref 80–100)
MCV RBC AUTO: 96.8 FL (ref 80–100)
MCV RBC AUTO: 97.4 FL (ref 80–100)
MCV RBC AUTO: 98.7 FL (ref 80–100)
MCV RBC AUTO: 99.2 FL (ref 80–100)
MCV RBC AUTO: 99.4 FL (ref 80–100)
MCV RBC AUTO: 99.6 FL (ref 80–100)
METAMYELOCYTES RELATIVE PERCENT: 2 %
METAMYELOCYTES RELATIVE PERCENT: 5 %
METAMYELOCYTES RELATIVE PERCENT: 6 %
METHEMOGLOBIN ARTERIAL: 0.4 %
METHEMOGLOBIN VENOUS: 0.4 %
MICROCYTES: ABNORMAL
MICROSCOPIC EXAMINATION: ABNORMAL
MICROSCOPIC EXAMINATION: NORMAL
MICROSCOPIC EXAMINATION: YES
MONOCYTES ABSOLUTE: 0.1 K/UL (ref 0–1.3)
MONOCYTES ABSOLUTE: 0.2 K/UL (ref 0–1.3)
MONOCYTES ABSOLUTE: 0.3 K/UL (ref 0–1.3)
MONOCYTES ABSOLUTE: 0.3 K/UL (ref 0–1.3)
MONOCYTES ABSOLUTE: 0.4 K/UL (ref 0–1.3)
MONOCYTES ABSOLUTE: 0.5 K/UL (ref 0–1.3)
MONOCYTES ABSOLUTE: 0.6 K/UL (ref 0–1.3)
MONOCYTES ABSOLUTE: 0.8 K/UL (ref 0–1.3)
MONOCYTES ABSOLUTE: 1.7 K/UL (ref 0–1.3)
MONOCYTES RELATIVE PERCENT: 10.1 %
MONOCYTES RELATIVE PERCENT: 10.4 %
MONOCYTES RELATIVE PERCENT: 11 %
MONOCYTES RELATIVE PERCENT: 12 %
MONOCYTES RELATIVE PERCENT: 12.8 %
MONOCYTES RELATIVE PERCENT: 2 %
MONOCYTES RELATIVE PERCENT: 20.8 %
MONOCYTES RELATIVE PERCENT: 24 %
MONOCYTES RELATIVE PERCENT: 3 %
MONOCYTES RELATIVE PERCENT: 38 %
MONOCYTES RELATIVE PERCENT: 4 %
MONOCYTES RELATIVE PERCENT: 5.1 %
MONOCYTES RELATIVE PERCENT: 5.7 %
MONOCYTES RELATIVE PERCENT: 6 %
MONOCYTES RELATIVE PERCENT: 6.5 %
MONOCYTES RELATIVE PERCENT: 6.5 %
MONOCYTES RELATIVE PERCENT: 8.6 %
MYELOCYTE PERCENT: 1 %
MYELOCYTE PERCENT: 2 %
MYELOCYTE PERCENT: 2 %
MYELOCYTE PERCENT: 5 %
NEUTROPHILS ABSOLUTE: 0.1 K/UL (ref 1.7–7.7)
NEUTROPHILS ABSOLUTE: 0.2 K/UL (ref 1.7–7.7)
NEUTROPHILS ABSOLUTE: 0.8 K/UL (ref 1.7–7.7)
NEUTROPHILS ABSOLUTE: 11 K/UL (ref 1.7–7.7)
NEUTROPHILS ABSOLUTE: 12.6 K/UL (ref 1.7–7.7)
NEUTROPHILS ABSOLUTE: 2.5 K/UL (ref 1.7–7.7)
NEUTROPHILS ABSOLUTE: 3.3 K/UL (ref 1.7–7.7)
NEUTROPHILS ABSOLUTE: 3.4 K/UL (ref 1.7–7.7)
NEUTROPHILS ABSOLUTE: 3.5 K/UL (ref 1.7–7.7)
NEUTROPHILS ABSOLUTE: 3.6 K/UL (ref 1.7–7.7)
NEUTROPHILS ABSOLUTE: 3.7 K/UL (ref 1.7–7.7)
NEUTROPHILS ABSOLUTE: 3.8 K/UL (ref 1.7–7.7)
NEUTROPHILS ABSOLUTE: 4.3 K/UL (ref 1.7–7.7)
NEUTROPHILS ABSOLUTE: 4.3 K/UL (ref 1.7–7.7)
NEUTROPHILS ABSOLUTE: 4.8 K/UL (ref 1.7–7.7)
NEUTROPHILS ABSOLUTE: 6 K/UL (ref 1.7–7.7)
NEUTROPHILS ABSOLUTE: 9.4 K/UL (ref 1.7–7.7)
NEUTROPHILS RELATIVE PERCENT: 18 %
NEUTROPHILS RELATIVE PERCENT: 29.8 %
NEUTROPHILS RELATIVE PERCENT: 39 %
NEUTROPHILS RELATIVE PERCENT: 39 %
NEUTROPHILS RELATIVE PERCENT: 55 %
NEUTROPHILS RELATIVE PERCENT: 65.7 %
NEUTROPHILS RELATIVE PERCENT: 68 %
NEUTROPHILS RELATIVE PERCENT: 68.6 %
NEUTROPHILS RELATIVE PERCENT: 70.2 %
NEUTROPHILS RELATIVE PERCENT: 76.1 %
NEUTROPHILS RELATIVE PERCENT: 78 %
NEUTROPHILS RELATIVE PERCENT: 79 %
NEUTROPHILS RELATIVE PERCENT: 81 %
NEUTROPHILS RELATIVE PERCENT: 81.6 %
NEUTROPHILS RELATIVE PERCENT: 84.7 %
NEUTROPHILS RELATIVE PERCENT: 87 %
NEUTROPHILS RELATIVE PERCENT: 87.9 %
NITRITE, URINE: NEGATIVE
NITRITE, URINE: POSITIVE
NUCLEATED RED BLOOD CELLS: 1 /100 WBC
NUCLEATED RED BLOOD CELLS: 1 /100 WBC
NUCLEATED RED BLOOD CELLS: 2 /100 WBC
NUCLEATED RED BLOOD CELLS: 3 /100 WBC
O2 CONTENT ARTERIAL: 11 ML/DL
O2 CONTENT, VEN: 13 VOL %
O2 SAT, ARTERIAL: 99.2 %
O2 SAT, VEN: 92 %
O2 THERAPY: ABNORMAL
O2 THERAPY: ABNORMAL
ORGANISM: ABNORMAL
OVALOCYTES: ABNORMAL
PARATHYROID HORMONE INTACT: 172.2 PG/ML (ref 14–72)
PCO2 ARTERIAL: 34.5 MMHG (ref 35–45)
PCO2, VEN: 52.8 MMHG (ref 40–50)
PDW BLD-RTO: 14.2 % (ref 12.4–15.4)
PDW BLD-RTO: 14.2 % (ref 12.4–15.4)
PDW BLD-RTO: 14.4 % (ref 12.4–15.4)
PDW BLD-RTO: 14.7 % (ref 12.4–15.4)
PDW BLD-RTO: 14.8 % (ref 12.4–15.4)
PDW BLD-RTO: 14.9 % (ref 12.4–15.4)
PDW BLD-RTO: 15.1 % (ref 12.4–15.4)
PDW BLD-RTO: 15.4 % (ref 12.4–15.4)
PDW BLD-RTO: 15.5 % (ref 12.4–15.4)
PDW BLD-RTO: 15.9 % (ref 12.4–15.4)
PDW BLD-RTO: 16 % (ref 12.4–15.4)
PDW BLD-RTO: 16.1 % (ref 12.4–15.4)
PDW BLD-RTO: 16.2 % (ref 12.4–15.4)
PDW BLD-RTO: 16.2 % (ref 12.4–15.4)
PDW BLD-RTO: 16.3 % (ref 12.4–15.4)
PDW BLD-RTO: 16.4 % (ref 12.4–15.4)
PDW BLD-RTO: 17.3 % (ref 12.4–15.4)
PDW BLD-RTO: 17.8 % (ref 12.4–15.4)
PDW BLD-RTO: 17.8 % (ref 12.4–15.4)
PDW BLD-RTO: 17.9 % (ref 12.4–15.4)
PDW BLD-RTO: 17.9 % (ref 12.4–15.4)
PDW BLD-RTO: 18.1 % (ref 12.4–15.4)
PDW BLD-RTO: 18.3 % (ref 12.4–15.4)
PDW BLD-RTO: 18.4 % (ref 12.4–15.4)
PDW BLD-RTO: 18.5 % (ref 12.4–15.4)
PDW BLD-RTO: 18.5 % (ref 12.4–15.4)
PDW BLD-RTO: 18.6 % (ref 12.4–15.4)
PDW BLD-RTO: 18.7 % (ref 12.4–15.4)
PDW BLD-RTO: 19.2 % (ref 12.4–15.4)
PH ARTERIAL: 7.38 (ref 7.35–7.45)
PH UA: 5 (ref 5–8)
PH UA: 5.5 (ref 5–8)
PH UA: 6 (ref 5–8)
PH UA: 6.5 (ref 5–8)
PH UA: 7.5 (ref 5–8)
PH UA: 7.5 (ref 5–8)
PH UA: 8.5 (ref 5–8)
PH VENOUS: 7.33 (ref 7.35–7.45)
PHOSPHORUS  URINE: 246.2 MG/DL (ref 40–136)
PHOSPHORUS: 0.8 MG/DL (ref 2.5–4.9)
PHOSPHORUS: 0.9 MG/DL (ref 2.5–4.9)
PHOSPHORUS: 0.9 MG/DL (ref 2.5–4.9)
PHOSPHORUS: 1.2 MG/DL (ref 2.5–4.9)
PHOSPHORUS: 1.2 MG/DL (ref 2.5–4.9)
PHOSPHORUS: 1.3 MG/DL (ref 2.5–4.9)
PHOSPHORUS: 1.4 MG/DL (ref 2.5–4.9)
PHOSPHORUS: 1.4 MG/DL (ref 2.5–4.9)
PHOSPHORUS: 1.7 MG/DL (ref 2.5–4.9)
PHOSPHORUS: 2 MG/DL (ref 2.5–4.9)
PHOSPHORUS: 2.7 MG/DL (ref 2.5–4.9)
PHOSPHORUS: 2.9 MG/DL (ref 2.5–4.9)
PHOSPHORUS: 3 MG/DL (ref 2.5–4.9)
PHOSPHORUS: 3.1 MG/DL (ref 2.5–4.9)
PLATELET # BLD: 104 K/UL (ref 135–450)
PLATELET # BLD: 105 K/UL (ref 135–450)
PLATELET # BLD: 157 K/UL (ref 135–450)
PLATELET # BLD: 181 K/UL (ref 135–450)
PLATELET # BLD: 188 K/UL (ref 135–450)
PLATELET # BLD: 191 K/UL (ref 135–450)
PLATELET # BLD: 197 K/UL (ref 135–450)
PLATELET # BLD: 197 K/UL (ref 135–450)
PLATELET # BLD: 199 K/UL (ref 135–450)
PLATELET # BLD: 201 K/UL (ref 135–450)
PLATELET # BLD: 205 K/UL (ref 135–450)
PLATELET # BLD: 205 K/UL (ref 135–450)
PLATELET # BLD: 215 K/UL (ref 135–450)
PLATELET # BLD: 220 K/UL (ref 135–450)
PLATELET # BLD: 221 K/UL (ref 135–450)
PLATELET # BLD: 228 K/UL (ref 135–450)
PLATELET # BLD: 230 K/UL (ref 135–450)
PLATELET # BLD: 230 K/UL (ref 135–450)
PLATELET # BLD: 243 K/UL (ref 135–450)
PLATELET # BLD: 248 K/UL (ref 135–450)
PLATELET # BLD: 253 K/UL (ref 135–450)
PLATELET # BLD: 258 K/UL (ref 135–450)
PLATELET # BLD: 265 K/UL (ref 135–450)
PLATELET # BLD: 269 K/UL (ref 135–450)
PLATELET # BLD: 273 K/UL (ref 135–450)
PLATELET # BLD: 286 K/UL (ref 135–450)
PLATELET # BLD: 294 K/UL (ref 135–450)
PLATELET # BLD: 307 K/UL (ref 135–450)
PLATELET # BLD: 316 K/UL (ref 135–450)
PLATELET # BLD: 338 K/UL (ref 135–450)
PLATELET # BLD: 350 K/UL (ref 135–450)
PLATELET # BLD: 471 K/UL (ref 135–450)
PLATELET # BLD: 550 K/UL (ref 135–450)
PLATELET SLIDE REVIEW: ABNORMAL
PLATELET SLIDE REVIEW: ABNORMAL
PLATELET SLIDE REVIEW: ADEQUATE
PMV BLD AUTO: 6.3 FL (ref 5–10.5)
PMV BLD AUTO: 6.3 FL (ref 5–10.5)
PMV BLD AUTO: 6.6 FL (ref 5–10.5)
PMV BLD AUTO: 6.7 FL (ref 5–10.5)
PMV BLD AUTO: 6.7 FL (ref 5–10.5)
PMV BLD AUTO: 6.8 FL (ref 5–10.5)
PMV BLD AUTO: 6.8 FL (ref 5–10.5)
PMV BLD AUTO: 7 FL (ref 5–10.5)
PMV BLD AUTO: 7.1 FL (ref 5–10.5)
PMV BLD AUTO: 7.1 FL (ref 5–10.5)
PMV BLD AUTO: 7.2 FL (ref 5–10.5)
PMV BLD AUTO: 7.3 FL (ref 5–10.5)
PMV BLD AUTO: 7.3 FL (ref 5–10.5)
PMV BLD AUTO: 7.4 FL (ref 5–10.5)
PMV BLD AUTO: 7.4 FL (ref 5–10.5)
PMV BLD AUTO: 7.5 FL (ref 5–10.5)
PMV BLD AUTO: 7.6 FL (ref 5–10.5)
PMV BLD AUTO: 7.7 FL (ref 5–10.5)
PMV BLD AUTO: 7.8 FL (ref 5–10.5)
PMV BLD AUTO: 7.9 FL (ref 5–10.5)
PMV BLD AUTO: 7.9 FL (ref 5–10.5)
PMV BLD AUTO: 8 FL (ref 5–10.5)
PMV BLD AUTO: 8.3 FL (ref 5–10.5)
PO2 ARTERIAL: 113 MMHG (ref 75–108)
PO2, VEN: 67.3 MMHG (ref 25–40)
POIKILOCYTES: ABNORMAL
POLYCHROMASIA: ABNORMAL
POTASSIUM REFLEX MAGNESIUM: 3.4 MMOL/L (ref 3.5–5.1)
POTASSIUM REFLEX MAGNESIUM: 3.5 MMOL/L (ref 3.5–5.1)
POTASSIUM REFLEX MAGNESIUM: 3.9 MMOL/L (ref 3.5–5.1)
POTASSIUM REFLEX MAGNESIUM: 3.9 MMOL/L (ref 3.5–5.1)
POTASSIUM REFLEX MAGNESIUM: 4.4 MMOL/L (ref 3.5–5.1)
POTASSIUM SERPL-SCNC: 3 MMOL/L (ref 3.5–5.1)
POTASSIUM SERPL-SCNC: 3.1 MMOL/L (ref 3.5–5.1)
POTASSIUM SERPL-SCNC: 3.3 MMOL/L (ref 3.5–5.1)
POTASSIUM SERPL-SCNC: 3.4 MMOL/L (ref 3.5–5.1)
POTASSIUM SERPL-SCNC: 3.4 MMOL/L (ref 3.5–5.1)
POTASSIUM SERPL-SCNC: 3.5 MMOL/L (ref 3.5–5.1)
POTASSIUM SERPL-SCNC: 3.5 MMOL/L (ref 3.5–5.1)
POTASSIUM SERPL-SCNC: 3.6 MMOL/L (ref 3.5–5.1)
POTASSIUM SERPL-SCNC: 3.7 MMOL/L (ref 3.5–5.1)
POTASSIUM SERPL-SCNC: 3.7 MMOL/L (ref 3.5–5.1)
POTASSIUM SERPL-SCNC: 3.8 MMOL/L (ref 3.5–5.1)
POTASSIUM SERPL-SCNC: 3.9 MMOL/L (ref 3.5–5.1)
POTASSIUM SERPL-SCNC: 3.9 MMOL/L (ref 3.5–5.1)
POTASSIUM SERPL-SCNC: 4 MMOL/L (ref 3.5–5.1)
POTASSIUM SERPL-SCNC: 4.1 MMOL/L (ref 3.5–5.1)
POTASSIUM SERPL-SCNC: 4.2 MMOL/L (ref 3.5–5.1)
POTASSIUM SERPL-SCNC: 4.3 MMOL/L (ref 3.5–5.1)
POTASSIUM SERPL-SCNC: 4.5 MMOL/L (ref 3.5–5.1)
POTASSIUM SERPL-SCNC: 4.5 MMOL/L (ref 3.5–5.1)
POTASSIUM SERPL-SCNC: 4.7 MMOL/L (ref 3.5–5.1)
PRO-BNP: 313 PG/ML (ref 0–124)
PRO-BNP: 940 PG/ML (ref 0–124)
PROMYELOCYTES PERCENT: 1 %
PROMYELOCYTES PERCENT: 1 %
PROTEIN UA: 100 MG/DL
PROTEIN UA: 30 MG/DL
PROTEIN UA: 30 MG/DL
PROTEIN UA: NEGATIVE MG/DL
PROTEIN UA: NEGATIVE MG/DL
PROTHROMBIN TIME: 10.1 SEC (ref 9.8–13)
PROTHROMBIN TIME: 10.6 SEC (ref 9.8–13)
PROTHROMBIN TIME: 11.1 SEC (ref 9.8–13)
PROTHROMBIN TIME: 12.4 SEC (ref 9.8–13)
PROTHROMBIN TIME: 14.8 SEC (ref 9.8–13)
PROTHROMBIN TIME: 17.1 SEC (ref 9.8–13)
PROTHROMBIN TIME: 9.9 SEC (ref 9.8–13)
RBC # BLD: 2.27 M/UL (ref 4.2–5.9)
RBC # BLD: 2.29 M/UL (ref 4.2–5.9)
RBC # BLD: 2.41 M/UL (ref 4.2–5.9)
RBC # BLD: 2.46 M/UL (ref 4.2–5.9)
RBC # BLD: 2.52 M/UL (ref 4.2–5.9)
RBC # BLD: 2.57 M/UL (ref 4.2–5.9)
RBC # BLD: 2.6 M/UL (ref 4.2–5.9)
RBC # BLD: 2.6 M/UL (ref 4.2–5.9)
RBC # BLD: 2.62 M/UL (ref 4.2–5.9)
RBC # BLD: 2.64 M/UL (ref 4.2–5.9)
RBC # BLD: 2.68 M/UL (ref 4.2–5.9)
RBC # BLD: 2.7 M/UL (ref 4.2–5.9)
RBC # BLD: 2.75 M/UL (ref 4.2–5.9)
RBC # BLD: 2.76 M/UL (ref 4.2–5.9)
RBC # BLD: 2.77 M/UL (ref 4.2–5.9)
RBC # BLD: 2.77 M/UL (ref 4.2–5.9)
RBC # BLD: 2.78 M/UL (ref 4.2–5.9)
RBC # BLD: 2.79 M/UL (ref 4.2–5.9)
RBC # BLD: 2.82 M/UL (ref 4.2–5.9)
RBC # BLD: 2.83 M/UL (ref 4.2–5.9)
RBC # BLD: 2.87 M/UL (ref 4.2–5.9)
RBC # BLD: 2.95 M/UL (ref 4.2–5.9)
RBC # BLD: 2.97 M/UL (ref 4.2–5.9)
RBC # BLD: 2.97 M/UL (ref 4.2–5.9)
RBC # BLD: 3.01 M/UL (ref 4.2–5.9)
RBC # BLD: 3.01 M/UL (ref 4.2–5.9)
RBC # BLD: 3.02 M/UL (ref 4.2–5.9)
RBC # BLD: 3.13 M/UL (ref 4.2–5.9)
RBC # BLD: 3.15 M/UL (ref 4.2–5.9)
RBC # BLD: 3.18 M/UL (ref 4.2–5.9)
RBC # BLD: 3.21 M/UL (ref 4.2–5.9)
RBC # BLD: 3.25 M/UL (ref 4.2–5.9)
RBC # BLD: 3.36 M/UL (ref 4.2–5.9)
RBC # BLD: NORMAL 10*6/UL
RBC UA: 26 /HPF (ref 0–4)
RBC UA: 3 /HPF (ref 0–4)
RBC UA: 87 /HPF (ref 0–4)
RBC UA: NORMAL /HPF (ref 0–2)
REASON FOR REJECTION: NORMAL
REASON FOR REJECTION: NORMAL
REJECTED TEST: NORMAL
REJECTED TEST: NORMAL
RETICULOCYTE ABSOLUTE COUNT: 0.11 M/UL
RETICULOCYTE COUNT PCT: 4.03 % (ref 0.5–2.18)
SLIDE REVIEW: ABNORMAL
SMUDGE CELLS: PRESENT
SMUDGE CELLS: PRESENT
SODIUM BLD-SCNC: 130 MMOL/L (ref 136–145)
SODIUM BLD-SCNC: 130 MMOL/L (ref 136–145)
SODIUM BLD-SCNC: 131 MMOL/L (ref 136–145)
SODIUM BLD-SCNC: 132 MMOL/L (ref 136–145)
SODIUM BLD-SCNC: 133 MMOL/L (ref 136–145)
SODIUM BLD-SCNC: 134 MMOL/L (ref 136–145)
SODIUM BLD-SCNC: 135 MMOL/L (ref 136–145)
SODIUM BLD-SCNC: 136 MMOL/L (ref 136–145)
SODIUM BLD-SCNC: 136 MMOL/L (ref 136–145)
SODIUM BLD-SCNC: 138 MMOL/L (ref 136–145)
SODIUM BLD-SCNC: 139 MMOL/L (ref 136–145)
SODIUM BLD-SCNC: 139 MMOL/L (ref 136–145)
SODIUM BLD-SCNC: 140 MMOL/L (ref 136–145)
SODIUM BLD-SCNC: 141 MMOL/L (ref 136–145)
SODIUM BLD-SCNC: 141 MMOL/L (ref 136–145)
SODIUM BLD-SCNC: 142 MMOL/L (ref 136–145)
SODIUM BLD-SCNC: 143 MMOL/L (ref 136–145)
SPECIFIC GRAVITY UA: 1.01 (ref 1–1.03)
SPECIFIC GRAVITY UA: 1.02 (ref 1–1.03)
SPECIFIC GRAVITY UA: >=1.03 (ref 1–1.03)
TCO2 ARTERIAL: 47.8 MMOL/L
TCO2 CALC VENOUS: 66 MMOL/L
TEAR DROP CELLS: ABNORMAL
TOTAL IRON BINDING CAPACITY: 151 UG/DL (ref 260–445)
TOTAL PROTEIN: 5.7 G/DL (ref 6.4–8.2)
TOTAL PROTEIN: 5.9 G/DL (ref 6.4–8.2)
TOTAL PROTEIN: 5.9 G/DL (ref 6.4–8.2)
TOTAL PROTEIN: 6.1 G/DL (ref 6.4–8.2)
TOTAL PROTEIN: 6.2 G/DL (ref 6.4–8.2)
TOTAL PROTEIN: 6.3 G/DL (ref 6.4–8.2)
TOTAL PROTEIN: 6.4 G/DL (ref 6.4–8.2)
TOTAL PROTEIN: 6.8 G/DL (ref 6.4–8.2)
TOTAL PROTEIN: 6.8 G/DL (ref 6.4–8.2)
TOXIC GRANULATION: PRESENT
TRIGL SERPL-MCNC: 215 MG/DL (ref 0–150)
TROPONIN: <0.01 NG/ML
TSH SERPL DL<=0.05 MIU/L-ACNC: 0.52 UIU/ML (ref 0.27–4.2)
URINE CULTURE, ROUTINE: ABNORMAL
URINE CULTURE, ROUTINE: NORMAL
URINE REFLEX TO CULTURE: ABNORMAL
URINE REFLEX TO CULTURE: NORMAL
URINE REFLEX TO CULTURE: YES
URINE TYPE: ABNORMAL
URINE TYPE: NORMAL
UROBILINOGEN, URINE: 0.2 E.U./DL
UROBILINOGEN, URINE: 1 E.U./DL
VACUOLATED NEUTROPHILS: PRESENT
VACUOLATED NEUTROPHILS: PRESENT
VANCOMYCIN TROUGH: 14 UG/ML (ref 10–20)
VANCOMYCIN TROUGH: 15.3 UG/ML (ref 10–20)
VITAMIN B-12: >2000 PG/ML (ref 211–911)
VITAMIN D 25-HYDROXY: 8.3 NG/ML
VLDLC SERPL CALC-MCNC: 43 MG/DL
WBC # BLD: 0.5 K/UL (ref 4–11)
WBC # BLD: 0.6 K/UL (ref 4–11)
WBC # BLD: 0.8 K/UL (ref 4–11)
WBC # BLD: 1.5 K/UL (ref 4–11)
WBC # BLD: 10.2 K/UL (ref 4–11)
WBC # BLD: 13.4 K/UL (ref 4–11)
WBC # BLD: 15.4 K/UL (ref 4–11)
WBC # BLD: 3.6 K/UL (ref 4–11)
WBC # BLD: 3.7 K/UL (ref 4–11)
WBC # BLD: 3.9 K/UL (ref 4–11)
WBC # BLD: 4 K/UL (ref 4–11)
WBC # BLD: 4.1 K/UL (ref 4–11)
WBC # BLD: 4.1 K/UL (ref 4–11)
WBC # BLD: 4.3 K/UL (ref 4–11)
WBC # BLD: 4.5 K/UL (ref 4–11)
WBC # BLD: 4.8 K/UL (ref 4–11)
WBC # BLD: 4.8 K/UL (ref 4–11)
WBC # BLD: 5.1 K/UL (ref 4–11)
WBC # BLD: 5.3 K/UL (ref 4–11)
WBC # BLD: 5.4 K/UL (ref 4–11)
WBC # BLD: 5.4 K/UL (ref 4–11)
WBC # BLD: 5.6 K/UL (ref 4–11)
WBC # BLD: 5.7 K/UL (ref 4–11)
WBC # BLD: 5.8 K/UL (ref 4–11)
WBC # BLD: 5.9 K/UL (ref 4–11)
WBC # BLD: 6.6 K/UL (ref 4–11)
WBC # BLD: 6.7 K/UL (ref 4–11)
WBC # BLD: 6.8 K/UL (ref 4–11)
WBC # BLD: 7.4 K/UL (ref 4–11)
WBC # BLD: 7.4 K/UL (ref 4–11)
WBC # BLD: 8.4 K/UL (ref 4–11)
WBC UA: 12 /HPF (ref 0–5)
WBC UA: 3 /HPF (ref 0–5)
WBC UA: 33 /HPF (ref 0–5)
WBC UA: 4 /HPF (ref 0–5)
WBC UA: 5 /HPF (ref 0–5)

## 2019-01-01 PROCEDURE — 6360000002 HC RX W HCPCS: Performed by: HOSPITALIST

## 2019-01-01 PROCEDURE — 6370000000 HC RX 637 (ALT 250 FOR IP): Performed by: NURSE PRACTITIONER

## 2019-01-01 PROCEDURE — 96376 TX/PRO/DX INJ SAME DRUG ADON: CPT

## 2019-01-01 PROCEDURE — 83880 ASSAY OF NATRIURETIC PEPTIDE: CPT

## 2019-01-01 PROCEDURE — 1200000000 HC SEMI PRIVATE

## 2019-01-01 PROCEDURE — 3023F SPIROM DOC REV: CPT | Performed by: INTERNAL MEDICINE

## 2019-01-01 PROCEDURE — 2580000003 HC RX 258: Performed by: INTERNAL MEDICINE

## 2019-01-01 PROCEDURE — 36415 COLL VENOUS BLD VENIPUNCTURE: CPT

## 2019-01-01 PROCEDURE — 85027 COMPLETE CBC AUTOMATED: CPT

## 2019-01-01 PROCEDURE — 88341 IMHCHEM/IMCYTCHM EA ADD ANTB: CPT

## 2019-01-01 PROCEDURE — 96374 THER/PROPH/DIAG INJ IV PUSH: CPT

## 2019-01-01 PROCEDURE — 99285 EMERGENCY DEPT VISIT HI MDM: CPT

## 2019-01-01 PROCEDURE — 84100 ASSAY OF PHOSPHORUS: CPT

## 2019-01-01 PROCEDURE — 85025 COMPLETE CBC W/AUTO DIFF WBC: CPT

## 2019-01-01 PROCEDURE — 86901 BLOOD TYPING SEROLOGIC RH(D): CPT

## 2019-01-01 PROCEDURE — 6370000000 HC RX 637 (ALT 250 FOR IP): Performed by: HOSPITALIST

## 2019-01-01 PROCEDURE — 84132 ASSAY OF SERUM POTASSIUM: CPT

## 2019-01-01 PROCEDURE — 6360000002 HC RX W HCPCS: Performed by: NURSE PRACTITIONER

## 2019-01-01 PROCEDURE — 97530 THERAPEUTIC ACTIVITIES: CPT

## 2019-01-01 PROCEDURE — 2580000003 HC RX 258: Performed by: NURSE PRACTITIONER

## 2019-01-01 PROCEDURE — 2709999900 US NEEDLE BIOPSY ABDOMINAL MASS PERCUTANEOUS

## 2019-01-01 PROCEDURE — 6360000002 HC RX W HCPCS: Performed by: NURSE ANESTHETIST, CERTIFIED REGISTERED

## 2019-01-01 PROCEDURE — G8599 NO ASA/ANTIPLAT THER USE RNG: HCPCS | Performed by: INTERNAL MEDICINE

## 2019-01-01 PROCEDURE — 94761 N-INVAS EAR/PLS OXIMETRY MLT: CPT

## 2019-01-01 PROCEDURE — 85045 AUTOMATED RETICULOCYTE COUNT: CPT

## 2019-01-01 PROCEDURE — 2580000003 HC RX 258: Performed by: UROLOGY

## 2019-01-01 PROCEDURE — 6360000002 HC RX W HCPCS: Performed by: RADIOLOGY

## 2019-01-01 PROCEDURE — 6370000000 HC RX 637 (ALT 250 FOR IP): Performed by: INTERNAL MEDICINE

## 2019-01-01 PROCEDURE — 99152 MOD SED SAME PHYS/QHP 5/>YRS: CPT

## 2019-01-01 PROCEDURE — 88305 TISSUE EXAM BY PATHOLOGIST: CPT

## 2019-01-01 PROCEDURE — 1123F ACP DISCUSS/DSCN MKR DOCD: CPT | Performed by: INTERNAL MEDICINE

## 2019-01-01 PROCEDURE — 80053 COMPREHEN METABOLIC PANEL: CPT

## 2019-01-01 PROCEDURE — 87086 URINE CULTURE/COLONY COUNT: CPT

## 2019-01-01 PROCEDURE — 74176 CT ABD & PELVIS W/O CONTRAST: CPT

## 2019-01-01 PROCEDURE — 2580000003 HC RX 258: Performed by: HOSPITALIST

## 2019-01-01 PROCEDURE — 74022 RADEX COMPL AQT ABD SERIES: CPT

## 2019-01-01 PROCEDURE — G0378 HOSPITAL OBSERVATION PER HR: HCPCS

## 2019-01-01 PROCEDURE — 94760 N-INVAS EAR/PLS OXIMETRY 1: CPT

## 2019-01-01 PROCEDURE — 7100000000 HC PACU RECOVERY - FIRST 15 MIN: Performed by: UROLOGY

## 2019-01-01 PROCEDURE — 2500000003 HC RX 250 WO HCPCS: Performed by: NURSE PRACTITIONER

## 2019-01-01 PROCEDURE — 99232 SBSQ HOSP IP/OBS MODERATE 35: CPT | Performed by: INTERNAL MEDICINE

## 2019-01-01 PROCEDURE — 84520 ASSAY OF UREA NITROGEN: CPT

## 2019-01-01 PROCEDURE — 77001 FLUOROGUIDE FOR VEIN DEVICE: CPT

## 2019-01-01 PROCEDURE — 94640 AIRWAY INHALATION TREATMENT: CPT

## 2019-01-01 PROCEDURE — 87040 BLOOD CULTURE FOR BACTERIA: CPT

## 2019-01-01 PROCEDURE — 6370000000 HC RX 637 (ALT 250 FOR IP): Performed by: UROLOGY

## 2019-01-01 PROCEDURE — 4004F PT TOBACCO SCREEN RCVD TLK: CPT | Performed by: INTERNAL MEDICINE

## 2019-01-01 PROCEDURE — 2500000003 HC RX 250 WO HCPCS: Performed by: UROLOGY

## 2019-01-01 PROCEDURE — 7100000011 HC PHASE II RECOVERY - ADDTL 15 MIN: Performed by: UROLOGY

## 2019-01-01 PROCEDURE — 71045 X-RAY EXAM CHEST 1 VIEW: CPT

## 2019-01-01 PROCEDURE — 76872 US TRANSRECTAL: CPT

## 2019-01-01 PROCEDURE — 82803 BLOOD GASES ANY COMBINATION: CPT

## 2019-01-01 PROCEDURE — 6360000002 HC RX W HCPCS: Performed by: INTERNAL MEDICINE

## 2019-01-01 PROCEDURE — 99233 SBSQ HOSP IP/OBS HIGH 50: CPT | Performed by: INTERNAL MEDICINE

## 2019-01-01 PROCEDURE — G8428 CUR MEDS NOT DOCUMENT: HCPCS | Performed by: INTERNAL MEDICINE

## 2019-01-01 PROCEDURE — 80069 RENAL FUNCTION PANEL: CPT

## 2019-01-01 PROCEDURE — 73502 X-RAY EXAM HIP UNI 2-3 VIEWS: CPT

## 2019-01-01 PROCEDURE — 83735 ASSAY OF MAGNESIUM: CPT

## 2019-01-01 PROCEDURE — 74018 RADEX ABDOMEN 1 VIEW: CPT

## 2019-01-01 PROCEDURE — 2700000000 HC OXYGEN THERAPY PER DAY

## 2019-01-01 PROCEDURE — 86850 RBC ANTIBODY SCREEN: CPT

## 2019-01-01 PROCEDURE — 85730 THROMBOPLASTIN TIME PARTIAL: CPT

## 2019-01-01 PROCEDURE — 2709999900 HC NON-CHARGEABLE SUPPLY: Performed by: UROLOGY

## 2019-01-01 PROCEDURE — 71260 CT THORAX DX C+: CPT

## 2019-01-01 PROCEDURE — 84484 ASSAY OF TROPONIN QUANT: CPT

## 2019-01-01 PROCEDURE — 6370000000 HC RX 637 (ALT 250 FOR IP): Performed by: PSYCHIATRY & NEUROLOGY

## 2019-01-01 PROCEDURE — 6360000002 HC RX W HCPCS: Performed by: PHYSICIAN ASSISTANT

## 2019-01-01 PROCEDURE — 7100000010 HC PHASE II RECOVERY - FIRST 15 MIN

## 2019-01-01 PROCEDURE — 83605 ASSAY OF LACTIC ACID: CPT

## 2019-01-01 PROCEDURE — 2580000003 HC RX 258: Performed by: EMERGENCY MEDICINE

## 2019-01-01 PROCEDURE — 86923 COMPATIBILITY TEST ELECTRIC: CPT

## 2019-01-01 PROCEDURE — 6370000000 HC RX 637 (ALT 250 FOR IP): Performed by: PHYSICIAN ASSISTANT

## 2019-01-01 PROCEDURE — 2580000003 HC RX 258

## 2019-01-01 PROCEDURE — 99282 EMERGENCY DEPT VISIT SF MDM: CPT

## 2019-01-01 PROCEDURE — 96365 THER/PROPH/DIAG IV INF INIT: CPT

## 2019-01-01 PROCEDURE — 7100000011 HC PHASE II RECOVERY - ADDTL 15 MIN

## 2019-01-01 PROCEDURE — 0T2BX0Z CHANGE DRAINAGE DEVICE IN BLADDER, EXTERNAL APPROACH: ICD-10-PCS | Performed by: UROLOGY

## 2019-01-01 PROCEDURE — 6360000002 HC RX W HCPCS

## 2019-01-01 PROCEDURE — 97166 OT EVAL MOD COMPLEX 45 MIN: CPT

## 2019-01-01 PROCEDURE — 3600000004 HC SURGERY LEVEL 4 BASE: Performed by: UROLOGY

## 2019-01-01 PROCEDURE — 99291 CRITICAL CARE FIRST HOUR: CPT

## 2019-01-01 PROCEDURE — 2500000003 HC RX 250 WO HCPCS: Performed by: NURSE ANESTHETIST, CERTIFIED REGISTERED

## 2019-01-01 PROCEDURE — 83540 ASSAY OF IRON: CPT

## 2019-01-01 PROCEDURE — 2580000003 HC RX 258: Performed by: PHYSICIAN ASSISTANT

## 2019-01-01 PROCEDURE — 80048 BASIC METABOLIC PNL TOTAL CA: CPT

## 2019-01-01 PROCEDURE — 83970 ASSAY OF PARATHORMONE: CPT

## 2019-01-01 PROCEDURE — 96375 TX/PRO/DX INJ NEW DRUG ADDON: CPT

## 2019-01-01 PROCEDURE — 71250 CT THORAX DX C-: CPT

## 2019-01-01 PROCEDURE — 99223 1ST HOSP IP/OBS HIGH 75: CPT | Performed by: PSYCHIATRY & NEUROLOGY

## 2019-01-01 PROCEDURE — 99153 MOD SED SAME PHYS/QHP EA: CPT

## 2019-01-01 PROCEDURE — 93010 ELECTROCARDIOGRAM REPORT: CPT | Performed by: INTERNAL MEDICINE

## 2019-01-01 PROCEDURE — 82565 ASSAY OF CREATININE: CPT

## 2019-01-01 PROCEDURE — 82570 ASSAY OF URINE CREATININE: CPT

## 2019-01-01 PROCEDURE — 96361 HYDRATE IV INFUSION ADD-ON: CPT

## 2019-01-01 PROCEDURE — 2580000003 HC RX 258: Performed by: ANESTHESIOLOGY

## 2019-01-01 PROCEDURE — 3600000014 HC SURGERY LEVEL 4 ADDTL 15MIN: Performed by: UROLOGY

## 2019-01-01 PROCEDURE — 80061 LIPID PANEL: CPT

## 2019-01-01 PROCEDURE — 82728 ASSAY OF FERRITIN: CPT

## 2019-01-01 PROCEDURE — 94762 N-INVAS EAR/PLS OXIMTRY CONT: CPT

## 2019-01-01 PROCEDURE — 70450 CT HEAD/BRAIN W/O DYE: CPT

## 2019-01-01 PROCEDURE — 82306 VITAMIN D 25 HYDROXY: CPT

## 2019-01-01 PROCEDURE — 97116 GAIT TRAINING THERAPY: CPT

## 2019-01-01 PROCEDURE — 87186 SC STD MICRODIL/AGAR DIL: CPT

## 2019-01-01 PROCEDURE — 7100000001 HC PACU RECOVERY - ADDTL 15 MIN: Performed by: UROLOGY

## 2019-01-01 PROCEDURE — 99221 1ST HOSP IP/OBS SF/LOW 40: CPT | Performed by: PSYCHIATRY & NEUROLOGY

## 2019-01-01 PROCEDURE — 83036 HEMOGLOBIN GLYCOSYLATED A1C: CPT

## 2019-01-01 PROCEDURE — 82607 VITAMIN B-12: CPT

## 2019-01-01 PROCEDURE — 77012 CT SCAN FOR NEEDLE BIOPSY: CPT

## 2019-01-01 PROCEDURE — 85610 PROTHROMBIN TIME: CPT

## 2019-01-01 PROCEDURE — P9016 RBC LEUKOCYTES REDUCED: HCPCS

## 2019-01-01 PROCEDURE — 93005 ELECTROCARDIOGRAM TRACING: CPT | Performed by: EMERGENCY MEDICINE

## 2019-01-01 PROCEDURE — 93880 EXTRACRANIAL BILAT STUDY: CPT

## 2019-01-01 PROCEDURE — G8420 CALC BMI NORM PARAMETERS: HCPCS | Performed by: INTERNAL MEDICINE

## 2019-01-01 PROCEDURE — 71046 X-RAY EXAM CHEST 2 VIEWS: CPT

## 2019-01-01 PROCEDURE — 92523 SPEECH SOUND LANG COMPREHEN: CPT

## 2019-01-01 PROCEDURE — 96372 THER/PROPH/DIAG INJ SC/IM: CPT

## 2019-01-01 PROCEDURE — 88342 IMHCHEM/IMCYTCHM 1ST ANTB: CPT

## 2019-01-01 PROCEDURE — 6370000000 HC RX 637 (ALT 250 FOR IP): Performed by: RADIOLOGY

## 2019-01-01 PROCEDURE — 4040F PNEUMOC VAC/ADMIN/RCVD: CPT | Performed by: INTERNAL MEDICINE

## 2019-01-01 PROCEDURE — 99223 1ST HOSP IP/OBS HIGH 75: CPT | Performed by: INTERNAL MEDICINE

## 2019-01-01 PROCEDURE — 6370000000 HC RX 637 (ALT 250 FOR IP)

## 2019-01-01 PROCEDURE — 6360000004 HC RX CONTRAST MEDICATION: Performed by: INTERNAL MEDICINE

## 2019-01-01 PROCEDURE — G8427 DOCREV CUR MEDS BY ELIG CLIN: HCPCS | Performed by: INTERNAL MEDICINE

## 2019-01-01 PROCEDURE — C9113 INJ PANTOPRAZOLE SODIUM, VIA: HCPCS | Performed by: INTERNAL MEDICINE

## 2019-01-01 PROCEDURE — 2500000003 HC RX 250 WO HCPCS: Performed by: HOSPITALIST

## 2019-01-01 PROCEDURE — 6360000002 HC RX W HCPCS: Performed by: EMERGENCY MEDICINE

## 2019-01-01 PROCEDURE — 1111F DSCHRG MED/CURRENT MED MERGE: CPT | Performed by: INTERNAL MEDICINE

## 2019-01-01 PROCEDURE — 1101F PT FALLS ASSESS-DOCD LE1/YR: CPT | Performed by: INTERNAL MEDICINE

## 2019-01-01 PROCEDURE — 3700000001 HC ADD 15 MINUTES (ANESTHESIA): Performed by: UROLOGY

## 2019-01-01 PROCEDURE — 87328 CRYPTOSPORIDIUM AG IA: CPT

## 2019-01-01 PROCEDURE — 83690 ASSAY OF LIPASE: CPT

## 2019-01-01 PROCEDURE — 76937 US GUIDE VASCULAR ACCESS: CPT

## 2019-01-01 PROCEDURE — 81001 URINALYSIS AUTO W/SCOPE: CPT

## 2019-01-01 PROCEDURE — 86900 BLOOD TYPING SEROLOGIC ABO: CPT

## 2019-01-01 PROCEDURE — 99284 EMERGENCY DEPT VISIT MOD MDM: CPT

## 2019-01-01 PROCEDURE — C1788 PORT, INDWELLING, IMP: HCPCS

## 2019-01-01 PROCEDURE — C2627 CATH, SUPRAPUBIC/CYSTOSCOPIC: HCPCS | Performed by: UROLOGY

## 2019-01-01 PROCEDURE — 36600 WITHDRAWAL OF ARTERIAL BLOOD: CPT

## 2019-01-01 PROCEDURE — 97535 SELF CARE MNGMENT TRAINING: CPT

## 2019-01-01 PROCEDURE — 72131 CT LUMBAR SPINE W/O DYE: CPT

## 2019-01-01 PROCEDURE — G8482 FLU IMMUNIZE ORDER/ADMIN: HCPCS | Performed by: INTERNAL MEDICINE

## 2019-01-01 PROCEDURE — 3017F COLORECTAL CA SCREEN DOC REV: CPT | Performed by: INTERNAL MEDICINE

## 2019-01-01 PROCEDURE — 93005 ELECTROCARDIOGRAM TRACING: CPT | Performed by: PHYSICIAN ASSISTANT

## 2019-01-01 PROCEDURE — 3700000000 HC ANESTHESIA ATTENDED CARE: Performed by: UROLOGY

## 2019-01-01 PROCEDURE — 2500000003 HC RX 250 WO HCPCS: Performed by: INTERNAL MEDICINE

## 2019-01-01 PROCEDURE — 87505 NFCT AGENT DETECTION GI: CPT

## 2019-01-01 PROCEDURE — 81003 URINALYSIS AUTO W/O SCOPE: CPT

## 2019-01-01 PROCEDURE — 99214 OFFICE O/P EST MOD 30 MIN: CPT | Performed by: INTERNAL MEDICINE

## 2019-01-01 PROCEDURE — 80202 ASSAY OF VANCOMYCIN: CPT

## 2019-01-01 PROCEDURE — 92526 ORAL FUNCTION THERAPY: CPT

## 2019-01-01 PROCEDURE — 97165 OT EVAL LOW COMPLEX 30 MIN: CPT

## 2019-01-01 PROCEDURE — 84443 ASSAY THYROID STIM HORMONE: CPT

## 2019-01-01 PROCEDURE — 99232 SBSQ HOSP IP/OBS MODERATE 35: CPT | Performed by: PSYCHIATRY & NEUROLOGY

## 2019-01-01 PROCEDURE — 85018 HEMOGLOBIN: CPT

## 2019-01-01 PROCEDURE — 97161 PT EVAL LOW COMPLEX 20 MIN: CPT

## 2019-01-01 PROCEDURE — 99283 EMERGENCY DEPT VISIT LOW MDM: CPT

## 2019-01-01 PROCEDURE — 97162 PT EVAL MOD COMPLEX 30 MIN: CPT

## 2019-01-01 PROCEDURE — 85014 HEMATOCRIT: CPT

## 2019-01-01 PROCEDURE — 36591 DRAW BLOOD OFF VENOUS DEVICE: CPT

## 2019-01-01 PROCEDURE — 36430 TRANSFUSION BLD/BLD COMPNT: CPT

## 2019-01-01 PROCEDURE — 83550 IRON BINDING TEST: CPT

## 2019-01-01 PROCEDURE — 84105 ASSAY OF URINE PHOSPHORUS: CPT

## 2019-01-01 PROCEDURE — 36561 INSERT TUNNELED CV CATH: CPT

## 2019-01-01 PROCEDURE — 74177 CT ABD & PELVIS W/CONTRAST: CPT

## 2019-01-01 PROCEDURE — 93306 TTE W/DOPPLER COMPLETE: CPT

## 2019-01-01 PROCEDURE — 87077 CULTURE AEROBIC IDENTIFY: CPT

## 2019-01-01 PROCEDURE — G8926 SPIRO NO PERF OR DOC: HCPCS | Performed by: INTERNAL MEDICINE

## 2019-01-01 PROCEDURE — 97127 HC SP THER IVNTJ W/FOCUS COG FUNCJ: CPT

## 2019-01-01 PROCEDURE — 96367 TX/PROPH/DG ADDL SEQ IV INF: CPT

## 2019-01-01 PROCEDURE — 7100000010 HC PHASE II RECOVERY - FIRST 15 MIN: Performed by: UROLOGY

## 2019-01-01 PROCEDURE — 87336 ENTAMOEB HIST DISPR AG IA: CPT

## 2019-01-01 PROCEDURE — 0T2BX0Z CHANGE DRAINAGE DEVICE IN BLADDER, EXTERNAL APPROACH: ICD-10-PCS | Performed by: HOSPITALIST

## 2019-01-01 PROCEDURE — 92610 EVALUATE SWALLOWING FUNCTION: CPT

## 2019-01-01 PROCEDURE — 6360000002 HC RX W HCPCS: Performed by: ANESTHESIOLOGY

## 2019-01-01 PROCEDURE — 6360000002 HC RX W HCPCS: Performed by: UROLOGY

## 2019-01-01 PROCEDURE — 99213 OFFICE O/P EST LOW 20 MIN: CPT | Performed by: INTERNAL MEDICINE

## 2019-01-01 PROCEDURE — 70551 MRI BRAIN STEM W/O DYE: CPT

## 2019-01-01 PROCEDURE — G8598 ASA/ANTIPLAT THER USED: HCPCS | Performed by: INTERNAL MEDICINE

## 2019-01-01 PROCEDURE — 2500000003 HC RX 250 WO HCPCS: Performed by: PHYSICIAN ASSISTANT

## 2019-01-01 PROCEDURE — 0VBS3ZX EXCISION OF PENIS, PERCUTANEOUS APPROACH, DIAGNOSTIC: ICD-10-PCS | Performed by: RADIOLOGY

## 2019-01-01 PROCEDURE — 70490 CT SOFT TISSUE NECK W/O DYE: CPT

## 2019-01-01 PROCEDURE — 2500000003 HC RX 250 WO HCPCS: Performed by: RADIOLOGY

## 2019-01-01 RX ORDER — VANCOMYCIN HYDROCHLORIDE 1 G/200ML
15 INJECTION, SOLUTION INTRAVENOUS EVERY 12 HOURS
Status: DISCONTINUED | OUTPATIENT
Start: 2019-01-01 | End: 2019-01-01

## 2019-01-01 RX ORDER — OXYCODONE AND ACETAMINOPHEN 10; 325 MG/1; MG/1
1 TABLET ORAL EVERY 4 HOURS PRN
Status: DISCONTINUED | OUTPATIENT
Start: 2019-01-01 | End: 2019-01-01 | Stop reason: HOSPADM

## 2019-01-01 RX ORDER — SODIUM CHLORIDE 9 MG/ML
INJECTION, SOLUTION INTRAVENOUS
Status: COMPLETED
Start: 2019-01-01 | End: 2019-01-01

## 2019-01-01 RX ORDER — PROMETHAZINE HYDROCHLORIDE 25 MG/ML
6.25 INJECTION, SOLUTION INTRAMUSCULAR; INTRAVENOUS PRN
Status: DISCONTINUED | OUTPATIENT
Start: 2019-01-01 | End: 2019-01-01 | Stop reason: HOSPADM

## 2019-01-01 RX ORDER — LABETALOL HYDROCHLORIDE 5 MG/ML
10 INJECTION, SOLUTION INTRAVENOUS EVERY 10 MIN PRN
Status: DISCONTINUED | OUTPATIENT
Start: 2019-01-01 | End: 2019-01-01 | Stop reason: HOSPADM

## 2019-01-01 RX ORDER — POTASSIUM CHLORIDE 7.45 MG/ML
10 INJECTION INTRAVENOUS PRN
Status: DISCONTINUED | OUTPATIENT
Start: 2019-01-01 | End: 2019-01-01 | Stop reason: HOSPADM

## 2019-01-01 RX ORDER — METOCLOPRAMIDE HYDROCHLORIDE 5 MG/ML
10 INJECTION INTRAMUSCULAR; INTRAVENOUS ONCE
Status: COMPLETED | OUTPATIENT
Start: 2019-01-01 | End: 2019-01-01

## 2019-01-01 RX ORDER — PANTOPRAZOLE SODIUM 40 MG/1
40 TABLET, DELAYED RELEASE ORAL DAILY
Status: DISCONTINUED | OUTPATIENT
Start: 2019-01-01 | End: 2019-01-01 | Stop reason: HOSPADM

## 2019-01-01 RX ORDER — ACETAMINOPHEN 325 MG/1
650 TABLET ORAL EVERY 4 HOURS PRN
Status: DISCONTINUED | OUTPATIENT
Start: 2019-01-01 | End: 2019-01-01 | Stop reason: HOSPADM

## 2019-01-01 RX ORDER — HYDROMORPHONE HCL 110MG/55ML
0.25 PATIENT CONTROLLED ANALGESIA SYRINGE INTRAVENOUS EVERY 5 MIN PRN
Status: DISCONTINUED | OUTPATIENT
Start: 2019-01-01 | End: 2019-01-01 | Stop reason: HOSPADM

## 2019-01-01 RX ORDER — HYDROMORPHONE HYDROCHLORIDE 1 MG/ML
0.5 INJECTION, SOLUTION INTRAMUSCULAR; INTRAVENOUS; SUBCUTANEOUS EVERY 6 HOURS PRN
Status: DISCONTINUED | OUTPATIENT
Start: 2019-01-01 | End: 2019-01-01 | Stop reason: HOSPADM

## 2019-01-01 RX ORDER — FENTANYL CITRATE 50 UG/ML
INJECTION, SOLUTION INTRAMUSCULAR; INTRAVENOUS
Status: COMPLETED | OUTPATIENT
Start: 2019-01-01 | End: 2019-01-01

## 2019-01-01 RX ORDER — VENLAFAXINE HYDROCHLORIDE 150 MG/1
150 CAPSULE, EXTENDED RELEASE ORAL
Status: DISCONTINUED | OUTPATIENT
Start: 2019-01-01 | End: 2019-01-01 | Stop reason: HOSPADM

## 2019-01-01 RX ORDER — ALPRAZOLAM 0.5 MG/1
1 TABLET ORAL 3 TIMES DAILY PRN
Status: DISCONTINUED | OUTPATIENT
Start: 2019-01-01 | End: 2019-01-01

## 2019-01-01 RX ORDER — IPRATROPIUM BROMIDE AND ALBUTEROL SULFATE 2.5; .5 MG/3ML; MG/3ML
1 SOLUTION RESPIRATORY (INHALATION) EVERY 4 HOURS PRN
Status: DISCONTINUED | OUTPATIENT
Start: 2019-01-01 | End: 2019-01-01 | Stop reason: HOSPADM

## 2019-01-01 RX ORDER — CIPROFLOXACIN 2 MG/ML
INJECTION, SOLUTION INTRAVENOUS
Status: COMPLETED
Start: 2019-01-01 | End: 2019-01-01

## 2019-01-01 RX ORDER — DRONABINOL 2.5 MG/1
5 CAPSULE ORAL 2 TIMES DAILY
Status: DISCONTINUED | OUTPATIENT
Start: 2019-01-01 | End: 2019-01-01 | Stop reason: HOSPADM

## 2019-01-01 RX ORDER — DEXAMETHASONE 2 MG/1
2 TABLET ORAL EVERY 12 HOURS SCHEDULED
Qty: 10 TABLET | Refills: 0 | Status: CANCELLED | OUTPATIENT
Start: 2019-01-01 | End: 2019-01-01

## 2019-01-01 RX ORDER — SENNA PLUS 8.6 MG/1
1 TABLET ORAL NIGHTLY
Status: DISCONTINUED | OUTPATIENT
Start: 2019-01-01 | End: 2019-01-01 | Stop reason: HOSPADM

## 2019-01-01 RX ORDER — ONDANSETRON 2 MG/ML
4 INJECTION INTRAMUSCULAR; INTRAVENOUS ONCE
Status: COMPLETED | OUTPATIENT
Start: 2019-01-01 | End: 2019-01-01

## 2019-01-01 RX ORDER — SODIUM CHLORIDE 0.9 % (FLUSH) 0.9 %
10 SYRINGE (ML) INJECTION PRN
Status: DISCONTINUED | OUTPATIENT
Start: 2019-01-01 | End: 2019-01-01 | Stop reason: HOSPADM

## 2019-01-01 RX ORDER — HYDROMORPHONE HYDROCHLORIDE 1 MG/ML
0.5 INJECTION, SOLUTION INTRAMUSCULAR; INTRAVENOUS; SUBCUTANEOUS EVERY 4 HOURS PRN
Status: DISCONTINUED | OUTPATIENT
Start: 2019-01-01 | End: 2019-01-01 | Stop reason: HOSPADM

## 2019-01-01 RX ORDER — VANCOMYCIN HYDROCHLORIDE 1 G/200ML
15 INJECTION, SOLUTION INTRAVENOUS ONCE
Status: DISCONTINUED | OUTPATIENT
Start: 2019-01-01 | End: 2019-01-01 | Stop reason: SDUPTHER

## 2019-01-01 RX ORDER — SODIUM CHLORIDE 0.9 % (FLUSH) 0.9 %
10 SYRINGE (ML) INJECTION EVERY 12 HOURS SCHEDULED
Status: DISCONTINUED | OUTPATIENT
Start: 2019-01-01 | End: 2019-01-01 | Stop reason: HOSPADM

## 2019-01-01 RX ORDER — HYDROMORPHONE HYDROCHLORIDE 1 MG/ML
1 INJECTION, SOLUTION INTRAMUSCULAR; INTRAVENOUS; SUBCUTANEOUS ONCE
Status: COMPLETED | OUTPATIENT
Start: 2019-01-01 | End: 2019-01-01

## 2019-01-01 RX ORDER — ACETAMINOPHEN 325 MG/1
650 TABLET ORAL EVERY 8 HOURS PRN
Status: DISCONTINUED | OUTPATIENT
Start: 2019-01-01 | End: 2019-01-01 | Stop reason: HOSPADM

## 2019-01-01 RX ORDER — ASPIRIN 81 MG/1
81 TABLET, CHEWABLE ORAL DAILY
Status: DISCONTINUED | OUTPATIENT
Start: 2019-01-01 | End: 2019-01-01 | Stop reason: HOSPADM

## 2019-01-01 RX ORDER — 0.9 % SODIUM CHLORIDE 0.9 %
30 INTRAVENOUS SOLUTION INTRAVENOUS ONCE
Status: COMPLETED | OUTPATIENT
Start: 2019-01-01 | End: 2019-01-01

## 2019-01-01 RX ORDER — ATORVASTATIN CALCIUM 20 MG/1
20 TABLET, FILM COATED ORAL NIGHTLY
Status: DISCONTINUED | OUTPATIENT
Start: 2019-01-01 | End: 2019-01-01 | Stop reason: HOSPADM

## 2019-01-01 RX ORDER — MORPHINE SULFATE 15 MG/1
30 TABLET, FILM COATED, EXTENDED RELEASE ORAL 3 TIMES DAILY
Status: CANCELLED | OUTPATIENT
Start: 2019-01-01

## 2019-01-01 RX ORDER — FUROSEMIDE 10 MG/ML
40 INJECTION INTRAMUSCULAR; INTRAVENOUS ONCE
Status: DISCONTINUED | OUTPATIENT
Start: 2019-01-01 | End: 2019-01-01

## 2019-01-01 RX ORDER — ARIPIPRAZOLE 5 MG/1
10 TABLET ORAL DAILY
Status: DISCONTINUED | OUTPATIENT
Start: 2019-01-01 | End: 2019-01-01 | Stop reason: HOSPADM

## 2019-01-01 RX ORDER — ATORVASTATIN CALCIUM 20 MG/1
20 TABLET, FILM COATED ORAL NIGHTLY
Status: DISCONTINUED | OUTPATIENT
Start: 2019-01-01 | End: 2019-01-01

## 2019-01-01 RX ORDER — ONDANSETRON 2 MG/ML
4 INJECTION INTRAMUSCULAR; INTRAVENOUS
Status: COMPLETED | OUTPATIENT
Start: 2019-01-01 | End: 2019-01-01

## 2019-01-01 RX ORDER — MORPHINE SULFATE 4 MG/ML
4 INJECTION, SOLUTION INTRAMUSCULAR; INTRAVENOUS
Status: CANCELLED | OUTPATIENT
Start: 2019-01-01

## 2019-01-01 RX ORDER — 0.9 % SODIUM CHLORIDE 0.9 %
1000 INTRAVENOUS SOLUTION INTRAVENOUS ONCE
Status: COMPLETED | OUTPATIENT
Start: 2019-01-01 | End: 2019-01-01

## 2019-01-01 RX ORDER — PROMETHAZINE HYDROCHLORIDE 25 MG/ML
12.5 INJECTION, SOLUTION INTRAMUSCULAR; INTRAVENOUS EVERY 6 HOURS PRN
Status: DISCONTINUED | OUTPATIENT
Start: 2019-01-01 | End: 2019-01-01 | Stop reason: HOSPADM

## 2019-01-01 RX ORDER — PROPOFOL 10 MG/ML
INJECTION, EMULSION INTRAVENOUS PRN
Status: DISCONTINUED | OUTPATIENT
Start: 2019-01-01 | End: 2019-01-01 | Stop reason: SDUPTHER

## 2019-01-01 RX ORDER — LOPERAMIDE HYDROCHLORIDE 2 MG/1
4 CAPSULE ORAL ONCE
Status: COMPLETED | OUTPATIENT
Start: 2019-01-01 | End: 2019-01-01

## 2019-01-01 RX ORDER — MORPHINE SULFATE 30 MG/1
30 TABLET, FILM COATED, EXTENDED RELEASE ORAL 3 TIMES DAILY
COMMUNITY

## 2019-01-01 RX ORDER — LIDOCAINE HYDROCHLORIDE 20 MG/ML
INJECTION, SOLUTION EPIDURAL; INFILTRATION; INTRACAUDAL; PERINEURAL PRN
Status: DISCONTINUED | OUTPATIENT
Start: 2019-01-01 | End: 2019-01-01 | Stop reason: SDUPTHER

## 2019-01-01 RX ORDER — DOXYCYCLINE HYCLATE 100 MG
100 TABLET ORAL 2 TIMES DAILY
Qty: 14 TABLET | Refills: 0 | Status: SHIPPED | OUTPATIENT
Start: 2019-01-01 | End: 2019-01-01

## 2019-01-01 RX ORDER — TAMSULOSIN HYDROCHLORIDE 0.4 MG/1
0.4 CAPSULE ORAL NIGHTLY
Status: DISCONTINUED | OUTPATIENT
Start: 2019-01-01 | End: 2019-01-01 | Stop reason: HOSPADM

## 2019-01-01 RX ORDER — DEXAMETHASONE SODIUM PHOSPHATE 4 MG/ML
4 INJECTION, SOLUTION INTRA-ARTICULAR; INTRALESIONAL; INTRAMUSCULAR; INTRAVENOUS; SOFT TISSUE DAILY
Status: DISCONTINUED | OUTPATIENT
Start: 2019-01-01 | End: 2019-01-01 | Stop reason: HOSPADM

## 2019-01-01 RX ORDER — LIDOCAINE HYDROCHLORIDE 10 MG/ML
0.5 INJECTION, SOLUTION EPIDURAL; INFILTRATION; INTRACAUDAL; PERINEURAL ONCE
Status: DISCONTINUED | OUTPATIENT
Start: 2019-01-01 | End: 2019-01-01 | Stop reason: HOSPADM

## 2019-01-01 RX ORDER — ONDANSETRON 4 MG/1
8 TABLET, ORALLY DISINTEGRATING ORAL
Status: DISCONTINUED | OUTPATIENT
Start: 2019-01-01 | End: 2019-01-01 | Stop reason: HOSPADM

## 2019-01-01 RX ORDER — LORAZEPAM 2 MG/ML
0.5 INJECTION INTRAMUSCULAR EVERY 8 HOURS PRN
Status: DISCONTINUED | OUTPATIENT
Start: 2019-01-01 | End: 2019-01-01 | Stop reason: HOSPADM

## 2019-01-01 RX ORDER — MAGNESIUM SULFATE 1 G/100ML
1 INJECTION INTRAVENOUS PRN
Status: DISCONTINUED | OUTPATIENT
Start: 2019-01-01 | End: 2019-01-01 | Stop reason: HOSPADM

## 2019-01-01 RX ORDER — LIDOCAINE HYDROCHLORIDE 10 MG/ML
10 INJECTION, SOLUTION EPIDURAL; INFILTRATION; INTRACAUDAL; PERINEURAL ONCE
Status: COMPLETED | OUTPATIENT
Start: 2019-01-01 | End: 2019-01-01

## 2019-01-01 RX ORDER — ONDANSETRON 8 MG/1
8 TABLET, ORALLY DISINTEGRATING ORAL EVERY 12 HOURS PRN
Status: DISCONTINUED | OUTPATIENT
Start: 2019-01-01 | End: 2019-01-01 | Stop reason: HOSPADM

## 2019-01-01 RX ORDER — ONDANSETRON 2 MG/ML
4 INJECTION INTRAMUSCULAR; INTRAVENOUS EVERY 6 HOURS PRN
Status: DISCONTINUED | OUTPATIENT
Start: 2019-01-01 | End: 2019-01-01 | Stop reason: HOSPADM

## 2019-01-01 RX ORDER — VANCOMYCIN HYDROCHLORIDE 1 G/200ML
1000 INJECTION, SOLUTION INTRAVENOUS EVERY 12 HOURS
Status: DISCONTINUED | OUTPATIENT
Start: 2019-01-01 | End: 2019-01-01

## 2019-01-01 RX ORDER — SODIUM CHLORIDE 9 MG/ML
INJECTION, SOLUTION INTRAVENOUS CONTINUOUS
Status: DISCONTINUED | OUTPATIENT
Start: 2019-01-01 | End: 2019-01-01 | Stop reason: HOSPADM

## 2019-01-01 RX ORDER — LABETALOL HYDROCHLORIDE 5 MG/ML
5 INJECTION, SOLUTION INTRAVENOUS EVERY 6 HOURS PRN
Status: DISCONTINUED | OUTPATIENT
Start: 2019-01-01 | End: 2019-01-01

## 2019-01-01 RX ORDER — LORAZEPAM 1 MG/1
1 TABLET ORAL EVERY 6 HOURS PRN
Status: DISCONTINUED | OUTPATIENT
Start: 2019-01-01 | End: 2019-01-01 | Stop reason: HOSPADM

## 2019-01-01 RX ORDER — MORPHINE SULFATE 30 MG/1
30 TABLET, FILM COATED, EXTENDED RELEASE ORAL 3 TIMES DAILY
Status: DISCONTINUED | OUTPATIENT
Start: 2019-01-01 | End: 2019-01-01 | Stop reason: HOSPADM

## 2019-01-01 RX ORDER — DRONABINOL 5 MG/1
5 CAPSULE ORAL 2 TIMES DAILY
Qty: 60 CAPSULE | Refills: 0 | Status: SHIPPED | OUTPATIENT
Start: 2019-01-01 | End: 2019-12-13

## 2019-01-01 RX ORDER — PREDNISONE 10 MG/1
60 TABLET ORAL DAILY
Qty: 30 TABLET | Refills: 0 | Status: SHIPPED | OUTPATIENT
Start: 2019-01-01 | End: 2019-01-01

## 2019-01-01 RX ORDER — LORAZEPAM 1 MG/1
1 TABLET ORAL EVERY 6 HOURS PRN
Status: DISCONTINUED | OUTPATIENT
Start: 2019-01-01 | End: 2019-01-01

## 2019-01-01 RX ORDER — FUROSEMIDE 10 MG/ML
20 INJECTION INTRAMUSCULAR; INTRAVENOUS ONCE
Status: COMPLETED | OUTPATIENT
Start: 2019-01-01 | End: 2019-01-01

## 2019-01-01 RX ORDER — HEPARIN SODIUM 200 [USP'U]/100ML
7 INJECTION, SOLUTION INTRAVENOUS CONTINUOUS
Status: ACTIVE | OUTPATIENT
Start: 2019-01-01 | End: 2019-01-01

## 2019-01-01 RX ORDER — SODIUM CHLORIDE, SODIUM LACTATE, POTASSIUM CHLORIDE, CALCIUM CHLORIDE 600; 310; 30; 20 MG/100ML; MG/100ML; MG/100ML; MG/100ML
INJECTION, SOLUTION INTRAVENOUS CONTINUOUS
Status: DISCONTINUED | OUTPATIENT
Start: 2019-01-01 | End: 2019-01-01 | Stop reason: HOSPADM

## 2019-01-01 RX ORDER — CLINDAMYCIN PHOSPHATE 600 MG/50ML
600 INJECTION INTRAVENOUS ONCE
Status: COMPLETED | OUTPATIENT
Start: 2019-01-01 | End: 2019-01-01

## 2019-01-01 RX ORDER — IPRATROPIUM BROMIDE AND ALBUTEROL SULFATE 2.5; .5 MG/3ML; MG/3ML
1 SOLUTION RESPIRATORY (INHALATION) ONCE
Status: COMPLETED | OUTPATIENT
Start: 2019-01-01 | End: 2019-01-01

## 2019-01-01 RX ORDER — MORPHINE SULFATE 15 MG/1
TABLET, FILM COATED, EXTENDED RELEASE ORAL
Status: COMPLETED
Start: 2019-01-01 | End: 2019-01-01

## 2019-01-01 RX ORDER — PROCHLORPERAZINE MALEATE 5 MG/1
5 TABLET ORAL EVERY 6 HOURS PRN
Status: DISCONTINUED | OUTPATIENT
Start: 2019-01-01 | End: 2019-01-01 | Stop reason: HOSPADM

## 2019-01-01 RX ORDER — ASPIRIN 81 MG/1
81 TABLET, CHEWABLE ORAL DAILY
Status: DISCONTINUED | OUTPATIENT
Start: 2019-01-01 | End: 2019-01-01 | Stop reason: SDUPTHER

## 2019-01-01 RX ORDER — ARIPIPRAZOLE 10 MG/1
TABLET ORAL
Qty: 30 TABLET | Refills: 0 | Status: SHIPPED | OUTPATIENT
Start: 2019-01-01

## 2019-01-01 RX ORDER — ALPRAZOLAM 1 MG/1
1 TABLET ORAL 3 TIMES DAILY PRN
COMMUNITY
End: 2019-01-01

## 2019-01-01 RX ORDER — PROMETHAZINE HYDROCHLORIDE 25 MG/ML
12.5 INJECTION, SOLUTION INTRAMUSCULAR; INTRAVENOUS EVERY 6 HOURS PRN
Status: DISCONTINUED | OUTPATIENT
Start: 2019-01-01 | End: 2019-01-01

## 2019-01-01 RX ORDER — OXYCODONE HYDROCHLORIDE 5 MG/1
5 TABLET ORAL PRN
Status: DISCONTINUED | OUTPATIENT
Start: 2019-01-01 | End: 2019-01-01 | Stop reason: HOSPADM

## 2019-01-01 RX ORDER — DEXAMETHASONE SODIUM PHOSPHATE 4 MG/ML
8 INJECTION, SOLUTION INTRA-ARTICULAR; INTRALESIONAL; INTRAMUSCULAR; INTRAVENOUS; SOFT TISSUE DAILY
Status: DISCONTINUED | OUTPATIENT
Start: 2019-01-01 | End: 2019-01-01

## 2019-01-01 RX ORDER — VENLAFAXINE HYDROCHLORIDE 150 MG/1
CAPSULE, EXTENDED RELEASE ORAL
Qty: 60 CAPSULE | Refills: 5 | Status: SHIPPED | OUTPATIENT
Start: 2019-01-01

## 2019-01-01 RX ORDER — FENTANYL CITRATE 50 UG/ML
INJECTION, SOLUTION INTRAMUSCULAR; INTRAVENOUS PRN
Status: DISCONTINUED | OUTPATIENT
Start: 2019-01-01 | End: 2019-01-01 | Stop reason: SDUPTHER

## 2019-01-01 RX ORDER — OXYCODONE HYDROCHLORIDE 5 MG/1
10 TABLET ORAL PRN
Status: DISCONTINUED | OUTPATIENT
Start: 2019-01-01 | End: 2019-01-01 | Stop reason: HOSPADM

## 2019-01-01 RX ORDER — DEXAMETHASONE 4 MG/1
2 TABLET ORAL EVERY 12 HOURS SCHEDULED
Status: DISCONTINUED | OUTPATIENT
Start: 2019-01-01 | End: 2019-01-01 | Stop reason: HOSPADM

## 2019-01-01 RX ORDER — ALPRAZOLAM 0.5 MG/1
1 TABLET ORAL 3 TIMES DAILY PRN
Status: CANCELLED | OUTPATIENT
Start: 2019-01-01

## 2019-01-01 RX ORDER — PREDNISONE 10 MG/1
TABLET ORAL
Qty: 30 TABLET | Refills: 0 | Status: SHIPPED | OUTPATIENT
Start: 2019-01-01 | End: 2019-01-01

## 2019-01-01 RX ORDER — MIDAZOLAM HYDROCHLORIDE 1 MG/ML
INJECTION INTRAMUSCULAR; INTRAVENOUS
Status: COMPLETED | OUTPATIENT
Start: 2019-01-01 | End: 2019-01-01

## 2019-01-01 RX ORDER — ONDANSETRON 2 MG/ML
4 INJECTION INTRAMUSCULAR; INTRAVENOUS EVERY 6 HOURS PRN
Status: DISCONTINUED | OUTPATIENT
Start: 2019-01-01 | End: 2019-01-01

## 2019-01-01 RX ORDER — ACETAMINOPHEN 500 MG
500 TABLET ORAL ONCE
Status: COMPLETED | OUTPATIENT
Start: 2019-01-01 | End: 2019-01-01

## 2019-01-01 RX ORDER — OXYCODONE AND ACETAMINOPHEN 10; 325 MG/1; MG/1
1 TABLET ORAL EVERY 6 HOURS PRN
Status: DISCONTINUED | OUTPATIENT
Start: 2019-01-01 | End: 2019-01-01 | Stop reason: ALTCHOICE

## 2019-01-01 RX ORDER — MORPHINE SULFATE 30 MG/1
30 TABLET, FILM COATED, EXTENDED RELEASE ORAL 3 TIMES DAILY
Status: DISCONTINUED | OUTPATIENT
Start: 2019-01-01 | End: 2019-01-01

## 2019-01-01 RX ORDER — ARIPIPRAZOLE 10 MG/1
1 TABLET ORAL DAILY
Status: DISCONTINUED | OUTPATIENT
Start: 2019-01-01 | End: 2019-01-01 | Stop reason: SDUPTHER

## 2019-01-01 RX ORDER — MEPERIDINE HYDROCHLORIDE 25 MG/ML
12.5 INJECTION INTRAMUSCULAR; INTRAVENOUS; SUBCUTANEOUS EVERY 5 MIN PRN
Status: DISCONTINUED | OUTPATIENT
Start: 2019-01-01 | End: 2019-01-01 | Stop reason: HOSPADM

## 2019-01-01 RX ORDER — LORAZEPAM 1 MG/1
1 TABLET ORAL EVERY 6 HOURS PRN
COMMUNITY

## 2019-01-01 RX ORDER — LIDOCAINE HYDROCHLORIDE 10 MG/ML
5 INJECTION, SOLUTION EPIDURAL; INFILTRATION; INTRACAUDAL; PERINEURAL ONCE
Status: COMPLETED | OUTPATIENT
Start: 2019-01-01 | End: 2019-01-01

## 2019-01-01 RX ORDER — CIPROFLOXACIN 500 MG/1
500 TABLET, FILM COATED ORAL EVERY 12 HOURS SCHEDULED
Status: DISCONTINUED | OUTPATIENT
Start: 2019-01-01 | End: 2019-01-01 | Stop reason: HOSPADM

## 2019-01-01 RX ORDER — PROPOFOL 10 MG/ML
INJECTION, EMULSION INTRAVENOUS CONTINUOUS PRN
Status: DISCONTINUED | OUTPATIENT
Start: 2019-01-01 | End: 2019-01-01 | Stop reason: SDUPTHER

## 2019-01-01 RX ORDER — PREDNISONE 10 MG/1
5 TABLET ORAL 2 TIMES DAILY
Status: CANCELLED | OUTPATIENT
Start: 2019-01-01

## 2019-01-01 RX ORDER — PROCHLORPERAZINE EDISYLATE 5 MG/ML
5 INJECTION INTRAMUSCULAR; INTRAVENOUS
Status: DISCONTINUED | OUTPATIENT
Start: 2019-01-01 | End: 2019-01-01 | Stop reason: HOSPADM

## 2019-01-01 RX ORDER — OXYCODONE AND ACETAMINOPHEN 10; 325 MG/1; MG/1
1 TABLET ORAL EVERY 4 HOURS PRN
Status: DISCONTINUED | OUTPATIENT
Start: 2019-01-01 | End: 2019-01-01

## 2019-01-01 RX ORDER — ERGOCALCIFEROL 1.25 MG/1
50000 CAPSULE ORAL WEEKLY
Status: DISCONTINUED | OUTPATIENT
Start: 2019-01-01 | End: 2019-01-01 | Stop reason: HOSPADM

## 2019-01-01 RX ORDER — ARIPIPRAZOLE 10 MG/1
1 TABLET ORAL DAILY
Status: CANCELLED | OUTPATIENT
Start: 2019-01-01

## 2019-01-01 RX ORDER — PROMETHAZINE HYDROCHLORIDE 25 MG/ML
12.5 INJECTION, SOLUTION INTRAMUSCULAR; INTRAVENOUS EVERY 30 MIN PRN
Status: DISCONTINUED | OUTPATIENT
Start: 2019-01-01 | End: 2019-01-01 | Stop reason: HOSPADM

## 2019-01-01 RX ORDER — ASPIRIN 81 MG/1
81 TABLET, CHEWABLE ORAL DAILY
Status: DISCONTINUED | OUTPATIENT
Start: 2019-01-01 | End: 2019-01-01 | Stop reason: CLARIF

## 2019-01-01 RX ORDER — OXYCODONE AND ACETAMINOPHEN 10; 325 MG/1; MG/1
1 TABLET ORAL EVERY 6 HOURS PRN
Status: CANCELLED | OUTPATIENT
Start: 2019-01-01

## 2019-01-01 RX ORDER — ASPIRIN 81 MG/1
81 TABLET ORAL DAILY
Status: DISCONTINUED | OUTPATIENT
Start: 2019-01-01 | End: 2019-01-01 | Stop reason: HOSPADM

## 2019-01-01 RX ORDER — POTASSIUM CHLORIDE 750 MG/1
20 TABLET, FILM COATED, EXTENDED RELEASE ORAL DAILY
Status: DISCONTINUED | OUTPATIENT
Start: 2019-01-01 | End: 2019-01-01 | Stop reason: HOSPADM

## 2019-01-01 RX ORDER — ALPRAZOLAM 0.5 MG/1
0.5 TABLET ORAL 3 TIMES DAILY PRN
Status: DISCONTINUED | OUTPATIENT
Start: 2019-01-01 | End: 2019-01-01 | Stop reason: HOSPADM

## 2019-01-01 RX ORDER — PROMETHAZINE HYDROCHLORIDE 25 MG/1
25 TABLET ORAL EVERY 6 HOURS PRN
Qty: 20 TABLET | Refills: 0 | Status: SHIPPED | OUTPATIENT
Start: 2019-01-01 | End: 2019-01-01

## 2019-01-01 RX ORDER — LIDOCAINE HYDROCHLORIDE 10 MG/ML
1 INJECTION, SOLUTION EPIDURAL; INFILTRATION; INTRACAUDAL; PERINEURAL
Status: DISCONTINUED | OUTPATIENT
Start: 2019-01-01 | End: 2019-01-01 | Stop reason: HOSPADM

## 2019-01-01 RX ORDER — MORPHINE SULFATE 2 MG/ML
2 INJECTION, SOLUTION INTRAMUSCULAR; INTRAVENOUS
Status: CANCELLED | OUTPATIENT
Start: 2019-01-01

## 2019-01-01 RX ORDER — BACITRACIN ZINC AND POLYMYXIN B SULFATE 500; 1000 [USP'U]/G; [USP'U]/G
OINTMENT TOPICAL ONCE
Status: COMPLETED | OUTPATIENT
Start: 2019-01-01 | End: 2019-01-01

## 2019-01-01 RX ORDER — HYDROMORPHONE HCL 110MG/55ML
0.5 PATIENT CONTROLLED ANALGESIA SYRINGE INTRAVENOUS EVERY 5 MIN PRN
Status: DISCONTINUED | OUTPATIENT
Start: 2019-01-01 | End: 2019-01-01 | Stop reason: HOSPADM

## 2019-01-01 RX ORDER — DRONABINOL 5 MG/1
5 CAPSULE ORAL 2 TIMES DAILY
Qty: 60 CAPSULE | Refills: 0 | Status: SHIPPED | OUTPATIENT
Start: 2019-01-01 | End: 2019-01-01

## 2019-01-01 RX ORDER — METHYLPREDNISOLONE SODIUM SUCCINATE 125 MG/2ML
125 INJECTION, POWDER, LYOPHILIZED, FOR SOLUTION INTRAMUSCULAR; INTRAVENOUS ONCE
Status: COMPLETED | OUTPATIENT
Start: 2019-01-01 | End: 2019-01-01

## 2019-01-01 RX ORDER — MAGNESIUM HYDROXIDE 1200 MG/15ML
LIQUID ORAL
Status: COMPLETED | OUTPATIENT
Start: 2019-01-01 | End: 2019-01-01

## 2019-01-01 RX ORDER — DIPHENHYDRAMINE HCL 25 MG
25 TABLET ORAL EVERY 8 HOURS PRN
Status: DISCONTINUED | OUTPATIENT
Start: 2019-01-01 | End: 2019-01-01 | Stop reason: HOSPADM

## 2019-01-01 RX ORDER — PANTOPRAZOLE SODIUM 40 MG/1
40 TABLET, DELAYED RELEASE ORAL DAILY
COMMUNITY

## 2019-01-01 RX ORDER — NICOTINE 21 MG/24HR
1 PATCH, TRANSDERMAL 24 HOURS TRANSDERMAL DAILY
Status: DISCONTINUED | OUTPATIENT
Start: 2019-01-01 | End: 2019-01-01

## 2019-01-01 RX ORDER — TRAZODONE HYDROCHLORIDE 50 MG/1
50 TABLET ORAL NIGHTLY PRN
Status: DISCONTINUED | OUTPATIENT
Start: 2019-01-01 | End: 2019-01-01 | Stop reason: HOSPADM

## 2019-01-01 RX ORDER — CIPROFLOXACIN 2 MG/ML
400 INJECTION, SOLUTION INTRAVENOUS
Status: COMPLETED | OUTPATIENT
Start: 2019-01-01 | End: 2019-01-01

## 2019-01-01 RX ORDER — OXYCODONE AND ACETAMINOPHEN 10; 325 MG/1; MG/1
1 TABLET ORAL
COMMUNITY

## 2019-01-01 RX ORDER — DIPHENHYDRAMINE HYDROCHLORIDE 50 MG/ML
12.5 INJECTION INTRAMUSCULAR; INTRAVENOUS
Status: DISCONTINUED | OUTPATIENT
Start: 2019-01-01 | End: 2019-01-01 | Stop reason: HOSPADM

## 2019-01-01 RX ORDER — PROMETHAZINE HYDROCHLORIDE 25 MG/ML
12.5 INJECTION, SOLUTION INTRAMUSCULAR; INTRAVENOUS ONCE
Status: COMPLETED | OUTPATIENT
Start: 2019-01-01 | End: 2019-01-01

## 2019-01-01 RX ORDER — ONDANSETRON 2 MG/ML
8 INJECTION INTRAMUSCULAR; INTRAVENOUS EVERY 6 HOURS PRN
Status: DISCONTINUED | OUTPATIENT
Start: 2019-01-01 | End: 2019-01-01 | Stop reason: HOSPADM

## 2019-01-01 RX ORDER — DRONABINOL 2.5 MG/1
5 CAPSULE ORAL
Status: DISCONTINUED | OUTPATIENT
Start: 2019-01-01 | End: 2019-01-01 | Stop reason: HOSPADM

## 2019-01-01 RX ORDER — ONDANSETRON 2 MG/ML
4 INJECTION INTRAMUSCULAR; INTRAVENOUS EVERY 6 HOURS PRN
Status: DISCONTINUED | OUTPATIENT
Start: 2019-01-01 | End: 2019-01-01 | Stop reason: SDUPTHER

## 2019-01-01 RX ORDER — 0.9 % SODIUM CHLORIDE 0.9 %
250 INTRAVENOUS SOLUTION INTRAVENOUS ONCE
Status: COMPLETED | OUTPATIENT
Start: 2019-01-01 | End: 2019-01-01

## 2019-01-01 RX ORDER — LIDOCAINE HYDROCHLORIDE AND EPINEPHRINE 10; 10 MG/ML; UG/ML
INJECTION, SOLUTION INFILTRATION; PERINEURAL
Status: COMPLETED | OUTPATIENT
Start: 2019-01-01 | End: 2019-01-01

## 2019-01-01 RX ORDER — LIDOCAINE HYDROCHLORIDE AND EPINEPHRINE 10; 10 MG/ML; UG/ML
7.5 INJECTION, SOLUTION INFILTRATION; PERINEURAL ONCE
Status: COMPLETED | OUTPATIENT
Start: 2019-01-01 | End: 2019-01-01

## 2019-01-01 RX ORDER — CIPROFLOXACIN 2 MG/ML
400 INJECTION, SOLUTION INTRAVENOUS ONCE
Status: COMPLETED | OUTPATIENT
Start: 2019-01-01 | End: 2019-01-01

## 2019-01-01 RX ORDER — ONDANSETRON 2 MG/ML
8 INJECTION INTRAMUSCULAR; INTRAVENOUS ONCE
Status: COMPLETED | OUTPATIENT
Start: 2019-01-01 | End: 2019-01-01

## 2019-01-01 RX ORDER — POTASSIUM CHLORIDE 20 MEQ/1
40 TABLET, EXTENDED RELEASE ORAL PRN
Status: DISCONTINUED | OUTPATIENT
Start: 2019-01-01 | End: 2019-01-01 | Stop reason: HOSPADM

## 2019-01-01 RX ORDER — HYDROMORPHONE HYDROCHLORIDE 1 MG/ML
1 INJECTION, SOLUTION INTRAMUSCULAR; INTRAVENOUS; SUBCUTANEOUS
Status: DISCONTINUED | OUTPATIENT
Start: 2019-01-01 | End: 2019-01-01

## 2019-01-01 RX ORDER — VENLAFAXINE HYDROCHLORIDE 150 MG/1
300 CAPSULE, EXTENDED RELEASE ORAL
Status: DISCONTINUED | OUTPATIENT
Start: 2019-01-01 | End: 2019-01-01 | Stop reason: HOSPADM

## 2019-01-01 RX ORDER — THIAMINE HCL 100 MG
100 TABLET ORAL DAILY
Status: DISCONTINUED | OUTPATIENT
Start: 2019-01-01 | End: 2019-01-01 | Stop reason: HOSPADM

## 2019-01-01 RX ORDER — OXYCODONE AND ACETAMINOPHEN 10; 325 MG/1; MG/1
1 TABLET ORAL EVERY 8 HOURS PRN
Status: DISCONTINUED | OUTPATIENT
Start: 2019-01-01 | End: 2019-01-01 | Stop reason: HOSPADM

## 2019-01-01 RX ORDER — DRONABINOL 5 MG/1
5 CAPSULE ORAL 2 TIMES DAILY
Qty: 60 CAPSULE | Refills: 0 | Status: ON HOLD | OUTPATIENT
Start: 2019-01-01 | End: 2019-01-01

## 2019-01-01 RX ORDER — LORAZEPAM 1 MG/1
0.5 TABLET ORAL EVERY 8 HOURS PRN
Status: DISCONTINUED | OUTPATIENT
Start: 2019-01-01 | End: 2019-01-01 | Stop reason: HOSPADM

## 2019-01-01 RX ORDER — POLYETHYLENE GLYCOL 3350 17 G/17G
17 POWDER, FOR SOLUTION ORAL DAILY PRN
Status: DISCONTINUED | OUTPATIENT
Start: 2019-01-01 | End: 2019-01-01 | Stop reason: HOSPADM

## 2019-01-01 RX ORDER — POLYETHYLENE GLYCOL 3350 17 G/17G
17 POWDER, FOR SOLUTION ORAL DAILY
Status: DISCONTINUED | OUTPATIENT
Start: 2019-01-01 | End: 2019-01-01 | Stop reason: HOSPADM

## 2019-01-01 RX ORDER — HYDROMORPHONE HYDROCHLORIDE 1 MG/ML
0.25 INJECTION, SOLUTION INTRAMUSCULAR; INTRAVENOUS; SUBCUTANEOUS EVERY 6 HOURS PRN
Status: DISCONTINUED | OUTPATIENT
Start: 2019-01-01 | End: 2019-01-01 | Stop reason: HOSPADM

## 2019-01-01 RX ORDER — MORPHINE SULFATE 30 MG/1
30 TABLET, FILM COATED, EXTENDED RELEASE ORAL EVERY 12 HOURS SCHEDULED
Status: DISCONTINUED | OUTPATIENT
Start: 2019-01-01 | End: 2019-01-01 | Stop reason: HOSPADM

## 2019-01-01 RX ORDER — PROCHLORPERAZINE MALEATE 5 MG/1
5 TABLET ORAL EVERY 6 HOURS PRN
COMMUNITY

## 2019-01-01 RX ORDER — VENLAFAXINE HYDROCHLORIDE 150 MG/1
CAPSULE, EXTENDED RELEASE ORAL
Qty: 180 CAPSULE | Refills: 1 | Status: SHIPPED | OUTPATIENT
Start: 2019-01-01 | End: 2019-01-01 | Stop reason: SDUPTHER

## 2019-01-01 RX ORDER — FENTANYL CITRATE 50 UG/ML
50 INJECTION, SOLUTION INTRAMUSCULAR; INTRAVENOUS EVERY 5 MIN PRN
Status: DISCONTINUED | OUTPATIENT
Start: 2019-01-01 | End: 2019-01-01 | Stop reason: HOSPADM

## 2019-01-01 RX ORDER — DIAPER,BRIEF,INFANT-TODD,DISP
EACH MISCELLANEOUS 2 TIMES DAILY
Status: DISCONTINUED | OUTPATIENT
Start: 2019-01-01 | End: 2019-01-01 | Stop reason: HOSPADM

## 2019-01-01 RX ORDER — PREDNISONE 20 MG/1
60 TABLET ORAL ONCE
Status: COMPLETED | OUTPATIENT
Start: 2019-01-01 | End: 2019-01-01

## 2019-01-01 RX ORDER — SUCCINYLCHOLINE CHLORIDE 20 MG/ML
INJECTION INTRAMUSCULAR; INTRAVENOUS PRN
Status: DISCONTINUED | OUTPATIENT
Start: 2019-01-01 | End: 2019-01-01 | Stop reason: SDUPTHER

## 2019-01-01 RX ORDER — CIPROFLOXACIN 500 MG/1
500 TABLET, FILM COATED ORAL EVERY 12 HOURS SCHEDULED
Qty: 14 TABLET | Refills: 0 | Status: SHIPPED | OUTPATIENT
Start: 2019-01-01 | End: 2019-01-01 | Stop reason: HOSPADM

## 2019-01-01 RX ORDER — ONDANSETRON 2 MG/ML
INJECTION INTRAMUSCULAR; INTRAVENOUS
Status: COMPLETED
Start: 2019-01-01 | End: 2019-01-01

## 2019-01-01 RX ORDER — DIPHENHYDRAMINE HCL 25 MG
25 TABLET ORAL EVERY 6 HOURS PRN
Status: DISCONTINUED | OUTPATIENT
Start: 2019-01-01 | End: 2019-01-01 | Stop reason: HOSPADM

## 2019-01-01 RX ORDER — DRONABINOL 2.5 MG/1
2.5 CAPSULE ORAL 2 TIMES DAILY
Status: DISCONTINUED | OUTPATIENT
Start: 2019-01-01 | End: 2019-01-01

## 2019-01-01 RX ORDER — ARIPIPRAZOLE 10 MG/1
TABLET ORAL
Qty: 90 TABLET | Refills: 1 | Status: ON HOLD | OUTPATIENT
Start: 2019-01-01 | End: 2019-01-01 | Stop reason: HOSPADM

## 2019-01-01 RX ORDER — FENTANYL CITRATE 50 UG/ML
25 INJECTION, SOLUTION INTRAMUSCULAR; INTRAVENOUS EVERY 5 MIN PRN
Status: DISCONTINUED | OUTPATIENT
Start: 2019-01-01 | End: 2019-01-01 | Stop reason: HOSPADM

## 2019-01-01 RX ORDER — ONDANSETRON 2 MG/ML
4 INJECTION INTRAMUSCULAR; INTRAVENOUS EVERY 30 MIN PRN
Status: DISCONTINUED | OUTPATIENT
Start: 2019-01-01 | End: 2019-01-01

## 2019-01-01 RX ORDER — SODIUM CHLORIDE, SODIUM LACTATE, POTASSIUM CHLORIDE, CALCIUM CHLORIDE 600; 310; 30; 20 MG/100ML; MG/100ML; MG/100ML; MG/100ML
1000 INJECTION, SOLUTION INTRAVENOUS ONCE
Status: COMPLETED | OUTPATIENT
Start: 2019-01-01 | End: 2019-01-01

## 2019-01-01 RX ORDER — ATORVASTATIN CALCIUM 20 MG/1
TABLET, FILM COATED ORAL
Qty: 90 TABLET | Refills: 3 | Status: SHIPPED | OUTPATIENT
Start: 2019-01-01

## 2019-01-01 RX ORDER — HYDRALAZINE HYDROCHLORIDE 20 MG/ML
5 INJECTION INTRAMUSCULAR; INTRAVENOUS EVERY 10 MIN PRN
Status: DISCONTINUED | OUTPATIENT
Start: 2019-01-01 | End: 2019-01-01 | Stop reason: HOSPADM

## 2019-01-01 RX ORDER — ATORVASTATIN CALCIUM 10 MG/1
10 TABLET, FILM COATED ORAL NIGHTLY
Status: DISCONTINUED | OUTPATIENT
Start: 2019-01-01 | End: 2019-01-01 | Stop reason: HOSPADM

## 2019-01-01 RX ORDER — SODIUM CHLORIDE 9 MG/ML
INJECTION, SOLUTION INTRAVENOUS CONTINUOUS
Status: DISCONTINUED | OUTPATIENT
Start: 2019-01-01 | End: 2019-01-01

## 2019-01-01 RX ORDER — LABETALOL HYDROCHLORIDE 5 MG/ML
5 INJECTION, SOLUTION INTRAVENOUS EVERY 10 MIN PRN
Status: DISCONTINUED | OUTPATIENT
Start: 2019-01-01 | End: 2019-01-01 | Stop reason: HOSPADM

## 2019-01-01 RX ORDER — MORPHINE SULFATE 15 MG/1
30 TABLET, FILM COATED, EXTENDED RELEASE ORAL 3 TIMES DAILY
Status: DISCONTINUED | OUTPATIENT
Start: 2019-01-01 | End: 2019-01-01 | Stop reason: HOSPADM

## 2019-01-01 RX ORDER — ARIPIPRAZOLE 10 MG/1
TABLET ORAL
Qty: 90 TABLET | Refills: 1 | Status: ON HOLD | OUTPATIENT
Start: 2019-01-01 | End: 2019-01-01 | Stop reason: SDUPTHER

## 2019-01-01 RX ORDER — KETOROLAC TROMETHAMINE 30 MG/ML
15 INJECTION, SOLUTION INTRAMUSCULAR; INTRAVENOUS ONCE
Status: COMPLETED | OUTPATIENT
Start: 2019-01-01 | End: 2019-01-01

## 2019-01-01 RX ORDER — OXYCODONE HYDROCHLORIDE AND ACETAMINOPHEN 5; 325 MG/1; MG/1
1-2 TABLET ORAL EVERY 6 HOURS PRN
Qty: 20 TABLET | Refills: 0 | Status: SHIPPED | OUTPATIENT
Start: 2019-01-01 | End: 2019-01-01

## 2019-01-01 RX ORDER — BUPIVACAINE HYDROCHLORIDE AND EPINEPHRINE 2.5; 5 MG/ML; UG/ML
7.5 INJECTION, SOLUTION EPIDURAL; INFILTRATION; INTRACAUDAL; PERINEURAL ONCE
Status: COMPLETED | OUTPATIENT
Start: 2019-01-01 | End: 2019-01-01

## 2019-01-01 RX ORDER — LORAZEPAM 0.5 MG/1
0.5 TABLET ORAL 3 TIMES DAILY PRN
Status: DISCONTINUED | OUTPATIENT
Start: 2019-01-01 | End: 2019-01-01 | Stop reason: HOSPADM

## 2019-01-01 RX ORDER — PANTOPRAZOLE SODIUM 40 MG/10ML
40 INJECTION, POWDER, LYOPHILIZED, FOR SOLUTION INTRAVENOUS DAILY
Status: DISCONTINUED | OUTPATIENT
Start: 2019-01-01 | End: 2019-01-01 | Stop reason: HOSPADM

## 2019-01-01 RX ORDER — OXYCODONE HYDROCHLORIDE AND ACETAMINOPHEN 5; 325 MG/1; MG/1
2 TABLET ORAL EVERY 4 HOURS PRN
Status: DISCONTINUED | OUTPATIENT
Start: 2019-01-01 | End: 2019-01-01

## 2019-01-01 RX ORDER — VANCOMYCIN HYDROCHLORIDE 1 G/200ML
1000 INJECTION, SOLUTION INTRAVENOUS ONCE
Status: COMPLETED | OUTPATIENT
Start: 2019-01-01 | End: 2019-01-01

## 2019-01-01 RX ORDER — BACTERIOSTATIC SODIUM CHLORIDE 0.9 %
30 VIAL (ML) INJECTION PRN
Status: DISCONTINUED | OUTPATIENT
Start: 2019-01-01 | End: 2019-01-01 | Stop reason: HOSPADM

## 2019-01-01 RX ORDER — NALOXONE HYDROCHLORIDE 4 MG/.1ML
1 SPRAY NASAL PRN
Qty: 1 EACH | Refills: 5 | Status: SHIPPED | OUTPATIENT
Start: 2019-01-01

## 2019-01-01 RX ORDER — THIAMINE HYDROCHLORIDE 100 MG/ML
100 INJECTION, SOLUTION INTRAMUSCULAR; INTRAVENOUS DAILY
Status: DISCONTINUED | OUTPATIENT
Start: 2019-01-01 | End: 2019-01-01

## 2019-01-01 RX ORDER — LIDOCAINE 4 G/G
1 PATCH TOPICAL DAILY
Status: DISCONTINUED | OUTPATIENT
Start: 2019-01-01 | End: 2019-01-01 | Stop reason: HOSPADM

## 2019-01-01 RX ORDER — SODIUM CHLORIDE, SODIUM LACTATE, POTASSIUM CHLORIDE, CALCIUM CHLORIDE 600; 310; 30; 20 MG/100ML; MG/100ML; MG/100ML; MG/100ML
INJECTION, SOLUTION INTRAVENOUS CONTINUOUS
Status: DISCONTINUED | OUTPATIENT
Start: 2019-01-01 | End: 2019-01-01

## 2019-01-01 RX ORDER — PREDNISONE 1 MG/1
5 TABLET ORAL 2 TIMES DAILY
COMMUNITY
End: 2019-01-01

## 2019-01-01 RX ORDER — LACTOBACILLUS RHAMNOSUS GG 10B CELL
1 CAPSULE ORAL
Status: DISCONTINUED | OUTPATIENT
Start: 2019-01-01 | End: 2019-01-01 | Stop reason: HOSPADM

## 2019-01-01 RX ADMIN — DRONABINOL 5 MG: 2.5 CAPSULE ORAL at 06:26

## 2019-01-01 RX ADMIN — SODIUM CHLORIDE 1000 ML: 9 INJECTION, SOLUTION INTRAVENOUS at 12:36

## 2019-01-01 RX ADMIN — PANTOPRAZOLE SODIUM 40 MG: 40 TABLET, DELAYED RELEASE ORAL at 09:40

## 2019-01-01 RX ADMIN — POTASSIUM & SODIUM PHOSPHATES POWDER PACK 280-160-250 MG 250 MG: 280-160-250 PACK at 09:23

## 2019-01-01 RX ADMIN — THIAMINE HYDROCHLORIDE 100 MG: 100 INJECTION, SOLUTION INTRAMUSCULAR; INTRAVENOUS at 18:33

## 2019-01-01 RX ADMIN — ASPIRIN 81 MG: 81 TABLET, COATED ORAL at 10:32

## 2019-01-01 RX ADMIN — POTASSIUM CHLORIDE 20 MEQ: 750 TABLET, FILM COATED, EXTENDED RELEASE ORAL at 08:46

## 2019-01-01 RX ADMIN — ACETAMINOPHEN 650 MG: 325 TABLET, FILM COATED ORAL at 17:13

## 2019-01-01 RX ADMIN — IPRATROPIUM BROMIDE AND ALBUTEROL SULFATE 1 AMPULE: .5; 3 SOLUTION RESPIRATORY (INHALATION) at 17:37

## 2019-01-01 RX ADMIN — MORPHINE SULFATE 30 MG: 30 TABLET, FILM COATED, EXTENDED RELEASE ORAL at 21:52

## 2019-01-01 RX ADMIN — MORPHINE SULFATE 30 MG: 30 TABLET, FILM COATED, EXTENDED RELEASE ORAL at 15:08

## 2019-01-01 RX ADMIN — MORPHINE SULFATE 30 MG: 30 TABLET, FILM COATED, EXTENDED RELEASE ORAL at 14:20

## 2019-01-01 RX ADMIN — DIPHENHYDRAMINE HCL 25 MG: 25 TABLET ORAL at 23:43

## 2019-01-01 RX ADMIN — POTASSIUM & SODIUM PHOSPHATES POWDER PACK 280-160-250 MG 250 MG: 280-160-250 PACK at 18:32

## 2019-01-01 RX ADMIN — GLYCOPYRROLATE AND FORMOTEROL FUMARATE 2 PUFF: 9; 4.8 AEROSOL, METERED RESPIRATORY (INHALATION) at 09:09

## 2019-01-01 RX ADMIN — ARIPIPRAZOLE 10 MG: 5 TABLET ORAL at 08:17

## 2019-01-01 RX ADMIN — OXYCODONE AND ACETAMINOPHEN 1 TABLET: 10; 325 TABLET ORAL at 09:39

## 2019-01-01 RX ADMIN — Medication 10 ML: at 09:25

## 2019-01-01 RX ADMIN — ONDANSETRON 8 MG: 4 TABLET, ORALLY DISINTEGRATING ORAL at 09:08

## 2019-01-01 RX ADMIN — PANTOPRAZOLE SODIUM 40 MG: 40 TABLET, DELAYED RELEASE ORAL at 09:03

## 2019-01-01 RX ADMIN — ARIPIPRAZOLE 10 MG: 5 TABLET ORAL at 08:29

## 2019-01-01 RX ADMIN — POTASSIUM CHLORIDE 20 MEQ: 750 TABLET, FILM COATED, EXTENDED RELEASE ORAL at 08:34

## 2019-01-01 RX ADMIN — HYDROCORTISONE: 1 CREAM TOPICAL at 10:37

## 2019-01-01 RX ADMIN — ALBUTEROL SULFATE 2.5 MG: 2.5 SOLUTION RESPIRATORY (INHALATION) at 18:17

## 2019-01-01 RX ADMIN — CEFEPIME HYDROCHLORIDE 2 G: 2 INJECTION, POWDER, FOR SOLUTION INTRAVENOUS at 19:56

## 2019-01-01 RX ADMIN — LOPERAMIDE HYDROCHLORIDE 4 MG: 2 CAPSULE ORAL at 10:48

## 2019-01-01 RX ADMIN — DIPHENHYDRAMINE HCL 25 MG: 25 TABLET ORAL at 04:03

## 2019-01-01 RX ADMIN — ARIPIPRAZOLE 10 MG: 5 TABLET ORAL at 09:18

## 2019-01-01 RX ADMIN — POTASSIUM CHLORIDE 20 MEQ: 750 TABLET, FILM COATED, EXTENDED RELEASE ORAL at 07:43

## 2019-01-01 RX ADMIN — MORPHINE SULFATE 30 MG: 30 TABLET, FILM COATED, EXTENDED RELEASE ORAL at 09:03

## 2019-01-01 RX ADMIN — ENOXAPARIN SODIUM 40 MG: 40 INJECTION SUBCUTANEOUS at 11:46

## 2019-01-01 RX ADMIN — VANCOMYCIN HYDROCHLORIDE 1000 MG: 1 INJECTION, SOLUTION INTRAVENOUS at 03:00

## 2019-01-01 RX ADMIN — ATORVASTATIN CALCIUM 20 MG: 20 TABLET, FILM COATED ORAL at 22:00

## 2019-01-01 RX ADMIN — KETOROLAC TROMETHAMINE 15 MG: 30 INJECTION, SOLUTION INTRAMUSCULAR at 19:30

## 2019-01-01 RX ADMIN — HYDROCORTISONE: 1 CREAM TOPICAL at 21:55

## 2019-01-01 RX ADMIN — HYDROMORPHONE HYDROCHLORIDE 0.5 MG: 1 INJECTION, SOLUTION INTRAMUSCULAR; INTRAVENOUS; SUBCUTANEOUS at 23:36

## 2019-01-01 RX ADMIN — OXYCODONE AND ACETAMINOPHEN 1 TABLET: 10; 325 TABLET ORAL at 15:11

## 2019-01-01 RX ADMIN — MORPHINE SULFATE 30 MG: 30 TABLET, FILM COATED, EXTENDED RELEASE ORAL at 08:21

## 2019-01-01 RX ADMIN — ATORVASTATIN CALCIUM 20 MG: 20 TABLET, FILM COATED ORAL at 20:41

## 2019-01-01 RX ADMIN — SODIUM CHLORIDE: 9 INJECTION, SOLUTION INTRAVENOUS at 18:29

## 2019-01-01 RX ADMIN — METHYLPREDNISOLONE SODIUM SUCCINATE 125 MG: 125 INJECTION, POWDER, FOR SOLUTION INTRAMUSCULAR; INTRAVENOUS at 17:50

## 2019-01-01 RX ADMIN — MICONAZOLE NITRATE: 20 POWDER TOPICAL at 20:13

## 2019-01-01 RX ADMIN — DEXAMETHASONE 2 MG: 4 TABLET ORAL at 09:11

## 2019-01-01 RX ADMIN — PANTOPRAZOLE SODIUM 40 MG: 40 TABLET, DELAYED RELEASE ORAL at 11:10

## 2019-01-01 RX ADMIN — POTASSIUM CHLORIDE 20 MEQ: 750 TABLET, FILM COATED, EXTENDED RELEASE ORAL at 11:46

## 2019-01-01 RX ADMIN — ONDANSETRON 4 MG: 2 INJECTION INTRAMUSCULAR; INTRAVENOUS at 05:51

## 2019-01-01 RX ADMIN — ARIPIPRAZOLE 10 MG: 5 TABLET ORAL at 11:10

## 2019-01-01 RX ADMIN — MORPHINE SULFATE 30 MG: 15 TABLET, FILM COATED, EXTENDED RELEASE ORAL at 06:51

## 2019-01-01 RX ADMIN — ASPIRIN 81 MG: 81 TABLET, COATED ORAL at 08:11

## 2019-01-01 RX ADMIN — VENLAFAXINE HYDROCHLORIDE 150 MG: 150 CAPSULE, EXTENDED RELEASE ORAL at 08:20

## 2019-01-01 RX ADMIN — HYDROCORTISONE: 1 CREAM TOPICAL at 09:12

## 2019-01-01 RX ADMIN — ASPIRIN 81 MG: 81 TABLET, COATED ORAL at 11:50

## 2019-01-01 RX ADMIN — MORPHINE SULFATE 30 MG: 30 TABLET, FILM COATED, EXTENDED RELEASE ORAL at 00:51

## 2019-01-01 RX ADMIN — SODIUM CHLORIDE: 9 INJECTION, SOLUTION INTRAVENOUS at 18:45

## 2019-01-01 RX ADMIN — SODIUM CHLORIDE: 9 INJECTION, SOLUTION INTRAVENOUS at 03:01

## 2019-01-01 RX ADMIN — DEXAMETHASONE 2 MG: 4 TABLET ORAL at 21:52

## 2019-01-01 RX ADMIN — METOPROLOL TARTRATE 25 MG: 25 TABLET, FILM COATED ORAL at 21:20

## 2019-01-01 RX ADMIN — CEFEPIME HYDROCHLORIDE 2 G: 2 INJECTION, POWDER, FOR SOLUTION INTRAVENOUS at 10:58

## 2019-01-01 RX ADMIN — MORPHINE SULFATE 30 MG: 30 TABLET, FILM COATED, EXTENDED RELEASE ORAL at 15:55

## 2019-01-01 RX ADMIN — VANCOMYCIN HYDROCHLORIDE 1000 MG: 1 INJECTION, SOLUTION INTRAVENOUS at 02:05

## 2019-01-01 RX ADMIN — CEFEPIME HYDROCHLORIDE 2 G: 2 INJECTION, POWDER, FOR SOLUTION INTRAVENOUS at 10:41

## 2019-01-01 RX ADMIN — VANCOMYCIN HYDROCHLORIDE 1000 MG: 1 INJECTION, SOLUTION INTRAVENOUS at 21:26

## 2019-01-01 RX ADMIN — Medication 100 MG: at 09:04

## 2019-01-01 RX ADMIN — ARIPIPRAZOLE 10 MG: 5 TABLET ORAL at 10:56

## 2019-01-01 RX ADMIN — DRONABINOL 2.5 MG: 2.5 CAPSULE ORAL at 20:12

## 2019-01-01 RX ADMIN — ATORVASTATIN CALCIUM 20 MG: 20 TABLET, FILM COATED ORAL at 20:27

## 2019-01-01 RX ADMIN — VENLAFAXINE HYDROCHLORIDE 150 MG: 150 CAPSULE, EXTENDED RELEASE ORAL at 11:19

## 2019-01-01 RX ADMIN — PANTOPRAZOLE SODIUM 40 MG: 40 TABLET, DELAYED RELEASE ORAL at 08:34

## 2019-01-01 RX ADMIN — OXYCODONE AND ACETAMINOPHEN 1 TABLET: 10; 325 TABLET ORAL at 23:50

## 2019-01-01 RX ADMIN — METOPROLOL TARTRATE 25 MG: 25 TABLET, FILM COATED ORAL at 09:17

## 2019-01-01 RX ADMIN — GLYCOPYRROLATE AND FORMOTEROL FUMARATE 2 PUFF: 9; 4.8 AEROSOL, METERED RESPIRATORY (INHALATION) at 19:58

## 2019-01-01 RX ADMIN — DEXAMETHASONE 2 MG: 4 TABLET ORAL at 20:48

## 2019-01-01 RX ADMIN — SODIUM CHLORIDE: 9 INJECTION, SOLUTION INTRAVENOUS at 17:10

## 2019-01-01 RX ADMIN — Medication 1 CAPSULE: at 09:10

## 2019-01-01 RX ADMIN — DRONABINOL 2.5 MG: 2.5 CAPSULE ORAL at 11:06

## 2019-01-01 RX ADMIN — METOPROLOL TARTRATE 25 MG: 25 TABLET, FILM COATED ORAL at 10:41

## 2019-01-01 RX ADMIN — ASPIRIN 81 MG: 81 TABLET, COATED ORAL at 09:31

## 2019-01-01 RX ADMIN — ATORVASTATIN CALCIUM 20 MG: 20 TABLET, FILM COATED ORAL at 20:00

## 2019-01-01 RX ADMIN — PANTOPRAZOLE SODIUM 40 MG: 40 TABLET, DELAYED RELEASE ORAL at 08:11

## 2019-01-01 RX ADMIN — ARIPIPRAZOLE 10 MG: 5 TABLET ORAL at 09:23

## 2019-01-01 RX ADMIN — CEFEPIME HYDROCHLORIDE 2 G: 2 INJECTION, POWDER, FOR SOLUTION INTRAVENOUS at 20:17

## 2019-01-01 RX ADMIN — HYDROMORPHONE HYDROCHLORIDE 0.5 MG: 1 INJECTION, SOLUTION INTRAMUSCULAR; INTRAVENOUS; SUBCUTANEOUS at 00:59

## 2019-01-01 RX ADMIN — PROMETHAZINE HYDROCHLORIDE 12.5 MG: 25 INJECTION INTRAMUSCULAR; INTRAVENOUS at 19:57

## 2019-01-01 RX ADMIN — VENLAFAXINE HYDROCHLORIDE 300 MG: 150 CAPSULE, EXTENDED RELEASE ORAL at 08:11

## 2019-01-01 RX ADMIN — ENOXAPARIN SODIUM 40 MG: 40 INJECTION SUBCUTANEOUS at 08:56

## 2019-01-01 RX ADMIN — ONDANSETRON 8 MG: 2 INJECTION INTRAMUSCULAR; INTRAVENOUS at 18:55

## 2019-01-01 RX ADMIN — POTASSIUM & SODIUM PHOSPHATES POWDER PACK 280-160-250 MG 250 MG: 280-160-250 PACK at 15:00

## 2019-01-01 RX ADMIN — ASPIRIN 81 MG 81 MG: 81 TABLET ORAL at 09:03

## 2019-01-01 RX ADMIN — MORPHINE SULFATE 30 MG: 30 TABLET, FILM COATED, EXTENDED RELEASE ORAL at 15:06

## 2019-01-01 RX ADMIN — Medication 100 MG: at 11:23

## 2019-01-01 RX ADMIN — SODIUM CHLORIDE 1000 ML: 9 INJECTION, SOLUTION INTRAVENOUS at 08:21

## 2019-01-01 RX ADMIN — METOPROLOL TARTRATE 25 MG: 25 TABLET, FILM COATED ORAL at 09:15

## 2019-01-01 RX ADMIN — ASPIRIN 81 MG: 81 TABLET, COATED ORAL at 09:25

## 2019-01-01 RX ADMIN — ACETAMINOPHEN 650 MG: 325 TABLET, FILM COATED ORAL at 10:15

## 2019-01-01 RX ADMIN — CEFEPIME HYDROCHLORIDE 2 G: 2 INJECTION, POWDER, FOR SOLUTION INTRAVENOUS at 09:03

## 2019-01-01 RX ADMIN — MORPHINE SULFATE 30 MG: 30 TABLET, FILM COATED, EXTENDED RELEASE ORAL at 20:27

## 2019-01-01 RX ADMIN — METOPROLOL TARTRATE 25 MG: 25 TABLET, FILM COATED ORAL at 23:37

## 2019-01-01 RX ADMIN — POTASSIUM CHLORIDE 20 MEQ: 750 TABLET, FILM COATED, EXTENDED RELEASE ORAL at 09:17

## 2019-01-01 RX ADMIN — PANTOPRAZOLE SODIUM 40 MG: 40 TABLET, DELAYED RELEASE ORAL at 09:02

## 2019-01-01 RX ADMIN — ONDANSETRON 8 MG: 4 TABLET, ORALLY DISINTEGRATING ORAL at 17:38

## 2019-01-01 RX ADMIN — VENLAFAXINE HYDROCHLORIDE 300 MG: 150 CAPSULE, EXTENDED RELEASE ORAL at 09:10

## 2019-01-01 RX ADMIN — ASPIRIN 81 MG: 81 TABLET, COATED ORAL at 09:10

## 2019-01-01 RX ADMIN — ENOXAPARIN SODIUM 40 MG: 40 INJECTION SUBCUTANEOUS at 08:28

## 2019-01-01 RX ADMIN — METOPROLOL TARTRATE 25 MG: 25 TABLET, FILM COATED ORAL at 22:04

## 2019-01-01 RX ADMIN — GLYCOPYRROLATE AND FORMOTEROL FUMARATE 2 PUFF: 9; 4.8 AEROSOL, METERED RESPIRATORY (INHALATION) at 09:18

## 2019-01-01 RX ADMIN — TBO-FILGRASTIM 300 MCG: 300 INJECTION, SOLUTION SUBCUTANEOUS at 18:33

## 2019-01-01 RX ADMIN — ASPIRIN 81 MG: 81 TABLET, COATED ORAL at 09:23

## 2019-01-01 RX ADMIN — OXYCODONE AND ACETAMINOPHEN 1 TABLET: 10; 325 TABLET ORAL at 02:21

## 2019-01-01 RX ADMIN — PANTOPRAZOLE SODIUM 40 MG: 40 TABLET, DELAYED RELEASE ORAL at 07:43

## 2019-01-01 RX ADMIN — GLYCOPYRROLATE AND FORMOTEROL FUMARATE 2 PUFF: 9; 4.8 AEROSOL, METERED RESPIRATORY (INHALATION) at 20:25

## 2019-01-01 RX ADMIN — LORAZEPAM 1 MG: 1 TABLET ORAL at 01:24

## 2019-01-01 RX ADMIN — POTASSIUM PHOSPHATE, MONOBASIC AND POTASSIUM PHOSPHATE, DIBASIC 40 MMOL: 224; 236 INJECTION, SOLUTION, CONCENTRATE INTRAVENOUS at 09:42

## 2019-01-01 RX ADMIN — SODIUM CHLORIDE 1986 ML: 9 INJECTION, SOLUTION INTRAVENOUS at 19:40

## 2019-01-01 RX ADMIN — DRONABINOL 5 MG: 2.5 CAPSULE ORAL at 09:13

## 2019-01-01 RX ADMIN — SODIUM CHLORIDE, POTASSIUM CHLORIDE, SODIUM LACTATE AND CALCIUM CHLORIDE: 600; 310; 30; 20 INJECTION, SOLUTION INTRAVENOUS at 17:23

## 2019-01-01 RX ADMIN — GLYCOPYRROLATE AND FORMOTEROL FUMARATE 2 PUFF: 9; 4.8 AEROSOL, METERED RESPIRATORY (INHALATION) at 07:37

## 2019-01-01 RX ADMIN — ENOXAPARIN SODIUM 40 MG: 40 INJECTION SUBCUTANEOUS at 09:18

## 2019-01-01 RX ADMIN — POTASSIUM CHLORIDE 10 MEQ: 7.46 INJECTION, SOLUTION INTRAVENOUS at 13:48

## 2019-01-01 RX ADMIN — VANCOMYCIN HYDROCHLORIDE 1000 MG: 1 INJECTION, SOLUTION INTRAVENOUS at 14:59

## 2019-01-01 RX ADMIN — ENOXAPARIN SODIUM 40 MG: 40 INJECTION SUBCUTANEOUS at 09:03

## 2019-01-01 RX ADMIN — SODIUM BICARBONATE 2.4 MEQ: 0.2 INJECTION, SOLUTION INTRAVENOUS at 10:08

## 2019-01-01 RX ADMIN — ENOXAPARIN SODIUM 40 MG: 40 INJECTION SUBCUTANEOUS at 10:42

## 2019-01-01 RX ADMIN — POLYETHYLENE GLYCOL 3350 17 G: 17 POWDER, FOR SOLUTION ORAL at 08:44

## 2019-01-01 RX ADMIN — POTASSIUM & SODIUM PHOSPHATES POWDER PACK 280-160-250 MG 250 MG: 280-160-250 PACK at 20:41

## 2019-01-01 RX ADMIN — GLYCOPYRROLATE AND FORMOTEROL FUMARATE 2 PUFF: 9; 4.8 AEROSOL, METERED RESPIRATORY (INHALATION) at 09:35

## 2019-01-01 RX ADMIN — MORPHINE SULFATE 30 MG: 30 TABLET, FILM COATED, EXTENDED RELEASE ORAL at 22:06

## 2019-01-01 RX ADMIN — SODIUM CHLORIDE 100 ML: 9 INJECTION, SOLUTION INTRAVENOUS at 11:52

## 2019-01-01 RX ADMIN — LIDOCAINE HYDROCHLORIDE 10 ML: 10 INJECTION, SOLUTION EPIDURAL; INFILTRATION; INTRACAUDAL; PERINEURAL at 10:07

## 2019-01-01 RX ADMIN — Medication 10 ML: at 22:10

## 2019-01-01 RX ADMIN — SODIUM CHLORIDE: 9 INJECTION, SOLUTION INTRAVENOUS at 06:35

## 2019-01-01 RX ADMIN — MORPHINE SULFATE 30 MG: 30 TABLET, FILM COATED, EXTENDED RELEASE ORAL at 10:57

## 2019-01-01 RX ADMIN — LORAZEPAM 0.5 MG: 1 TABLET ORAL at 09:19

## 2019-01-01 RX ADMIN — MORPHINE SULFATE 30 MG: 30 TABLET, FILM COATED, EXTENDED RELEASE ORAL at 09:10

## 2019-01-01 RX ADMIN — MIDAZOLAM HYDROCHLORIDE 0.5 MG: 1 INJECTION INTRAMUSCULAR; INTRAVENOUS at 08:42

## 2019-01-01 RX ADMIN — GLYCOPYRROLATE AND FORMOTEROL FUMARATE 2 PUFF: 9; 4.8 AEROSOL, METERED RESPIRATORY (INHALATION) at 08:26

## 2019-01-01 RX ADMIN — Medication 10 ML: at 11:17

## 2019-01-01 RX ADMIN — VENLAFAXINE HYDROCHLORIDE 150 MG: 150 CAPSULE, EXTENDED RELEASE ORAL at 08:23

## 2019-01-01 RX ADMIN — ENOXAPARIN SODIUM 40 MG: 40 INJECTION SUBCUTANEOUS at 08:11

## 2019-01-01 RX ADMIN — METOPROLOL TARTRATE 25 MG: 25 TABLET, FILM COATED ORAL at 20:36

## 2019-01-01 RX ADMIN — POTASSIUM CHLORIDE 20 MEQ: 750 TABLET, FILM COATED, EXTENDED RELEASE ORAL at 08:11

## 2019-01-01 RX ADMIN — ARIPIPRAZOLE 10 MG: 5 TABLET ORAL at 09:30

## 2019-01-01 RX ADMIN — LORAZEPAM 0.5 MG: 0.5 TABLET ORAL at 03:00

## 2019-01-01 RX ADMIN — VENLAFAXINE HYDROCHLORIDE 300 MG: 150 CAPSULE, EXTENDED RELEASE ORAL at 08:46

## 2019-01-01 RX ADMIN — Medication 1 CAPSULE: at 08:34

## 2019-01-01 RX ADMIN — Medication 10 ML: at 20:49

## 2019-01-01 RX ADMIN — ONDANSETRON 4 MG: 2 INJECTION INTRAMUSCULAR; INTRAVENOUS at 02:19

## 2019-01-01 RX ADMIN — GLYCOPYRROLATE AND FORMOTEROL FUMARATE 2 PUFF: 9; 4.8 AEROSOL, METERED RESPIRATORY (INHALATION) at 09:12

## 2019-01-01 RX ADMIN — DRONABINOL 5 MG: 2.5 CAPSULE ORAL at 05:39

## 2019-01-01 RX ADMIN — ASPIRIN 81 MG: 81 TABLET, COATED ORAL at 10:58

## 2019-01-01 RX ADMIN — SODIUM PHOSPHATE, MONOBASIC, MONOHYDRATE 20 MMOL: 276; 142 INJECTION, SOLUTION INTRAVENOUS at 12:24

## 2019-01-01 RX ADMIN — SODIUM PHOSPHATE, MONOBASIC, MONOHYDRATE 20.25 MMOL: 276; 142 INJECTION, SOLUTION INTRAVENOUS at 08:46

## 2019-01-01 RX ADMIN — ASPIRIN 81 MG 81 MG: 81 TABLET ORAL at 09:18

## 2019-01-01 RX ADMIN — ARIPIPRAZOLE 10 MG: 5 TABLET ORAL at 08:11

## 2019-01-01 RX ADMIN — DEXAMETHASONE SODIUM PHOSPHATE 4 MG: 4 INJECTION, SOLUTION INTRAMUSCULAR; INTRAVENOUS at 10:03

## 2019-01-01 RX ADMIN — LORAZEPAM 1 MG: 1 TABLET ORAL at 20:53

## 2019-01-01 RX ADMIN — MORPHINE SULFATE 30 MG: 30 TABLET, FILM COATED, EXTENDED RELEASE ORAL at 20:48

## 2019-01-01 RX ADMIN — Medication 10 ML: at 20:30

## 2019-01-01 RX ADMIN — DRONABINOL 2.5 MG: 2.5 CAPSULE ORAL at 23:02

## 2019-01-01 RX ADMIN — POLYETHYLENE GLYCOL 3350 17 G: 17 POWDER, FOR SOLUTION ORAL at 15:07

## 2019-01-01 RX ADMIN — OXYCODONE AND ACETAMINOPHEN 1 TABLET: 10; 325 TABLET ORAL at 21:19

## 2019-01-01 RX ADMIN — ONDANSETRON 4 MG: 2 INJECTION INTRAMUSCULAR; INTRAVENOUS at 15:19

## 2019-01-01 RX ADMIN — Medication 10 ML: at 20:41

## 2019-01-01 RX ADMIN — Medication 10 ML: at 20:07

## 2019-01-01 RX ADMIN — MORPHINE SULFATE 30 MG: 30 TABLET, FILM COATED, EXTENDED RELEASE ORAL at 15:47

## 2019-01-01 RX ADMIN — METOPROLOL TARTRATE 25 MG: 25 TABLET, FILM COATED ORAL at 09:18

## 2019-01-01 RX ADMIN — ENOXAPARIN SODIUM 40 MG: 40 INJECTION SUBCUTANEOUS at 08:58

## 2019-01-01 RX ADMIN — METOPROLOL TARTRATE 25 MG: 25 TABLET, FILM COATED ORAL at 09:04

## 2019-01-01 RX ADMIN — Medication 1 CAPSULE: at 07:43

## 2019-01-01 RX ADMIN — SENNOSIDES 8.6 MG: 8.6 TABLET, FILM COATED ORAL at 21:20

## 2019-01-01 RX ADMIN — POTASSIUM & SODIUM PHOSPHATES POWDER PACK 280-160-250 MG 250 MG: 280-160-250 PACK at 13:17

## 2019-01-01 RX ADMIN — SUCCINYLCHOLINE CHLORIDE 140 MG: 20 INJECTION, SOLUTION INTRAMUSCULAR; INTRAVENOUS at 09:01

## 2019-01-01 RX ADMIN — DIPHENHYDRAMINE HCL 25 MG: 25 TABLET ORAL at 17:13

## 2019-01-01 RX ADMIN — THIAMINE HYDROCHLORIDE 100 MG: 100 INJECTION, SOLUTION INTRAMUSCULAR; INTRAVENOUS at 17:17

## 2019-01-01 RX ADMIN — OXYCODONE AND ACETAMINOPHEN 1 TABLET: 10; 325 TABLET ORAL at 17:54

## 2019-01-01 RX ADMIN — ARIPIPRAZOLE 10 MG: 5 TABLET ORAL at 09:57

## 2019-01-01 RX ADMIN — ASPIRIN 81 MG: 81 TABLET, COATED ORAL at 11:45

## 2019-01-01 RX ADMIN — ONDANSETRON 8 MG: 4 TABLET, ORALLY DISINTEGRATING ORAL at 13:16

## 2019-01-01 RX ADMIN — PANTOPRAZOLE SODIUM 40 MG: 40 TABLET, DELAYED RELEASE ORAL at 09:11

## 2019-01-01 RX ADMIN — ONDANSETRON 8 MG: 2 INJECTION INTRAMUSCULAR; INTRAVENOUS at 08:13

## 2019-01-01 RX ADMIN — ASPIRIN 81 MG 81 MG: 81 TABLET ORAL at 08:45

## 2019-01-01 RX ADMIN — METOPROLOL TARTRATE 25 MG: 25 TABLET, FILM COATED ORAL at 20:07

## 2019-01-01 RX ADMIN — POTASSIUM & SODIUM PHOSPHATES POWDER PACK 280-160-250 MG 250 MG: 280-160-250 PACK at 09:10

## 2019-01-01 RX ADMIN — SODIUM CHLORIDE 100 ML: 9 INJECTION, SOLUTION INTRAVENOUS at 18:33

## 2019-01-01 RX ADMIN — VANCOMYCIN HYDROCHLORIDE 1000 MG: 1 INJECTION, SOLUTION INTRAVENOUS at 00:13

## 2019-01-01 RX ADMIN — PANTOPRAZOLE SODIUM 40 MG: 40 INJECTION, POWDER, FOR SOLUTION INTRAVENOUS at 06:41

## 2019-01-01 RX ADMIN — CEFEPIME HYDROCHLORIDE 2 G: 2 INJECTION, POWDER, FOR SOLUTION INTRAVENOUS at 09:18

## 2019-01-01 RX ADMIN — DRONABINOL 5 MG: 2.5 CAPSULE ORAL at 15:22

## 2019-01-01 RX ADMIN — CALCIUM GLUCONATE 1 G: 98 INJECTION, SOLUTION INTRAVENOUS at 15:19

## 2019-01-01 RX ADMIN — ONDANSETRON 4 MG: 2 INJECTION INTRAMUSCULAR; INTRAVENOUS at 23:42

## 2019-01-01 RX ADMIN — ARIPIPRAZOLE 10 MG: 5 TABLET ORAL at 08:46

## 2019-01-01 RX ADMIN — ONDANSETRON 8 MG: 4 TABLET, ORALLY DISINTEGRATING ORAL at 09:10

## 2019-01-01 RX ADMIN — PROCHLORPERAZINE MALEATE 5 MG: 5 TABLET ORAL at 02:55

## 2019-01-01 RX ADMIN — POLYETHYLENE GLYCOL 3350 17 G: 17 POWDER, FOR SOLUTION ORAL at 08:17

## 2019-01-01 RX ADMIN — GLYCOPYRROLATE AND FORMOTEROL FUMARATE 2 PUFF: 9; 4.8 AEROSOL, METERED RESPIRATORY (INHALATION) at 09:53

## 2019-01-01 RX ADMIN — TAMSULOSIN HYDROCHLORIDE 0.4 MG: 0.4 CAPSULE ORAL at 23:36

## 2019-01-01 RX ADMIN — CEFEPIME HYDROCHLORIDE 2 G: 2 INJECTION, POWDER, FOR SOLUTION INTRAVENOUS at 10:16

## 2019-01-01 RX ADMIN — ASPIRIN 81 MG: 81 TABLET, COATED ORAL at 11:10

## 2019-01-01 RX ADMIN — VENLAFAXINE HYDROCHLORIDE 300 MG: 150 CAPSULE, EXTENDED RELEASE ORAL at 09:18

## 2019-01-01 RX ADMIN — GLYCOPYRROLATE AND FORMOTEROL FUMARATE 2 PUFF: 9; 4.8 AEROSOL, METERED RESPIRATORY (INHALATION) at 08:46

## 2019-01-01 RX ADMIN — LORAZEPAM 1 MG: 1 TABLET ORAL at 23:29

## 2019-01-01 RX ADMIN — METOPROLOL TARTRATE 25 MG: 25 TABLET, FILM COATED ORAL at 09:30

## 2019-01-01 RX ADMIN — FENTANYL CITRATE 25 MCG: 50 INJECTION INTRAMUSCULAR; INTRAVENOUS at 08:42

## 2019-01-01 RX ADMIN — METOPROLOL TARTRATE 25 MG: 25 TABLET, FILM COATED ORAL at 11:20

## 2019-01-01 RX ADMIN — HYDROMORPHONE HYDROCHLORIDE 0.5 MG: 1 INJECTION, SOLUTION INTRAMUSCULAR; INTRAVENOUS; SUBCUTANEOUS at 09:55

## 2019-01-01 RX ADMIN — METOPROLOL TARTRATE 25 MG: 25 TABLET, FILM COATED ORAL at 20:48

## 2019-01-01 RX ADMIN — ARIPIPRAZOLE 10 MG: 5 TABLET ORAL at 11:50

## 2019-01-01 RX ADMIN — Medication 10 ML: at 20:08

## 2019-01-01 RX ADMIN — ONDANSETRON 8 MG: 2 INJECTION INTRAMUSCULAR; INTRAVENOUS at 20:20

## 2019-01-01 RX ADMIN — ONDANSETRON 8 MG: 2 INJECTION INTRAMUSCULAR; INTRAVENOUS at 19:52

## 2019-01-01 RX ADMIN — MORPHINE SULFATE 30 MG: 30 TABLET, FILM COATED, EXTENDED RELEASE ORAL at 20:40

## 2019-01-01 RX ADMIN — PANTOPRAZOLE SODIUM 40 MG: 40 TABLET, DELAYED RELEASE ORAL at 08:17

## 2019-01-01 RX ADMIN — POTASSIUM & SODIUM PHOSPHATES POWDER PACK 280-160-250 MG 250 MG: 280-160-250 PACK at 10:35

## 2019-01-01 RX ADMIN — ASPIRIN 81 MG 81 MG: 81 TABLET ORAL at 10:41

## 2019-01-01 RX ADMIN — ASPIRIN 81 MG 81 MG: 81 TABLET ORAL at 08:17

## 2019-01-01 RX ADMIN — SODIUM CHLORIDE 1000 ML: 9 INJECTION, SOLUTION INTRAVENOUS at 10:30

## 2019-01-01 RX ADMIN — CEFEPIME HYDROCHLORIDE 2 G: 2 INJECTION, POWDER, FOR SOLUTION INTRAVENOUS at 22:12

## 2019-01-01 RX ADMIN — PREDNISONE 60 MG: 20 TABLET ORAL at 16:15

## 2019-01-01 RX ADMIN — BACITRACIN ZINC AND POLYMYXIN B SULFATE: at 11:15

## 2019-01-01 RX ADMIN — Medication 10 ML: at 09:21

## 2019-01-01 RX ADMIN — ARIPIPRAZOLE 10 MG: 5 TABLET ORAL at 08:12

## 2019-01-01 RX ADMIN — HYDROMORPHONE HYDROCHLORIDE 1 MG: 1 INJECTION, SOLUTION INTRAMUSCULAR; INTRAVENOUS; SUBCUTANEOUS at 21:30

## 2019-01-01 RX ADMIN — Medication 1 CAPSULE: at 08:11

## 2019-01-01 RX ADMIN — ATORVASTATIN CALCIUM 20 MG: 20 TABLET, FILM COATED ORAL at 21:51

## 2019-01-01 RX ADMIN — METOPROLOL TARTRATE 25 MG: 25 TABLET, FILM COATED ORAL at 07:43

## 2019-01-01 RX ADMIN — PROMETHAZINE HYDROCHLORIDE 12.5 MG: 25 INJECTION INTRAMUSCULAR; INTRAVENOUS at 07:05

## 2019-01-01 RX ADMIN — Medication 1 CAPSULE: at 11:50

## 2019-01-01 RX ADMIN — MORPHINE SULFATE 30 MG: 30 TABLET, FILM COATED, EXTENDED RELEASE ORAL at 09:15

## 2019-01-01 RX ADMIN — VENLAFAXINE HYDROCHLORIDE 150 MG: 150 CAPSULE, EXTENDED RELEASE ORAL at 10:02

## 2019-01-01 RX ADMIN — LORAZEPAM 0.5 MG: 1 TABLET ORAL at 21:20

## 2019-01-01 RX ADMIN — OXYCODONE AND ACETAMINOPHEN 1 TABLET: 10; 325 TABLET ORAL at 09:31

## 2019-01-01 RX ADMIN — VANCOMYCIN HYDROCHLORIDE 1000 MG: 1 INJECTION, SOLUTION INTRAVENOUS at 10:09

## 2019-01-01 RX ADMIN — METOPROLOL TARTRATE 25 MG: 25 TABLET, FILM COATED ORAL at 21:01

## 2019-01-01 RX ADMIN — ENOXAPARIN SODIUM 40 MG: 40 INJECTION SUBCUTANEOUS at 20:27

## 2019-01-01 RX ADMIN — MORPHINE SULFATE 30 MG: 30 TABLET, FILM COATED, EXTENDED RELEASE ORAL at 22:09

## 2019-01-01 RX ADMIN — Medication 10 ML: at 07:01

## 2019-01-01 RX ADMIN — IOHEXOL 50 ML: 240 INJECTION, SOLUTION INTRATHECAL; INTRAVASCULAR; INTRAVENOUS; ORAL at 16:04

## 2019-01-01 RX ADMIN — VENLAFAXINE HYDROCHLORIDE 300 MG: 150 CAPSULE, EXTENDED RELEASE ORAL at 09:04

## 2019-01-01 RX ADMIN — ONDANSETRON 4 MG: 2 INJECTION INTRAMUSCULAR; INTRAVENOUS at 18:30

## 2019-01-01 RX ADMIN — LIDOCAINE HYDROCHLORIDE 5 ML: 10 INJECTION, SOLUTION EPIDURAL; INFILTRATION; INTRACAUDAL; PERINEURAL at 10:55

## 2019-01-01 RX ADMIN — ASPIRIN 81 MG 81 MG: 81 TABLET ORAL at 09:57

## 2019-01-01 RX ADMIN — ATORVASTATIN CALCIUM 20 MG: 20 TABLET, FILM COATED ORAL at 22:04

## 2019-01-01 RX ADMIN — Medication 10 ML: at 10:42

## 2019-01-01 RX ADMIN — PANTOPRAZOLE SODIUM 40 MG: 40 TABLET, DELAYED RELEASE ORAL at 10:56

## 2019-01-01 RX ADMIN — MORPHINE SULFATE 30 MG: 30 TABLET, FILM COATED, EXTENDED RELEASE ORAL at 20:36

## 2019-01-01 RX ADMIN — SODIUM PHOSPHATE, MONOBASIC, MONOHYDRATE 20.25 MMOL: 276; 142 INJECTION, SOLUTION INTRAVENOUS at 12:09

## 2019-01-01 RX ADMIN — VENLAFAXINE HYDROCHLORIDE 300 MG: 150 CAPSULE, EXTENDED RELEASE ORAL at 08:44

## 2019-01-01 RX ADMIN — MORPHINE SULFATE 30 MG: 30 TABLET, FILM COATED, EXTENDED RELEASE ORAL at 06:08

## 2019-01-01 RX ADMIN — VENLAFAXINE HYDROCHLORIDE 300 MG: 150 CAPSULE, EXTENDED RELEASE ORAL at 10:33

## 2019-01-01 RX ADMIN — PANTOPRAZOLE SODIUM 40 MG: 40 INJECTION, POWDER, FOR SOLUTION INTRAVENOUS at 06:11

## 2019-01-01 RX ADMIN — DEXAMETHASONE SODIUM PHOSPHATE 4 MG: 4 INJECTION, SOLUTION INTRAMUSCULAR; INTRAVENOUS at 08:21

## 2019-01-01 RX ADMIN — SODIUM CHLORIDE: 9 INJECTION, SOLUTION INTRAVENOUS at 02:40

## 2019-01-01 RX ADMIN — GLYCOPYRROLATE AND FORMOTEROL FUMARATE 2 PUFF: 9; 4.8 AEROSOL, METERED RESPIRATORY (INHALATION) at 08:31

## 2019-01-01 RX ADMIN — ARIPIPRAZOLE 10 MG: 5 TABLET ORAL at 08:23

## 2019-01-01 RX ADMIN — MORPHINE SULFATE 30 MG: 30 TABLET, FILM COATED, EXTENDED RELEASE ORAL at 20:28

## 2019-01-01 RX ADMIN — MORPHINE SULFATE 30 MG: 30 TABLET, FILM COATED, EXTENDED RELEASE ORAL at 08:46

## 2019-01-01 RX ADMIN — POTASSIUM & SODIUM PHOSPHATES POWDER PACK 280-160-250 MG 250 MG: 280-160-250 PACK at 09:15

## 2019-01-01 RX ADMIN — Medication 10 ML: at 07:51

## 2019-01-01 RX ADMIN — POTASSIUM CHLORIDE 20 MEQ: 750 TABLET, FILM COATED, EXTENDED RELEASE ORAL at 09:02

## 2019-01-01 RX ADMIN — OXYCODONE AND ACETAMINOPHEN 1 TABLET: 10; 325 TABLET ORAL at 06:10

## 2019-01-01 RX ADMIN — VENLAFAXINE HYDROCHLORIDE 300 MG: 150 CAPSULE, EXTENDED RELEASE ORAL at 11:10

## 2019-01-01 RX ADMIN — OXYCODONE AND ACETAMINOPHEN 1 TABLET: 10; 325 TABLET ORAL at 15:26

## 2019-01-01 RX ADMIN — HYDROCORTISONE: 1 CREAM TOPICAL at 07:51

## 2019-01-01 RX ADMIN — ONDANSETRON 8 MG: 2 INJECTION INTRAMUSCULAR; INTRAVENOUS at 15:53

## 2019-01-01 RX ADMIN — ATORVASTATIN CALCIUM 20 MG: 20 TABLET, FILM COATED ORAL at 21:19

## 2019-01-01 RX ADMIN — ACETAMINOPHEN 650 MG: 325 TABLET, FILM COATED ORAL at 00:33

## 2019-01-01 RX ADMIN — CEFEPIME HYDROCHLORIDE 2 G: 2 INJECTION, POWDER, FOR SOLUTION INTRAVENOUS at 08:17

## 2019-01-01 RX ADMIN — ENOXAPARIN SODIUM 40 MG: 40 INJECTION SUBCUTANEOUS at 09:17

## 2019-01-01 RX ADMIN — MORPHINE SULFATE 30 MG: 30 TABLET, FILM COATED, EXTENDED RELEASE ORAL at 22:53

## 2019-01-01 RX ADMIN — Medication 10 ML: at 10:26

## 2019-01-01 RX ADMIN — OXYCODONE AND ACETAMINOPHEN 1 TABLET: 10; 325 TABLET ORAL at 17:27

## 2019-01-01 RX ADMIN — Medication 10 ML: at 09:19

## 2019-01-01 RX ADMIN — DIPHENHYDRAMINE HCL 25 MG: 25 TABLET ORAL at 23:57

## 2019-01-01 RX ADMIN — POTASSIUM & SODIUM PHOSPHATES POWDER PACK 280-160-250 MG 250 MG: 280-160-250 PACK at 15:48

## 2019-01-01 RX ADMIN — GLYCOPYRROLATE AND FORMOTEROL FUMARATE 2 PUFF: 9; 4.8 AEROSOL, METERED RESPIRATORY (INHALATION) at 19:43

## 2019-01-01 RX ADMIN — GLYCOPYRROLATE AND FORMOTEROL FUMARATE 2 PUFF: 9; 4.8 AEROSOL, METERED RESPIRATORY (INHALATION) at 20:05

## 2019-01-01 RX ADMIN — HYDROMORPHONE HYDROCHLORIDE 0.5 MG: 1 INJECTION, SOLUTION INTRAMUSCULAR; INTRAVENOUS; SUBCUTANEOUS at 14:24

## 2019-01-01 RX ADMIN — HYDROMORPHONE HYDROCHLORIDE 0.5 MG: 1 INJECTION, SOLUTION INTRAMUSCULAR; INTRAVENOUS; SUBCUTANEOUS at 02:55

## 2019-01-01 RX ADMIN — ARIPIPRAZOLE 10 MG: 5 TABLET ORAL at 08:20

## 2019-01-01 RX ADMIN — Medication 1 CAPSULE: at 10:57

## 2019-01-01 RX ADMIN — POTASSIUM CHLORIDE 40 MEQ: 1500 TABLET, EXTENDED RELEASE ORAL at 15:22

## 2019-01-01 RX ADMIN — HYDROCORTISONE: 1 CREAM TOPICAL at 11:14

## 2019-01-01 RX ADMIN — PANTOPRAZOLE SODIUM 40 MG: 40 TABLET, DELAYED RELEASE ORAL at 08:57

## 2019-01-01 RX ADMIN — SODIUM PHOSPHATE, MONOBASIC, MONOHYDRATE 20.25 MMOL: 276; 142 INJECTION, SOLUTION INTRAVENOUS at 18:10

## 2019-01-01 RX ADMIN — DIPHENHYDRAMINE HCL 25 MG: 25 TABLET ORAL at 21:40

## 2019-01-01 RX ADMIN — OXYCODONE AND ACETAMINOPHEN 1 TABLET: 10; 325 TABLET ORAL at 14:16

## 2019-01-01 RX ADMIN — ARIPIPRAZOLE 10 MG: 5 TABLET ORAL at 09:09

## 2019-01-01 RX ADMIN — ONDANSETRON 4 MG: 2 INJECTION INTRAMUSCULAR; INTRAVENOUS at 15:50

## 2019-01-01 RX ADMIN — MICONAZOLE NITRATE: 20 POWDER TOPICAL at 08:18

## 2019-01-01 RX ADMIN — MORPHINE SULFATE 30 MG: 30 TABLET, FILM COATED, EXTENDED RELEASE ORAL at 22:02

## 2019-01-01 RX ADMIN — MORPHINE SULFATE 30 MG: 30 TABLET, FILM COATED, EXTENDED RELEASE ORAL at 23:44

## 2019-01-01 RX ADMIN — POLYETHYLENE GLYCOL 3350 17 G: 17 POWDER, FOR SOLUTION ORAL at 09:39

## 2019-01-01 RX ADMIN — Medication 100 MG: at 11:09

## 2019-01-01 RX ADMIN — ASPIRIN 81 MG 81 MG: 81 TABLET ORAL at 11:19

## 2019-01-01 RX ADMIN — VENLAFAXINE HYDROCHLORIDE 150 MG: 150 CAPSULE, EXTENDED RELEASE ORAL at 08:11

## 2019-01-01 RX ADMIN — METOPROLOL TARTRATE 25 MG: 25 TABLET, FILM COATED ORAL at 08:34

## 2019-01-01 RX ADMIN — ENOXAPARIN SODIUM 40 MG: 40 INJECTION SUBCUTANEOUS at 11:22

## 2019-01-01 RX ADMIN — FUROSEMIDE 20 MG: 10 INJECTION, SOLUTION INTRAMUSCULAR; INTRAVENOUS at 23:40

## 2019-01-01 RX ADMIN — POTASSIUM & SODIUM PHOSPHATES POWDER PACK 280-160-250 MG 250 MG: 280-160-250 PACK at 14:20

## 2019-01-01 RX ADMIN — HYDROCORTISONE: 1 CREAM TOPICAL at 11:51

## 2019-01-01 RX ADMIN — MORPHINE SULFATE 30 MG: 30 TABLET, FILM COATED, EXTENDED RELEASE ORAL at 22:12

## 2019-01-01 RX ADMIN — HYDROMORPHONE HYDROCHLORIDE 1 MG: 1 INJECTION, SOLUTION INTRAMUSCULAR; INTRAVENOUS; SUBCUTANEOUS at 23:30

## 2019-01-01 RX ADMIN — SODIUM CHLORIDE, POTASSIUM CHLORIDE, SODIUM LACTATE AND CALCIUM CHLORIDE: 600; 310; 30; 20 INJECTION, SOLUTION INTRAVENOUS at 22:45

## 2019-01-01 RX ADMIN — PANTOPRAZOLE SODIUM 40 MG: 40 TABLET, DELAYED RELEASE ORAL at 08:45

## 2019-01-01 RX ADMIN — Medication 1 CAPSULE: at 09:18

## 2019-01-01 RX ADMIN — SODIUM CHLORIDE 100 ML: 9 INJECTION, SOLUTION INTRAVENOUS at 09:08

## 2019-01-01 RX ADMIN — HYDROCORTISONE: 1 CREAM TOPICAL at 09:08

## 2019-01-01 RX ADMIN — PROPOFOL 140 MCG/KG/MIN: 10 INJECTION, EMULSION INTRAVENOUS at 10:58

## 2019-01-01 RX ADMIN — ARIPIPRAZOLE 10 MG: 5 TABLET ORAL at 11:43

## 2019-01-01 RX ADMIN — POTASSIUM CHLORIDE 20 MEQ: 750 TABLET, FILM COATED, EXTENDED RELEASE ORAL at 10:28

## 2019-01-01 RX ADMIN — METOPROLOL TARTRATE 25 MG: 25 TABLET, FILM COATED ORAL at 09:09

## 2019-01-01 RX ADMIN — CLINDAMYCIN PHOSPHATE 600 MG: 600 INJECTION, SOLUTION INTRAVENOUS at 09:43

## 2019-01-01 RX ADMIN — ENOXAPARIN SODIUM 40 MG: 40 INJECTION SUBCUTANEOUS at 08:39

## 2019-01-01 RX ADMIN — POTASSIUM & SODIUM PHOSPHATES POWDER PACK 280-160-250 MG 250 MG: 280-160-250 PACK at 16:48

## 2019-01-01 RX ADMIN — MORPHINE SULFATE 30 MG: 30 TABLET, FILM COATED, EXTENDED RELEASE ORAL at 09:23

## 2019-01-01 RX ADMIN — PANTOPRAZOLE SODIUM 40 MG: 40 TABLET, DELAYED RELEASE ORAL at 09:57

## 2019-01-01 RX ADMIN — METOPROLOL TARTRATE 25 MG: 25 TABLET, FILM COATED ORAL at 09:39

## 2019-01-01 RX ADMIN — SODIUM PHOSPHATE, MONOBASIC, MONOHYDRATE 20.25 MMOL: 276; 142 INJECTION, SOLUTION INTRAVENOUS at 04:44

## 2019-01-01 RX ADMIN — METOPROLOL TARTRATE 25 MG: 25 TABLET, FILM COATED ORAL at 09:57

## 2019-01-01 RX ADMIN — FENTANYL CITRATE 25 MCG: 50 INJECTION, SOLUTION INTRAMUSCULAR; INTRAVENOUS at 09:23

## 2019-01-01 RX ADMIN — METOPROLOL TARTRATE 25 MG: 25 TABLET, FILM COATED ORAL at 21:45

## 2019-01-01 RX ADMIN — METOPROLOL TARTRATE 25 MG: 25 TABLET, FILM COATED ORAL at 09:23

## 2019-01-01 RX ADMIN — VENLAFAXINE HYDROCHLORIDE 150 MG: 150 CAPSULE, EXTENDED RELEASE ORAL at 10:04

## 2019-01-01 RX ADMIN — POTASSIUM & SODIUM PHOSPHATES POWDER PACK 280-160-250 MG 250 MG: 280-160-250 PACK at 09:31

## 2019-01-01 RX ADMIN — POTASSIUM & SODIUM PHOSPHATES POWDER PACK 280-160-250 MG 250 MG: 280-160-250 PACK at 09:24

## 2019-01-01 RX ADMIN — GLYCOPYRROLATE AND FORMOTEROL FUMARATE 2 PUFF: 9; 4.8 AEROSOL, METERED RESPIRATORY (INHALATION) at 21:45

## 2019-01-01 RX ADMIN — CEFEPIME HYDROCHLORIDE 2 G: 2 INJECTION, POWDER, FOR SOLUTION INTRAVENOUS at 22:19

## 2019-01-01 RX ADMIN — HEPARIN SODIUM IN SODIUM CHLORIDE 7 UNITS/KG/HR: 200 INJECTION INTRAVENOUS at 09:30

## 2019-01-01 RX ADMIN — MORPHINE SULFATE 30 MG: 30 TABLET, FILM COATED, EXTENDED RELEASE ORAL at 06:11

## 2019-01-01 RX ADMIN — MORPHINE SULFATE 30 MG: 30 TABLET, FILM COATED, EXTENDED RELEASE ORAL at 09:18

## 2019-01-01 RX ADMIN — SODIUM CHLORIDE 100 ML: 9 INJECTION, SOLUTION INTRAVENOUS at 10:14

## 2019-01-01 RX ADMIN — TRAZODONE HYDROCHLORIDE 50 MG: 50 TABLET ORAL at 00:04

## 2019-01-01 RX ADMIN — PANTOPRAZOLE SODIUM 40 MG: 40 INJECTION, POWDER, FOR SOLUTION INTRAVENOUS at 17:23

## 2019-01-01 RX ADMIN — OXYCODONE AND ACETAMINOPHEN 1 TABLET: 10; 325 TABLET ORAL at 08:33

## 2019-01-01 RX ADMIN — SODIUM PHOSPHATE, MONOBASIC, MONOHYDRATE 20.25 MMOL: 276; 142 INJECTION, SOLUTION INTRAVENOUS at 09:11

## 2019-01-01 RX ADMIN — SODIUM CHLORIDE: 9 INJECTION, SOLUTION INTRAVENOUS at 11:55

## 2019-01-01 RX ADMIN — MORPHINE SULFATE 30 MG: 30 TABLET, FILM COATED, EXTENDED RELEASE ORAL at 20:20

## 2019-01-01 RX ADMIN — MIDAZOLAM 0.5 MG: 1 INJECTION INTRAMUSCULAR; INTRAVENOUS at 09:46

## 2019-01-01 RX ADMIN — GLYCOPYRROLATE AND FORMOTEROL FUMARATE 2 PUFF: 9; 4.8 AEROSOL, METERED RESPIRATORY (INHALATION) at 09:48

## 2019-01-01 RX ADMIN — OXYCODONE AND ACETAMINOPHEN 1 TABLET: 10; 325 TABLET ORAL at 01:16

## 2019-01-01 RX ADMIN — MORPHINE SULFATE 30 MG: 30 TABLET, FILM COATED, EXTENDED RELEASE ORAL at 14:34

## 2019-01-01 RX ADMIN — ALBUTEROL SULFATE 5 MG: 2.5 SOLUTION RESPIRATORY (INHALATION) at 18:23

## 2019-01-01 RX ADMIN — HYDROCORTISONE: 1 CREAM TOPICAL at 09:05

## 2019-01-01 RX ADMIN — SODIUM CHLORIDE 100 ML: 9 INJECTION, SOLUTION INTRAVENOUS at 09:17

## 2019-01-01 RX ADMIN — Medication 10 ML: at 20:27

## 2019-01-01 RX ADMIN — Medication 10 ML: at 09:59

## 2019-01-01 RX ADMIN — Medication 10 ML: at 10:36

## 2019-01-01 RX ADMIN — Medication 10 ML: at 09:34

## 2019-01-01 RX ADMIN — ATORVASTATIN CALCIUM 20 MG: 20 TABLET, FILM COATED ORAL at 20:36

## 2019-01-01 RX ADMIN — CEFEPIME HYDROCHLORIDE 2 G: 2 INJECTION, POWDER, FOR SOLUTION INTRAVENOUS at 20:20

## 2019-01-01 RX ADMIN — PANTOPRAZOLE SODIUM 40 MG: 40 TABLET, DELAYED RELEASE ORAL at 08:46

## 2019-01-01 RX ADMIN — MORPHINE SULFATE 30 MG: 30 TABLET, FILM COATED, EXTENDED RELEASE ORAL at 08:40

## 2019-01-01 RX ADMIN — METOPROLOL TARTRATE 25 MG: 25 TABLET, FILM COATED ORAL at 08:40

## 2019-01-01 RX ADMIN — PANTOPRAZOLE SODIUM 40 MG: 40 TABLET, DELAYED RELEASE ORAL at 11:51

## 2019-01-01 RX ADMIN — PANTOPRAZOLE SODIUM 40 MG: 40 TABLET, DELAYED RELEASE ORAL at 09:18

## 2019-01-01 RX ADMIN — ENOXAPARIN SODIUM 40 MG: 40 INJECTION SUBCUTANEOUS at 09:11

## 2019-01-01 RX ADMIN — HYDROMORPHONE HYDROCHLORIDE 0.5 MG: 1 INJECTION, SOLUTION INTRAMUSCULAR; INTRAVENOUS; SUBCUTANEOUS at 06:27

## 2019-01-01 RX ADMIN — DIPHENHYDRAMINE HCL 25 MG: 25 TABLET ORAL at 21:52

## 2019-01-01 RX ADMIN — ARIPIPRAZOLE 10 MG: 5 TABLET ORAL at 09:40

## 2019-01-01 RX ADMIN — ENOXAPARIN SODIUM 40 MG: 40 INJECTION SUBCUTANEOUS at 08:34

## 2019-01-01 RX ADMIN — Medication 10 ML: at 21:01

## 2019-01-01 RX ADMIN — POTASSIUM CHLORIDE 20 MEQ: 750 TABLET, FILM COATED, EXTENDED RELEASE ORAL at 11:50

## 2019-01-01 RX ADMIN — LIDOCAINE HYDROCHLORIDE,EPINEPHRINE BITARTRATE 7.5 ML: 10; .01 INJECTION, SOLUTION INFILTRATION; PERINEURAL at 10:07

## 2019-01-01 RX ADMIN — Medication 1 CAPSULE: at 11:11

## 2019-01-01 RX ADMIN — MORPHINE SULFATE 30 MG: 30 TABLET, FILM COATED, EXTENDED RELEASE ORAL at 15:49

## 2019-01-01 RX ADMIN — OXYCODONE AND ACETAMINOPHEN 1 TABLET: 10; 325 TABLET ORAL at 09:18

## 2019-01-01 RX ADMIN — GLYCOPYRROLATE AND FORMOTEROL FUMARATE 2 PUFF: 9; 4.8 AEROSOL, METERED RESPIRATORY (INHALATION) at 08:50

## 2019-01-01 RX ADMIN — PANTOPRAZOLE SODIUM 40 MG: 40 INJECTION, POWDER, FOR SOLUTION INTRAVENOUS at 06:00

## 2019-01-01 RX ADMIN — FENTANYL CITRATE 25 MCG: 50 INJECTION INTRAMUSCULAR; INTRAVENOUS at 09:49

## 2019-01-01 RX ADMIN — GLYCOPYRROLATE AND FORMOTEROL FUMARATE 2 PUFF: 9; 4.8 AEROSOL, METERED RESPIRATORY (INHALATION) at 08:25

## 2019-01-01 RX ADMIN — ALBUTEROL SULFATE 2.5 MG: 2.5 SOLUTION RESPIRATORY (INHALATION) at 04:15

## 2019-01-01 RX ADMIN — METOPROLOL TARTRATE 25 MG: 25 TABLET, FILM COATED ORAL at 08:57

## 2019-01-01 RX ADMIN — HYDROMORPHONE HYDROCHLORIDE 1 MG: 1 INJECTION, SOLUTION INTRAMUSCULAR; INTRAVENOUS; SUBCUTANEOUS at 14:30

## 2019-01-01 RX ADMIN — SODIUM CHLORIDE: 9 INJECTION, SOLUTION INTRAVENOUS at 13:56

## 2019-01-01 RX ADMIN — ARIPIPRAZOLE 10 MG: 5 TABLET ORAL at 09:15

## 2019-01-01 RX ADMIN — DIPHENHYDRAMINE HCL 25 MG: 25 TABLET ORAL at 04:22

## 2019-01-01 RX ADMIN — SODIUM CHLORIDE 1000 ML: 9 INJECTION, SOLUTION INTRAVENOUS at 19:09

## 2019-01-01 RX ADMIN — LORAZEPAM 0.5 MG: 1 TABLET ORAL at 15:12

## 2019-01-01 RX ADMIN — Medication 10 ML: at 10:56

## 2019-01-01 RX ADMIN — ACETAMINOPHEN 650 MG: 325 TABLET, FILM COATED ORAL at 09:02

## 2019-01-01 RX ADMIN — MORPHINE SULFATE 30 MG: 15 TABLET, FILM COATED, EXTENDED RELEASE ORAL at 23:41

## 2019-01-01 RX ADMIN — ENOXAPARIN SODIUM 40 MG: 40 INJECTION SUBCUTANEOUS at 20:07

## 2019-01-01 RX ADMIN — ARIPIPRAZOLE 10 MG: 5 TABLET ORAL at 10:02

## 2019-01-01 RX ADMIN — CEFEPIME HYDROCHLORIDE 2 G: 2 INJECTION, POWDER, FOR SOLUTION INTRAVENOUS at 18:33

## 2019-01-01 RX ADMIN — PROMETHAZINE HYDROCHLORIDE 12.5 MG: 25 INJECTION INTRAMUSCULAR; INTRAVENOUS at 22:11

## 2019-01-01 RX ADMIN — OXYCODONE AND ACETAMINOPHEN 1 TABLET: 10; 325 TABLET ORAL at 01:59

## 2019-01-01 RX ADMIN — METOPROLOL TARTRATE 25 MG: 25 TABLET, FILM COATED ORAL at 10:02

## 2019-01-01 RX ADMIN — Medication 1 CAPSULE: at 08:48

## 2019-01-01 RX ADMIN — PANTOPRAZOLE SODIUM 40 MG: 40 TABLET, DELAYED RELEASE ORAL at 08:28

## 2019-01-01 RX ADMIN — DEXAMETHASONE SODIUM PHOSPHATE 8 MG: 4 INJECTION, SOLUTION INTRAMUSCULAR; INTRAVENOUS at 08:23

## 2019-01-01 RX ADMIN — ALPRAZOLAM 0.5 MG: 0.5 TABLET ORAL at 23:57

## 2019-01-01 RX ADMIN — METOPROLOL TARTRATE 25 MG: 25 TABLET, FILM COATED ORAL at 20:17

## 2019-01-01 RX ADMIN — METOPROLOL TARTRATE 25 MG: 25 TABLET, FILM COATED ORAL at 21:02

## 2019-01-01 RX ADMIN — SODIUM CHLORIDE, POTASSIUM CHLORIDE, SODIUM LACTATE AND CALCIUM CHLORIDE 1000 ML: 600; 310; 30; 20 INJECTION, SOLUTION INTRAVENOUS at 18:38

## 2019-01-01 RX ADMIN — FENTANYL CITRATE 50 MCG: 50 INJECTION, SOLUTION INTRAMUSCULAR; INTRAVENOUS at 09:01

## 2019-01-01 RX ADMIN — GLYCOPYRROLATE AND FORMOTEROL FUMARATE 2 PUFF: 9; 4.8 AEROSOL, METERED RESPIRATORY (INHALATION) at 20:16

## 2019-01-01 RX ADMIN — METOCLOPRAMIDE 10 MG: 5 INJECTION, SOLUTION INTRAMUSCULAR; INTRAVENOUS at 11:28

## 2019-01-01 RX ADMIN — GLYCOPYRROLATE AND FORMOTEROL FUMARATE 2 PUFF: 9; 4.8 AEROSOL, METERED RESPIRATORY (INHALATION) at 08:54

## 2019-01-01 RX ADMIN — Medication 10 ML: at 09:12

## 2019-01-01 RX ADMIN — POTASSIUM CHLORIDE 10 MEQ: 7.46 INJECTION, SOLUTION INTRAVENOUS at 08:21

## 2019-01-01 RX ADMIN — SODIUM CHLORIDE: 9 INJECTION, SOLUTION INTRAVENOUS at 13:44

## 2019-01-01 RX ADMIN — POTASSIUM CHLORIDE 10 MEQ: 7.46 INJECTION, SOLUTION INTRAVENOUS at 09:19

## 2019-01-01 RX ADMIN — MICONAZOLE NITRATE: 20 POWDER TOPICAL at 09:20

## 2019-01-01 RX ADMIN — IOPAMIDOL 75 ML: 755 INJECTION, SOLUTION INTRAVENOUS at 14:36

## 2019-01-01 RX ADMIN — PANTOPRAZOLE SODIUM 40 MG: 40 TABLET, DELAYED RELEASE ORAL at 05:56

## 2019-01-01 RX ADMIN — ENOXAPARIN SODIUM 40 MG: 40 INJECTION SUBCUTANEOUS at 08:46

## 2019-01-01 RX ADMIN — ONDANSETRON 4 MG: 2 INJECTION INTRAMUSCULAR; INTRAVENOUS at 09:34

## 2019-01-01 RX ADMIN — VENLAFAXINE HYDROCHLORIDE 300 MG: 150 CAPSULE, EXTENDED RELEASE ORAL at 10:56

## 2019-01-01 RX ADMIN — MORPHINE SULFATE 30 MG: 30 TABLET, FILM COATED, EXTENDED RELEASE ORAL at 13:50

## 2019-01-01 RX ADMIN — MORPHINE SULFATE 30 MG: 30 TABLET, FILM COATED, EXTENDED RELEASE ORAL at 21:34

## 2019-01-01 RX ADMIN — GLYCOPYRROLATE AND FORMOTEROL FUMARATE 2 PUFF: 9; 4.8 AEROSOL, METERED RESPIRATORY (INHALATION) at 21:02

## 2019-01-01 RX ADMIN — POTASSIUM & SODIUM PHOSPHATES POWDER PACK 280-160-250 MG 250 MG: 280-160-250 PACK at 13:50

## 2019-01-01 RX ADMIN — ASPIRIN 81 MG: 81 TABLET, COATED ORAL at 08:57

## 2019-01-01 RX ADMIN — CIPROFLOXACIN 500 MG: 500 TABLET, FILM COATED ORAL at 09:39

## 2019-01-01 RX ADMIN — Medication 10 ML: at 20:37

## 2019-01-01 RX ADMIN — Medication 10 ML: at 23:03

## 2019-01-01 RX ADMIN — CEFEPIME HYDROCHLORIDE 2 G: 2 INJECTION, POWDER, FOR SOLUTION INTRAVENOUS at 08:58

## 2019-01-01 RX ADMIN — CEFEPIME HYDROCHLORIDE 2 G: 2 INJECTION, POWDER, FOR SOLUTION INTRAVENOUS at 09:16

## 2019-01-01 RX ADMIN — CEFEPIME HYDROCHLORIDE 2 G: 2 INJECTION, POWDER, FOR SOLUTION INTRAVENOUS at 08:44

## 2019-01-01 RX ADMIN — TAMSULOSIN HYDROCHLORIDE 0.4 MG: 0.4 CAPSULE ORAL at 21:01

## 2019-01-01 RX ADMIN — HYDROCORTISONE: 1 CREAM TOPICAL at 23:36

## 2019-01-01 RX ADMIN — HYDROMORPHONE HYDROCHLORIDE 0.5 MG: 1 INJECTION, SOLUTION INTRAMUSCULAR; INTRAVENOUS; SUBCUTANEOUS at 16:54

## 2019-01-01 RX ADMIN — LIDOCAINE HYDROCHLORIDE 5 ML: 20 SOLUTION ORAL; TOPICAL at 15:36

## 2019-01-01 RX ADMIN — ONDANSETRON 4 MG: 2 INJECTION INTRAMUSCULAR; INTRAVENOUS at 10:08

## 2019-01-01 RX ADMIN — VENLAFAXINE HYDROCHLORIDE 300 MG: 150 CAPSULE, EXTENDED RELEASE ORAL at 09:03

## 2019-01-01 RX ADMIN — ARIPIPRAZOLE 10 MG: 5 TABLET ORAL at 09:03

## 2019-01-01 RX ADMIN — ONDANSETRON 4 MG: 2 INJECTION INTRAMUSCULAR; INTRAVENOUS at 01:53

## 2019-01-01 RX ADMIN — POTASSIUM & SODIUM PHOSPHATES POWDER PACK 280-160-250 MG 250 MG: 280-160-250 PACK at 08:12

## 2019-01-01 RX ADMIN — OXYCODONE AND ACETAMINOPHEN 1 TABLET: 10; 325 TABLET ORAL at 06:50

## 2019-01-01 RX ADMIN — ARIPIPRAZOLE 10 MG: 5 TABLET ORAL at 10:30

## 2019-01-01 RX ADMIN — SODIUM CHLORIDE: 9 INJECTION, SOLUTION INTRAVENOUS at 08:56

## 2019-01-01 RX ADMIN — METOPROLOL TARTRATE 25 MG: 25 TABLET, FILM COATED ORAL at 08:17

## 2019-01-01 RX ADMIN — SENNOSIDES 8.6 MG: 8.6 TABLET, FILM COATED ORAL at 21:47

## 2019-01-01 RX ADMIN — Medication 10 ML: at 08:18

## 2019-01-01 RX ADMIN — ENOXAPARIN SODIUM 40 MG: 40 INJECTION SUBCUTANEOUS at 10:55

## 2019-01-01 RX ADMIN — GLYCOPYRROLATE AND FORMOTEROL FUMARATE 2 PUFF: 9; 4.8 AEROSOL, METERED RESPIRATORY (INHALATION) at 09:02

## 2019-01-01 RX ADMIN — OXYCODONE AND ACETAMINOPHEN 1 TABLET: 10; 325 TABLET ORAL at 10:57

## 2019-01-01 RX ADMIN — Medication 10 ML: at 20:28

## 2019-01-01 RX ADMIN — POTASSIUM CHLORIDE 40 MEQ: 1500 TABLET, EXTENDED RELEASE ORAL at 04:46

## 2019-01-01 RX ADMIN — Medication 10 ML: at 21:55

## 2019-01-01 RX ADMIN — VENLAFAXINE HYDROCHLORIDE 150 MG: 150 CAPSULE, EXTENDED RELEASE ORAL at 12:22

## 2019-01-01 RX ADMIN — Medication 10 ML: at 09:08

## 2019-01-01 RX ADMIN — ONDANSETRON 8 MG: 4 TABLET, ORALLY DISINTEGRATING ORAL at 13:09

## 2019-01-01 RX ADMIN — Medication 10 ML: at 08:11

## 2019-01-01 RX ADMIN — ONDANSETRON 8 MG: 2 INJECTION INTRAMUSCULAR; INTRAVENOUS at 17:18

## 2019-01-01 RX ADMIN — OXYCODONE AND ACETAMINOPHEN 1 TABLET: 10; 325 TABLET ORAL at 16:47

## 2019-01-01 RX ADMIN — MORPHINE SULFATE 30 MG: 30 TABLET, FILM COATED, EXTENDED RELEASE ORAL at 08:29

## 2019-01-01 RX ADMIN — CEFEPIME HYDROCHLORIDE 2 G: 2 INJECTION, POWDER, FOR SOLUTION INTRAVENOUS at 08:34

## 2019-01-01 RX ADMIN — METOPROLOL TARTRATE 25 MG: 25 TABLET, FILM COATED ORAL at 08:28

## 2019-01-01 RX ADMIN — MORPHINE SULFATE 30 MG: 30 TABLET, FILM COATED, EXTENDED RELEASE ORAL at 10:35

## 2019-01-01 RX ADMIN — Medication 10 ML: at 08:45

## 2019-01-01 RX ADMIN — DRONABINOL 5 MG: 2.5 CAPSULE ORAL at 17:10

## 2019-01-01 RX ADMIN — THIAMINE HYDROCHLORIDE 100 MG: 100 INJECTION, SOLUTION INTRAMUSCULAR; INTRAVENOUS at 15:04

## 2019-01-01 RX ADMIN — METOPROLOL TARTRATE 25 MG: 25 TABLET, FILM COATED ORAL at 20:27

## 2019-01-01 RX ADMIN — ARIPIPRAZOLE 10 MG: 5 TABLET ORAL at 10:41

## 2019-01-01 RX ADMIN — Medication 10 ML: at 19:59

## 2019-01-01 RX ADMIN — ARIPIPRAZOLE 10 MG: 5 TABLET ORAL at 07:43

## 2019-01-01 RX ADMIN — GLYCOPYRROLATE AND FORMOTEROL FUMARATE 2 PUFF: 9; 4.8 AEROSOL, METERED RESPIRATORY (INHALATION) at 20:42

## 2019-01-01 RX ADMIN — ATORVASTATIN CALCIUM 20 MG: 20 TABLET, FILM COATED ORAL at 20:20

## 2019-01-01 RX ADMIN — BUPIVACAINE HYDROCHLORIDE AND EPINEPHRINE BITARTRATE 7.5 ML: 2.5; .005 INJECTION, SOLUTION EPIDURAL; INFILTRATION; INTRACAUDAL; PERINEURAL at 10:06

## 2019-01-01 RX ADMIN — LORAZEPAM 0.5 MG: 0.5 TABLET ORAL at 11:26

## 2019-01-01 RX ADMIN — DRONABINOL 5 MG: 2.5 CAPSULE ORAL at 20:41

## 2019-01-01 RX ADMIN — ONDANSETRON 8 MG: 4 TABLET, ORALLY DISINTEGRATING ORAL at 11:23

## 2019-01-01 RX ADMIN — METOPROLOL TARTRATE 25 MG: 25 TABLET, FILM COATED ORAL at 21:51

## 2019-01-01 RX ADMIN — METOPROLOL TARTRATE 25 MG: 25 TABLET, FILM COATED ORAL at 20:41

## 2019-01-01 RX ADMIN — PANTOPRAZOLE SODIUM 40 MG: 40 TABLET, DELAYED RELEASE ORAL at 09:24

## 2019-01-01 RX ADMIN — POTASSIUM CHLORIDE 20 MEQ: 750 TABLET, FILM COATED, EXTENDED RELEASE ORAL at 09:03

## 2019-01-01 RX ADMIN — POTASSIUM CHLORIDE 10 MEQ: 7.46 INJECTION, SOLUTION INTRAVENOUS at 12:41

## 2019-01-01 RX ADMIN — LORAZEPAM 1 MG: 1 TABLET ORAL at 22:38

## 2019-01-01 RX ADMIN — ATORVASTATIN CALCIUM 20 MG: 20 TABLET, FILM COATED ORAL at 20:13

## 2019-01-01 RX ADMIN — GLYCOPYRROLATE AND FORMOTEROL FUMARATE 2 PUFF: 9; 4.8 AEROSOL, METERED RESPIRATORY (INHALATION) at 21:10

## 2019-01-01 RX ADMIN — MICONAZOLE NITRATE: 20 POWDER TOPICAL at 10:42

## 2019-01-01 RX ADMIN — LORAZEPAM 1 MG: 1 TABLET ORAL at 22:43

## 2019-01-01 RX ADMIN — Medication 10 ML: at 09:40

## 2019-01-01 RX ADMIN — METOPROLOL TARTRATE 25 MG: 25 TABLET, FILM COATED ORAL at 08:23

## 2019-01-01 RX ADMIN — MORPHINE SULFATE 30 MG: 30 TABLET, FILM COATED, EXTENDED RELEASE ORAL at 04:24

## 2019-01-01 RX ADMIN — OXYCODONE AND ACETAMINOPHEN 1 TABLET: 10; 325 TABLET ORAL at 10:41

## 2019-01-01 RX ADMIN — ONDANSETRON 4 MG: 2 INJECTION INTRAMUSCULAR; INTRAVENOUS at 11:38

## 2019-01-01 RX ADMIN — CEFEPIME HYDROCHLORIDE 2 G: 2 INJECTION, POWDER, FOR SOLUTION INTRAVENOUS at 17:25

## 2019-01-01 RX ADMIN — LORAZEPAM 0.5 MG: 0.5 TABLET ORAL at 00:53

## 2019-01-01 RX ADMIN — POTASSIUM & SODIUM PHOSPHATES POWDER PACK 280-160-250 MG 250 MG: 280-160-250 PACK at 14:27

## 2019-01-01 RX ADMIN — MORPHINE SULFATE 30 MG: 30 TABLET, FILM COATED, EXTENDED RELEASE ORAL at 14:21

## 2019-01-01 RX ADMIN — PROPOFOL 50 MG: 10 INJECTION, EMULSION INTRAVENOUS at 09:04

## 2019-01-01 RX ADMIN — HYDROMORPHONE HYDROCHLORIDE 0.5 MG: 1 INJECTION, SOLUTION INTRAMUSCULAR; INTRAVENOUS; SUBCUTANEOUS at 18:29

## 2019-01-01 RX ADMIN — ALPRAZOLAM 0.5 MG: 0.5 TABLET ORAL at 16:11

## 2019-01-01 RX ADMIN — Medication 10 ML: at 08:54

## 2019-01-01 RX ADMIN — HYDROMORPHONE HYDROCHLORIDE 0.5 MG: 1 INJECTION, SOLUTION INTRAMUSCULAR; INTRAVENOUS; SUBCUTANEOUS at 07:01

## 2019-01-01 RX ADMIN — POTASSIUM CHLORIDE 20 MEQ: 750 TABLET, FILM COATED, EXTENDED RELEASE ORAL at 09:57

## 2019-01-01 RX ADMIN — SODIUM PHOSPHATE, MONOBASIC, MONOHYDRATE 20.25 MMOL: 276; 142 INJECTION, SOLUTION INTRAVENOUS at 11:26

## 2019-01-01 RX ADMIN — Medication 1 PACKET: at 09:31

## 2019-01-01 RX ADMIN — PROPOFOL 80 MG: 10 INJECTION, EMULSION INTRAVENOUS at 09:22

## 2019-01-01 RX ADMIN — ONDANSETRON 4 MG: 2 INJECTION INTRAMUSCULAR; INTRAVENOUS at 10:40

## 2019-01-01 RX ADMIN — ALPRAZOLAM 1 MG: 0.5 TABLET ORAL at 05:24

## 2019-01-01 RX ADMIN — LIDOCAINE HYDROCHLORIDE 100 MG: 20 INJECTION, SOLUTION EPIDURAL; INFILTRATION; INTRACAUDAL; PERINEURAL at 09:01

## 2019-01-01 RX ADMIN — METOPROLOL TARTRATE 25 MG: 25 TABLET, FILM COATED ORAL at 00:52

## 2019-01-01 RX ADMIN — Medication 10 ML: at 11:22

## 2019-01-01 RX ADMIN — ONDANSETRON 8 MG: 2 INJECTION INTRAMUSCULAR; INTRAVENOUS at 15:28

## 2019-01-01 RX ADMIN — POTASSIUM CHLORIDE 10 MEQ: 7.46 INJECTION, SOLUTION INTRAVENOUS at 11:30

## 2019-01-01 RX ADMIN — CEFEPIME HYDROCHLORIDE 2 G: 2 INJECTION, POWDER, FOR SOLUTION INTRAVENOUS at 01:45

## 2019-01-01 RX ADMIN — Medication 10 ML: at 23:41

## 2019-01-01 RX ADMIN — SODIUM CHLORIDE 100 ML: 9 INJECTION, SOLUTION INTRAVENOUS at 08:53

## 2019-01-01 RX ADMIN — MICONAZOLE NITRATE: 20 POWDER TOPICAL at 21:23

## 2019-01-01 RX ADMIN — GLYCOPYRROLATE AND FORMOTEROL FUMARATE 2 PUFF: 9; 4.8 AEROSOL, METERED RESPIRATORY (INHALATION) at 07:33

## 2019-01-01 RX ADMIN — ENOXAPARIN SODIUM 40 MG: 40 INJECTION SUBCUTANEOUS at 09:04

## 2019-01-01 RX ADMIN — ENOXAPARIN SODIUM 40 MG: 40 INJECTION SUBCUTANEOUS at 23:37

## 2019-01-01 RX ADMIN — PANTOPRAZOLE SODIUM 40 MG: 40 TABLET, DELAYED RELEASE ORAL at 11:20

## 2019-01-01 RX ADMIN — CEFEPIME HYDROCHLORIDE 2 G: 2 INJECTION, POWDER, FOR SOLUTION INTRAVENOUS at 19:51

## 2019-01-01 RX ADMIN — VANCOMYCIN HYDROCHLORIDE 1000 MG: 1 INJECTION, SOLUTION INTRAVENOUS at 15:53

## 2019-01-01 RX ADMIN — POTASSIUM & SODIUM PHOSPHATES POWDER PACK 280-160-250 MG 250 MG: 280-160-250 PACK at 17:10

## 2019-01-01 RX ADMIN — ENOXAPARIN SODIUM 40 MG: 40 INJECTION SUBCUTANEOUS at 10:35

## 2019-01-01 RX ADMIN — MORPHINE SULFATE 30 MG: 30 TABLET, FILM COATED, EXTENDED RELEASE ORAL at 11:31

## 2019-01-01 RX ADMIN — ONDANSETRON 8 MG: 4 TABLET, ORALLY DISINTEGRATING ORAL at 16:48

## 2019-01-01 RX ADMIN — ASPIRIN 81 MG 81 MG: 81 TABLET ORAL at 09:11

## 2019-01-01 RX ADMIN — CIPROFLOXACIN 400 MG: 2 INJECTION, SOLUTION INTRAVENOUS at 08:53

## 2019-01-01 RX ADMIN — ACETAMINOPHEN 650 MG: 325 TABLET, FILM COATED ORAL at 22:43

## 2019-01-01 RX ADMIN — CEFEPIME HYDROCHLORIDE 2 G: 2 INJECTION, POWDER, FOR SOLUTION INTRAVENOUS at 01:30

## 2019-01-01 RX ADMIN — GLYCOPYRROLATE AND FORMOTEROL FUMARATE 2 PUFF: 9; 4.8 AEROSOL, METERED RESPIRATORY (INHALATION) at 20:08

## 2019-01-01 RX ADMIN — ATORVASTATIN CALCIUM 20 MG: 20 TABLET, FILM COATED ORAL at 21:34

## 2019-01-01 RX ADMIN — POTASSIUM CHLORIDE 20 MEQ: 750 TABLET, FILM COATED, EXTENDED RELEASE ORAL at 09:11

## 2019-01-01 RX ADMIN — ONDANSETRON 8 MG: 4 TABLET, ORALLY DISINTEGRATING ORAL at 17:34

## 2019-01-01 RX ADMIN — ASPIRIN 81 MG 81 MG: 81 TABLET ORAL at 09:40

## 2019-01-01 RX ADMIN — ONDANSETRON 8 MG: 2 INJECTION INTRAMUSCULAR; INTRAVENOUS at 20:12

## 2019-01-01 RX ADMIN — HYDROCORTISONE: 1 CREAM TOPICAL at 10:52

## 2019-01-01 RX ADMIN — MORPHINE SULFATE 30 MG: 30 TABLET, FILM COATED, EXTENDED RELEASE ORAL at 15:36

## 2019-01-01 RX ADMIN — HYDROMORPHONE HYDROCHLORIDE 1 MG: 1 INJECTION, SOLUTION INTRAMUSCULAR; INTRAVENOUS; SUBCUTANEOUS at 12:43

## 2019-01-01 RX ADMIN — SENNOSIDES 8.6 MG: 8.6 TABLET, FILM COATED ORAL at 20:12

## 2019-01-01 RX ADMIN — MIDAZOLAM 0.5 MG: 1 INJECTION INTRAMUSCULAR; INTRAVENOUS at 09:42

## 2019-01-01 RX ADMIN — VENLAFAXINE HYDROCHLORIDE 300 MG: 150 CAPSULE, EXTENDED RELEASE ORAL at 08:29

## 2019-01-01 RX ADMIN — VENLAFAXINE HYDROCHLORIDE 300 MG: 150 CAPSULE, EXTENDED RELEASE ORAL at 08:57

## 2019-01-01 RX ADMIN — ATORVASTATIN CALCIUM 10 MG: 10 TABLET, FILM COATED ORAL at 21:01

## 2019-01-01 RX ADMIN — PROMETHAZINE HYDROCHLORIDE 12.5 MG: 25 INJECTION INTRAMUSCULAR; INTRAVENOUS at 13:55

## 2019-01-01 RX ADMIN — METOPROLOL TARTRATE 25 MG: 25 TABLET, FILM COATED ORAL at 21:34

## 2019-01-01 RX ADMIN — MORPHINE SULFATE 30 MG: 30 TABLET, FILM COATED, EXTENDED RELEASE ORAL at 08:57

## 2019-01-01 RX ADMIN — ENOXAPARIN SODIUM 40 MG: 40 INJECTION SUBCUTANEOUS at 08:17

## 2019-01-01 RX ADMIN — OXYCODONE AND ACETAMINOPHEN 1 TABLET: 10; 325 TABLET ORAL at 07:44

## 2019-01-01 RX ADMIN — SENNOSIDES 8.6 MG: 8.6 TABLET, FILM COATED ORAL at 21:02

## 2019-01-01 RX ADMIN — POTASSIUM CHLORIDE 40 MEQ: 1500 TABLET, EXTENDED RELEASE ORAL at 10:41

## 2019-01-01 RX ADMIN — THIAMINE HYDROCHLORIDE 100 MG: 100 INJECTION, SOLUTION INTRAMUSCULAR; INTRAVENOUS at 17:25

## 2019-01-01 RX ADMIN — Medication 10 ML: at 21:52

## 2019-01-01 RX ADMIN — POTASSIUM CHLORIDE 20 MEQ: 750 TABLET, FILM COATED, EXTENDED RELEASE ORAL at 09:10

## 2019-01-01 RX ADMIN — Medication: at 10:08

## 2019-01-01 RX ADMIN — GLYCOPYRROLATE AND FORMOTEROL FUMARATE 2 PUFF: 9; 4.8 AEROSOL, METERED RESPIRATORY (INHALATION) at 19:41

## 2019-01-01 RX ADMIN — ASPIRIN 81 MG: 81 TABLET, COATED ORAL at 09:15

## 2019-01-01 RX ADMIN — OXYCODONE HYDROCHLORIDE AND ACETAMINOPHEN 1 TABLET: 10; 325 TABLET ORAL at 09:34

## 2019-01-01 RX ADMIN — SODIUM CHLORIDE 250 ML: 9 INJECTION, SOLUTION INTRAVENOUS at 15:36

## 2019-01-01 RX ADMIN — ASPIRIN 81 MG 81 MG: 81 TABLET ORAL at 08:29

## 2019-01-01 RX ADMIN — ATORVASTATIN CALCIUM 20 MG: 20 TABLET, FILM COATED ORAL at 20:08

## 2019-01-01 RX ADMIN — VENLAFAXINE HYDROCHLORIDE 300 MG: 150 CAPSULE, EXTENDED RELEASE ORAL at 08:34

## 2019-01-01 RX ADMIN — DRONABINOL 5 MG: 2.5 CAPSULE ORAL at 09:03

## 2019-01-01 RX ADMIN — MORPHINE SULFATE 30 MG: 30 TABLET, FILM COATED, EXTENDED RELEASE ORAL at 23:02

## 2019-01-01 RX ADMIN — PROPOFOL 150 MG: 10 INJECTION, EMULSION INTRAVENOUS at 09:01

## 2019-01-01 RX ADMIN — ONDANSETRON 8 MG: 2 INJECTION INTRAMUSCULAR; INTRAVENOUS at 09:49

## 2019-01-01 RX ADMIN — Medication 1 PACKET: at 08:11

## 2019-01-01 RX ADMIN — POTASSIUM CHLORIDE 20 MEQ: 750 TABLET, FILM COATED, EXTENDED RELEASE ORAL at 08:57

## 2019-01-01 RX ADMIN — SODIUM CHLORIDE: 9 INJECTION, SOLUTION INTRAVENOUS at 00:42

## 2019-01-01 RX ADMIN — DRONABINOL 2.5 MG: 2.5 CAPSULE ORAL at 11:23

## 2019-01-01 RX ADMIN — POTASSIUM & SODIUM PHOSPHATES POWDER PACK 280-160-250 MG 250 MG: 280-160-250 PACK at 18:17

## 2019-01-01 RX ADMIN — ENOXAPARIN SODIUM 40 MG: 40 INJECTION SUBCUTANEOUS at 09:58

## 2019-01-01 RX ADMIN — POTASSIUM CHLORIDE 20 MEQ: 750 TABLET, FILM COATED, EXTENDED RELEASE ORAL at 10:56

## 2019-01-01 RX ADMIN — DEXAMETHASONE 2 MG: 4 TABLET ORAL at 11:20

## 2019-01-01 RX ADMIN — LORAZEPAM 1 MG: 1 TABLET ORAL at 15:07

## 2019-01-01 RX ADMIN — LORAZEPAM 1 MG: 1 TABLET ORAL at 07:43

## 2019-01-01 RX ADMIN — METOPROLOL TARTRATE 25 MG: 25 TABLET, FILM COATED ORAL at 11:43

## 2019-01-01 RX ADMIN — MORPHINE SULFATE 30 MG: 30 TABLET, FILM COATED, EXTENDED RELEASE ORAL at 20:07

## 2019-01-01 RX ADMIN — MICONAZOLE NITRATE: 20 POWDER TOPICAL at 08:55

## 2019-01-01 RX ADMIN — LORAZEPAM 0.5 MG: 1 TABLET ORAL at 21:46

## 2019-01-01 RX ADMIN — TBO-FILGRASTIM 300 MCG: 300 INJECTION, SOLUTION SUBCUTANEOUS at 17:26

## 2019-01-01 RX ADMIN — VANCOMYCIN HYDROCHLORIDE 1000 MG: 1 INJECTION, SOLUTION INTRAVENOUS at 00:04

## 2019-01-01 RX ADMIN — VENLAFAXINE HYDROCHLORIDE 300 MG: 150 CAPSULE, EXTENDED RELEASE ORAL at 08:17

## 2019-01-01 RX ADMIN — OXYCODONE AND ACETAMINOPHEN 1 TABLET: 10; 325 TABLET ORAL at 21:50

## 2019-01-01 RX ADMIN — POTASSIUM & SODIUM PHOSPHATES POWDER PACK 280-160-250 MG 250 MG: 280-160-250 PACK at 20:27

## 2019-01-01 RX ADMIN — POTASSIUM & SODIUM PHOSPHATES POWDER PACK 280-160-250 MG 250 MG: 280-160-250 PACK at 20:49

## 2019-01-01 RX ADMIN — ACETAMINOPHEN 650 MG: 325 TABLET, FILM COATED ORAL at 15:50

## 2019-01-01 RX ADMIN — GLYCOPYRROLATE AND FORMOTEROL FUMARATE 2 PUFF: 9; 4.8 AEROSOL, METERED RESPIRATORY (INHALATION) at 20:15

## 2019-01-01 RX ADMIN — VANCOMYCIN HYDROCHLORIDE 1000 MG: 1 INJECTION, SOLUTION INTRAVENOUS at 02:43

## 2019-01-01 RX ADMIN — GLYCOPYRROLATE AND FORMOTEROL FUMARATE 2 PUFF: 9; 4.8 AEROSOL, METERED RESPIRATORY (INHALATION) at 21:04

## 2019-01-01 RX ADMIN — SODIUM CHLORIDE 100 ML: 9 INJECTION, SOLUTION INTRAVENOUS at 08:59

## 2019-01-01 RX ADMIN — ACETAMINOPHEN 500 MG: 500 TABLET, FILM COATED ORAL at 19:09

## 2019-01-01 RX ADMIN — GLYCOPYRROLATE AND FORMOTEROL FUMARATE 2 PUFF: 9; 4.8 AEROSOL, METERED RESPIRATORY (INHALATION) at 09:46

## 2019-01-01 RX ADMIN — CEFEPIME HYDROCHLORIDE 2 G: 2 INJECTION, POWDER, FOR SOLUTION INTRAVENOUS at 22:05

## 2019-01-01 RX ADMIN — POTASSIUM & SODIUM PHOSPHATES POWDER PACK 280-160-250 MG 250 MG: 280-160-250 PACK at 23:03

## 2019-01-01 RX ADMIN — METOPROLOL TARTRATE 25 MG: 25 TABLET, FILM COATED ORAL at 23:02

## 2019-01-01 RX ADMIN — CEFEPIME HYDROCHLORIDE 2 G: 2 INJECTION, POWDER, FOR SOLUTION INTRAVENOUS at 08:30

## 2019-01-01 RX ADMIN — Medication 10 ML: at 22:41

## 2019-01-01 RX ADMIN — MORPHINE SULFATE 30 MG: 30 TABLET, FILM COATED, EXTENDED RELEASE ORAL at 13:03

## 2019-01-01 RX ADMIN — Medication 10 ML: at 09:09

## 2019-01-01 RX ADMIN — METOPROLOL TARTRATE 25 MG: 25 TABLET, FILM COATED ORAL at 22:09

## 2019-01-01 RX ADMIN — ATORVASTATIN CALCIUM 20 MG: 20 TABLET, FILM COATED ORAL at 20:17

## 2019-01-01 RX ADMIN — HYDROCORTISONE: 1 CREAM TOPICAL at 22:04

## 2019-01-01 RX ADMIN — VENLAFAXINE HYDROCHLORIDE 300 MG: 150 CAPSULE, EXTENDED RELEASE ORAL at 11:46

## 2019-01-01 RX ADMIN — OXYCODONE AND ACETAMINOPHEN 1 TABLET: 10; 325 TABLET ORAL at 09:33

## 2019-01-01 RX ADMIN — CIPROFLOXACIN 400 MG: 2 INJECTION, SOLUTION INTRAVENOUS at 10:50

## 2019-01-01 RX ADMIN — POTASSIUM & SODIUM PHOSPHATES POWDER PACK 280-160-250 MG 250 MG: 280-160-250 PACK at 20:17

## 2019-01-01 RX ADMIN — VENLAFAXINE HYDROCHLORIDE 300 MG: 150 CAPSULE, EXTENDED RELEASE ORAL at 09:25

## 2019-01-01 RX ADMIN — Medication 10 ML: at 20:13

## 2019-01-01 RX ADMIN — ATORVASTATIN CALCIUM 20 MG: 20 TABLET, FILM COATED ORAL at 00:52

## 2019-01-01 RX ADMIN — GLYCOPYRROLATE AND FORMOTEROL FUMARATE 2 PUFF: 9; 4.8 AEROSOL, METERED RESPIRATORY (INHALATION) at 09:24

## 2019-01-01 RX ADMIN — ENOXAPARIN SODIUM 40 MG: 40 INJECTION SUBCUTANEOUS at 09:39

## 2019-01-01 RX ADMIN — LORAZEPAM 1 MG: 1 TABLET ORAL at 08:12

## 2019-01-01 RX ADMIN — CEFEPIME HYDROCHLORIDE 2 G: 2 INJECTION, POWDER, FOR SOLUTION INTRAVENOUS at 20:34

## 2019-01-01 RX ADMIN — ARIPIPRAZOLE 10 MG: 5 TABLET ORAL at 09:11

## 2019-01-01 RX ADMIN — ALBUTEROL SULFATE 5 MG: 2.5 SOLUTION RESPIRATORY (INHALATION) at 07:34

## 2019-01-01 RX ADMIN — ARIPIPRAZOLE 10 MG: 5 TABLET ORAL at 08:34

## 2019-01-01 RX ADMIN — HYDROCORTISONE: 1 CREAM TOPICAL at 20:29

## 2019-01-01 RX ADMIN — ERGOCALCIFEROL 50000 UNITS: 1.25 CAPSULE ORAL at 11:23

## 2019-01-01 RX ADMIN — MORPHINE SULFATE 30 MG: 30 TABLET, FILM COATED, EXTENDED RELEASE ORAL at 22:04

## 2019-01-01 RX ADMIN — Medication 10 ML: at 20:53

## 2019-01-01 RX ADMIN — ENOXAPARIN SODIUM 40 MG: 40 INJECTION SUBCUTANEOUS at 09:09

## 2019-01-01 RX ADMIN — SODIUM CHLORIDE: 9 INJECTION, SOLUTION INTRAVENOUS at 09:30

## 2019-01-01 RX ADMIN — PANTOPRAZOLE SODIUM 40 MG: 40 TABLET, DELAYED RELEASE ORAL at 08:40

## 2019-01-01 RX ADMIN — GLYCOPYRROLATE AND FORMOTEROL FUMARATE 2 PUFF: 9; 4.8 AEROSOL, METERED RESPIRATORY (INHALATION) at 20:47

## 2019-01-01 RX ADMIN — ONDANSETRON 8 MG: 2 INJECTION INTRAMUSCULAR; INTRAVENOUS at 23:54

## 2019-01-01 RX ADMIN — ATORVASTATIN CALCIUM 20 MG: 20 TABLET, FILM COATED ORAL at 21:52

## 2019-01-01 RX ADMIN — ACETAMINOPHEN 650 MG: 325 TABLET, FILM COATED ORAL at 00:52

## 2019-01-01 RX ADMIN — ONDANSETRON 8 MG: 2 INJECTION INTRAMUSCULAR; INTRAVENOUS at 08:46

## 2019-01-01 RX ADMIN — LORAZEPAM 1 MG: 1 TABLET ORAL at 22:03

## 2019-01-01 RX ADMIN — MORPHINE SULFATE 30 MG: 30 TABLET, FILM COATED, EXTENDED RELEASE ORAL at 20:12

## 2019-01-01 RX ADMIN — OXYCODONE AND ACETAMINOPHEN 1 TABLET: 10; 325 TABLET ORAL at 19:16

## 2019-01-01 RX ADMIN — ATORVASTATIN CALCIUM 20 MG: 20 TABLET, FILM COATED ORAL at 20:12

## 2019-01-01 RX ADMIN — LORAZEPAM 1 MG: 1 TABLET ORAL at 22:04

## 2019-01-01 RX ADMIN — METOPROLOL TARTRATE 25 MG: 25 TABLET, FILM COATED ORAL at 19:59

## 2019-01-01 RX ADMIN — ONDANSETRON 4 MG: 2 INJECTION INTRAMUSCULAR; INTRAVENOUS at 18:36

## 2019-01-01 RX ADMIN — THIAMINE HYDROCHLORIDE 100 MG: 100 INJECTION, SOLUTION INTRAMUSCULAR; INTRAVENOUS at 18:16

## 2019-01-01 RX ADMIN — CEFEPIME HYDROCHLORIDE 2 G: 2 INJECTION, POWDER, FOR SOLUTION INTRAVENOUS at 08:11

## 2019-01-01 RX ADMIN — POTASSIUM & SODIUM PHOSPHATES POWDER PACK 280-160-250 MG 250 MG: 280-160-250 PACK at 09:04

## 2019-01-01 RX ADMIN — ATORVASTATIN CALCIUM 20 MG: 20 TABLET, FILM COATED ORAL at 22:02

## 2019-01-01 RX ADMIN — ONDANSETRON 8 MG: 2 INJECTION INTRAMUSCULAR; INTRAVENOUS at 08:44

## 2019-01-01 RX ADMIN — Medication 10 ML: at 21:34

## 2019-01-01 RX ADMIN — ATORVASTATIN CALCIUM 20 MG: 20 TABLET, FILM COATED ORAL at 20:07

## 2019-01-01 RX ADMIN — ENOXAPARIN SODIUM 40 MG: 40 INJECTION SUBCUTANEOUS at 20:49

## 2019-01-01 RX ADMIN — MORPHINE SULFATE 30 MG: 30 TABLET, FILM COATED, EXTENDED RELEASE ORAL at 09:40

## 2019-01-01 RX ADMIN — GLYCOPYRROLATE AND FORMOTEROL FUMARATE 2 PUFF: 9; 4.8 AEROSOL, METERED RESPIRATORY (INHALATION) at 21:28

## 2019-01-01 RX ADMIN — PROMETHAZINE HYDROCHLORIDE 12.5 MG: 25 INJECTION INTRAMUSCULAR; INTRAVENOUS at 15:20

## 2019-01-01 RX ADMIN — ATORVASTATIN CALCIUM 20 MG: 20 TABLET, FILM COATED ORAL at 22:06

## 2019-01-01 RX ADMIN — IPRATROPIUM BROMIDE AND ALBUTEROL SULFATE 1 AMPULE: .5; 3 SOLUTION RESPIRATORY (INHALATION) at 01:54

## 2019-01-01 RX ADMIN — ALPRAZOLAM 0.5 MG: 0.5 TABLET ORAL at 08:21

## 2019-01-01 RX ADMIN — TRAZODONE HYDROCHLORIDE 50 MG: 50 TABLET ORAL at 21:34

## 2019-01-01 RX ADMIN — ATORVASTATIN CALCIUM 20 MG: 20 TABLET, FILM COATED ORAL at 20:48

## 2019-01-01 RX ADMIN — GLYCOPYRROLATE AND FORMOTEROL FUMARATE 2 PUFF: 9; 4.8 AEROSOL, METERED RESPIRATORY (INHALATION) at 08:43

## 2019-01-01 RX ADMIN — Medication 10 ML: at 01:49

## 2019-01-01 RX ADMIN — VENLAFAXINE HYDROCHLORIDE 300 MG: 150 CAPSULE, EXTENDED RELEASE ORAL at 09:39

## 2019-01-01 RX ADMIN — MORPHINE SULFATE 30 MG: 30 TABLET, FILM COATED, EXTENDED RELEASE ORAL at 10:41

## 2019-01-01 RX ADMIN — POTASSIUM CHLORIDE 20 MEQ: 750 TABLET, FILM COATED, EXTENDED RELEASE ORAL at 11:11

## 2019-01-01 RX ADMIN — ARIPIPRAZOLE 10 MG: 5 TABLET ORAL at 08:57

## 2019-01-01 RX ADMIN — ACETAMINOPHEN 650 MG: 325 TABLET, FILM COATED ORAL at 00:34

## 2019-01-01 RX ADMIN — ONDANSETRON 4 MG: 2 INJECTION INTRAMUSCULAR; INTRAVENOUS at 12:43

## 2019-01-01 RX ADMIN — MORPHINE SULFATE 30 MG: 15 TABLET, FILM COATED, EXTENDED RELEASE ORAL at 15:09

## 2019-01-01 RX ADMIN — METOPROLOL TARTRATE 25 MG: 25 TABLET, FILM COATED ORAL at 08:45

## 2019-01-01 RX ADMIN — OXYCODONE AND ACETAMINOPHEN 1 TABLET: 10; 325 TABLET ORAL at 13:51

## 2019-01-01 RX ADMIN — HYDROCORTISONE: 1 CREAM TOPICAL at 20:06

## 2019-01-01 RX ADMIN — ATORVASTATIN CALCIUM 20 MG: 20 TABLET, FILM COATED ORAL at 01:37

## 2019-01-01 RX ADMIN — HYDROCORTISONE: 1 CREAM TOPICAL at 20:27

## 2019-01-01 RX ADMIN — CIPROFLOXACIN 500 MG: 500 TABLET, FILM COATED ORAL at 21:46

## 2019-01-01 RX ADMIN — ATORVASTATIN CALCIUM 10 MG: 10 TABLET, FILM COATED ORAL at 22:09

## 2019-01-01 RX ADMIN — SODIUM PHOSPHATE, MONOBASIC, MONOHYDRATE 10.14 MMOL: 276; 142 INJECTION, SOLUTION INTRAVENOUS at 12:12

## 2019-01-01 RX ADMIN — VENLAFAXINE HYDROCHLORIDE 300 MG: 150 CAPSULE, EXTENDED RELEASE ORAL at 10:41

## 2019-01-01 RX ADMIN — SODIUM CHLORIDE, POTASSIUM CHLORIDE, SODIUM LACTATE AND CALCIUM CHLORIDE: 600; 310; 30; 20 INJECTION, SOLUTION INTRAVENOUS at 08:23

## 2019-01-01 RX ADMIN — OXYCODONE AND ACETAMINOPHEN 1 TABLET: 10; 325 TABLET ORAL at 11:29

## 2019-01-01 RX ADMIN — LORAZEPAM 0.5 MG: 1 TABLET ORAL at 15:08

## 2019-01-01 RX ADMIN — SODIUM CHLORIDE: 9 INJECTION, SOLUTION INTRAVENOUS at 02:54

## 2019-01-01 RX ADMIN — Medication 1 CAPSULE: at 09:03

## 2019-01-01 RX ADMIN — PROMETHAZINE HYDROCHLORIDE 12.5 MG: 25 INJECTION INTRAMUSCULAR; INTRAVENOUS at 05:02

## 2019-01-01 RX ADMIN — POTASSIUM PHOSPHATE, MONOBASIC AND POTASSIUM PHOSPHATE, DIBASIC 20 MMOL: 224; 236 INJECTION, SOLUTION, CONCENTRATE INTRAVENOUS at 11:48

## 2019-01-01 RX ADMIN — ARIPIPRAZOLE 10 MG: 5 TABLET ORAL at 11:19

## 2019-01-01 RX ADMIN — Medication 100 MG: at 09:10

## 2019-01-01 RX ADMIN — GLYCOPYRROLATE AND FORMOTEROL FUMARATE 2 PUFF: 9; 4.8 AEROSOL, METERED RESPIRATORY (INHALATION) at 08:52

## 2019-01-01 RX ADMIN — POTASSIUM & SODIUM PHOSPHATES POWDER PACK 280-160-250 MG 250 MG: 280-160-250 PACK at 13:03

## 2019-01-01 RX ADMIN — SODIUM CHLORIDE 7 ML: 9 INJECTION, SOLUTION INTRAMUSCULAR; INTRAVENOUS; SUBCUTANEOUS at 10:08

## 2019-01-01 RX ADMIN — Medication 1 CAPSULE: at 10:15

## 2019-01-01 RX ADMIN — Medication 10 ML: at 08:48

## 2019-01-01 RX ADMIN — METOPROLOL TARTRATE 25 MG: 25 TABLET, FILM COATED ORAL at 21:59

## 2019-01-01 RX ADMIN — DEXAMETHASONE 2 MG: 4 TABLET ORAL at 09:57

## 2019-01-01 RX ADMIN — MORPHINE SULFATE 30 MG: 30 TABLET, FILM COATED, EXTENDED RELEASE ORAL at 06:17

## 2019-01-01 RX ADMIN — GLYCOPYRROLATE AND FORMOTEROL FUMARATE 2 PUFF: 9; 4.8 AEROSOL, METERED RESPIRATORY (INHALATION) at 07:53

## 2019-01-01 RX ADMIN — PANTOPRAZOLE SODIUM 40 MG: 40 TABLET, DELAYED RELEASE ORAL at 06:26

## 2019-01-01 RX ADMIN — HYDROCORTISONE: 1 CREAM TOPICAL at 10:20

## 2019-01-01 RX ADMIN — ENOXAPARIN SODIUM 40 MG: 40 INJECTION SUBCUTANEOUS at 22:09

## 2019-01-01 RX ADMIN — VENLAFAXINE HYDROCHLORIDE 300 MG: 150 CAPSULE, EXTENDED RELEASE ORAL at 11:50

## 2019-01-01 RX ADMIN — DRONABINOL 5 MG: 2.5 CAPSULE ORAL at 06:35

## 2019-01-01 RX ADMIN — MORPHINE SULFATE 30 MG: 15 TABLET, FILM COATED, EXTENDED RELEASE ORAL at 23:36

## 2019-01-01 RX ADMIN — GLYCOPYRROLATE AND FORMOTEROL FUMARATE 2 PUFF: 9; 4.8 AEROSOL, METERED RESPIRATORY (INHALATION) at 07:47

## 2019-01-01 RX ADMIN — LORAZEPAM 1 MG: 1 TABLET ORAL at 09:43

## 2019-01-01 RX ADMIN — PROMETHAZINE HYDROCHLORIDE 12.5 MG: 25 INJECTION INTRAMUSCULAR; INTRAVENOUS at 18:38

## 2019-01-01 RX ADMIN — HYDROMORPHONE HYDROCHLORIDE 1 MG: 1 INJECTION, SOLUTION INTRAMUSCULAR; INTRAVENOUS; SUBCUTANEOUS at 11:28

## 2019-01-01 RX ADMIN — HYDROMORPHONE HYDROCHLORIDE 1 MG: 1 INJECTION, SOLUTION INTRAMUSCULAR; INTRAVENOUS; SUBCUTANEOUS at 17:21

## 2019-01-01 RX ADMIN — ATORVASTATIN CALCIUM 20 MG: 20 TABLET, FILM COATED ORAL at 23:02

## 2019-01-01 RX ADMIN — MORPHINE SULFATE 30 MG: 30 TABLET, FILM COATED, EXTENDED RELEASE ORAL at 23:26

## 2019-01-01 RX ADMIN — Medication 1 CAPSULE: at 08:57

## 2019-01-01 RX ADMIN — ENOXAPARIN SODIUM 40 MG: 40 INJECTION SUBCUTANEOUS at 21:00

## 2019-01-01 RX ADMIN — ARIPIPRAZOLE 10 MG: 5 TABLET ORAL at 08:45

## 2019-01-01 RX ADMIN — Medication 10 ML: at 10:03

## 2019-01-01 RX ADMIN — CEFEPIME HYDROCHLORIDE 2 G: 2 INJECTION, POWDER, FOR SOLUTION INTRAVENOUS at 01:44

## 2019-01-01 RX ADMIN — VANCOMYCIN HYDROCHLORIDE 1000 MG: 1 INJECTION, SOLUTION INTRAVENOUS at 15:47

## 2019-01-01 RX ADMIN — METOPROLOL TARTRATE 25 MG: 25 TABLET, FILM COATED ORAL at 09:24

## 2019-01-01 RX ADMIN — HYDROCORTISONE: 1 CREAM TOPICAL at 21:39

## 2019-01-01 RX ADMIN — MORPHINE SULFATE 30 MG: 30 TABLET, FILM COATED, EXTENDED RELEASE ORAL at 09:25

## 2019-01-01 RX ADMIN — PANTOPRAZOLE SODIUM 40 MG: 40 TABLET, DELAYED RELEASE ORAL at 05:39

## 2019-01-01 RX ADMIN — Medication 10 ML: at 20:14

## 2019-01-01 RX ADMIN — ONDANSETRON 4 MG: 2 INJECTION INTRAMUSCULAR; INTRAVENOUS at 10:29

## 2019-01-01 RX ADMIN — Medication 10 ML: at 09:24

## 2019-01-01 RX ADMIN — ASPIRIN 81 MG: 81 TABLET, COATED ORAL at 09:04

## 2019-01-01 RX ADMIN — METOPROLOL TARTRATE 25 MG: 25 TABLET, FILM COATED ORAL at 21:52

## 2019-01-01 RX ADMIN — ASPIRIN 81 MG: 81 TABLET, COATED ORAL at 10:15

## 2019-01-01 RX ADMIN — SODIUM CHLORIDE: 9 INJECTION, SOLUTION INTRAVENOUS at 21:10

## 2019-01-01 RX ADMIN — ASPIRIN 81 MG: 81 TABLET, COATED ORAL at 07:43

## 2019-01-01 RX ADMIN — METOPROLOL TARTRATE 25 MG: 25 TABLET, FILM COATED ORAL at 20:20

## 2019-01-01 RX ADMIN — METOPROLOL TARTRATE 25 MG: 25 TABLET, FILM COATED ORAL at 08:11

## 2019-01-01 RX ADMIN — LORAZEPAM 1 MG: 1 TABLET ORAL at 20:17

## 2019-01-01 RX ADMIN — MORPHINE SULFATE 30 MG: 30 TABLET, FILM COATED, EXTENDED RELEASE ORAL at 23:27

## 2019-01-01 RX ADMIN — MORPHINE SULFATE 30 MG: 30 TABLET, FILM COATED, EXTENDED RELEASE ORAL at 20:13

## 2019-01-01 RX ADMIN — OXYCODONE AND ACETAMINOPHEN 1 TABLET: 10; 325 TABLET ORAL at 07:39

## 2019-01-01 RX ADMIN — ONDANSETRON 4 MG: 2 INJECTION INTRAMUSCULAR; INTRAVENOUS at 19:01

## 2019-01-01 RX ADMIN — OXYCODONE AND ACETAMINOPHEN 1 TABLET: 10; 325 TABLET ORAL at 08:45

## 2019-01-01 RX ADMIN — PROMETHAZINE HYDROCHLORIDE 12.5 MG: 25 INJECTION INTRAMUSCULAR; INTRAVENOUS at 10:30

## 2019-01-01 RX ADMIN — METOPROLOL TARTRATE 25 MG: 25 TABLET, FILM COATED ORAL at 10:57

## 2019-01-01 RX ADMIN — Medication 10 ML: at 08:21

## 2019-01-01 RX ADMIN — ATORVASTATIN CALCIUM 20 MG: 20 TABLET, FILM COATED ORAL at 21:02

## 2019-01-01 RX ADMIN — PANTOPRAZOLE SODIUM 40 MG: 40 TABLET, DELAYED RELEASE ORAL at 10:32

## 2019-01-01 RX ADMIN — MORPHINE SULFATE 30 MG: 15 TABLET, FILM COATED, EXTENDED RELEASE ORAL at 11:20

## 2019-01-01 RX ADMIN — ENOXAPARIN SODIUM 40 MG: 40 INJECTION SUBCUTANEOUS at 07:43

## 2019-01-01 RX ADMIN — POTASSIUM CHLORIDE 40 MEQ: 1500 TABLET, EXTENDED RELEASE ORAL at 14:45

## 2019-01-01 RX ADMIN — TRAZODONE HYDROCHLORIDE 50 MG: 50 TABLET ORAL at 00:14

## 2019-01-01 RX ADMIN — MORPHINE SULFATE 30 MG: 30 TABLET, FILM COATED, EXTENDED RELEASE ORAL at 21:01

## 2019-01-01 RX ADMIN — GLYCOPYRROLATE AND FORMOTEROL FUMARATE 2 PUFF: 9; 4.8 AEROSOL, METERED RESPIRATORY (INHALATION) at 21:00

## 2019-01-01 RX ADMIN — VENLAFAXINE HYDROCHLORIDE 300 MG: 150 CAPSULE, EXTENDED RELEASE ORAL at 07:43

## 2019-01-01 RX ADMIN — Medication 10 ML: at 22:11

## 2019-01-01 RX ADMIN — VENLAFAXINE HYDROCHLORIDE 300 MG: 150 CAPSULE, EXTENDED RELEASE ORAL at 10:15

## 2019-01-01 RX ADMIN — ONDANSETRON 4 MG: 2 INJECTION INTRAMUSCULAR; INTRAVENOUS at 01:22

## 2019-01-01 RX ADMIN — MORPHINE SULFATE 30 MG: 30 TABLET, FILM COATED, EXTENDED RELEASE ORAL at 14:27

## 2019-01-01 RX ADMIN — ONDANSETRON 8 MG: 2 INJECTION INTRAMUSCULAR; INTRAVENOUS at 08:37

## 2019-01-01 RX ADMIN — SODIUM CHLORIDE 1000 ML: 9 INJECTION, SOLUTION INTRAVENOUS at 15:38

## 2019-01-01 RX ADMIN — METOPROLOL TARTRATE 25 MG: 25 TABLET, FILM COATED ORAL at 08:21

## 2019-01-01 RX ADMIN — Medication 1 CAPSULE: at 10:33

## 2019-01-01 RX ADMIN — ENOXAPARIN SODIUM 40 MG: 40 INJECTION SUBCUTANEOUS at 20:26

## 2019-01-01 RX ADMIN — VANCOMYCIN HYDROCHLORIDE 1000 MG: 1 INJECTION, SOLUTION INTRAVENOUS at 13:06

## 2019-01-01 RX ADMIN — MORPHINE SULFATE 30 MG: 30 TABLET, FILM COATED, EXTENDED RELEASE ORAL at 09:30

## 2019-01-01 RX ADMIN — MICONAZOLE NITRATE: 20 POWDER TOPICAL at 08:45

## 2019-01-01 RX ADMIN — ONDANSETRON 4 MG: 2 INJECTION INTRAMUSCULAR; INTRAVENOUS at 13:16

## 2019-01-01 RX ADMIN — METOPROLOL TARTRATE 25 MG: 25 TABLET, FILM COATED ORAL at 01:37

## 2019-01-01 RX ADMIN — SODIUM PHOSPHATE, MONOBASIC, MONOHYDRATE 30 MMOL: 276; 142 INJECTION, SOLUTION INTRAVENOUS at 09:17

## 2019-01-01 RX ADMIN — Medication 10 ML: at 22:04

## 2019-01-01 RX ADMIN — Medication 10 ML: at 02:19

## 2019-01-01 RX ADMIN — GLYCOPYRROLATE AND FORMOTEROL FUMARATE 2 PUFF: 9; 4.8 AEROSOL, METERED RESPIRATORY (INHALATION) at 20:41

## 2019-01-01 RX ADMIN — POTASSIUM & SODIUM PHOSPHATES POWDER PACK 280-160-250 MG 250 MG: 280-160-250 PACK at 16:26

## 2019-01-01 RX ADMIN — ARIPIPRAZOLE 10 MG: 5 TABLET ORAL at 10:25

## 2019-01-01 RX ADMIN — Medication 10 ML: at 22:05

## 2019-01-01 RX ADMIN — HYDROCORTISONE: 1 CREAM TOPICAL at 20:41

## 2019-01-01 RX ADMIN — ENOXAPARIN SODIUM 40 MG: 40 INJECTION SUBCUTANEOUS at 09:24

## 2019-01-01 RX ADMIN — POTASSIUM & SODIUM PHOSPHATES POWDER PACK 280-160-250 MG 250 MG: 280-160-250 PACK at 20:39

## 2019-01-01 RX ADMIN — ONDANSETRON 4 MG: 2 INJECTION INTRAMUSCULAR; INTRAVENOUS at 00:59

## 2019-01-01 RX ADMIN — OXYCODONE AND ACETAMINOPHEN 1 TABLET: 10; 325 TABLET ORAL at 00:52

## 2019-01-01 RX ADMIN — GLYCOPYRROLATE AND FORMOTEROL FUMARATE 2 PUFF: 9; 4.8 AEROSOL, METERED RESPIRATORY (INHALATION) at 07:46

## 2019-01-01 RX ADMIN — POTASSIUM & SODIUM PHOSPHATES POWDER PACK 280-160-250 MG 250 MG: 280-160-250 PACK at 10:57

## 2019-01-01 RX ADMIN — METOPROLOL TARTRATE 25 MG: 25 TABLET, FILM COATED ORAL at 08:48

## 2019-01-01 RX ADMIN — CEFEPIME HYDROCHLORIDE 2 G: 2 INJECTION, POWDER, FOR SOLUTION INTRAVENOUS at 17:14

## 2019-01-01 RX ADMIN — MORPHINE SULFATE 30 MG: 30 TABLET, FILM COATED, EXTENDED RELEASE ORAL at 08:45

## 2019-01-01 RX ADMIN — MORPHINE SULFATE 30 MG: 30 TABLET, FILM COATED, EXTENDED RELEASE ORAL at 10:03

## 2019-01-01 RX ADMIN — POTASSIUM CHLORIDE 20 MEQ: 750 TABLET, FILM COATED, EXTENDED RELEASE ORAL at 09:24

## 2019-01-01 RX ADMIN — METOPROLOL TARTRATE 25 MG: 25 TABLET, FILM COATED ORAL at 10:33

## 2019-01-01 RX ADMIN — POTASSIUM & SODIUM PHOSPHATES POWDER PACK 280-160-250 MG 250 MG: 280-160-250 PACK at 22:04

## 2019-01-01 RX ADMIN — SODIUM CHLORIDE: 9 INJECTION, SOLUTION INTRAVENOUS at 08:44

## 2019-01-01 RX ADMIN — HYDROMORPHONE HYDROCHLORIDE 0.5 MG: 1 INJECTION, SOLUTION INTRAMUSCULAR; INTRAVENOUS; SUBCUTANEOUS at 09:33

## 2019-01-01 RX ADMIN — ASPIRIN 81 MG: 81 TABLET, COATED ORAL at 08:34

## 2019-01-01 RX ADMIN — Medication 1 CAPSULE: at 09:02

## 2019-01-01 RX ADMIN — PANTOPRAZOLE SODIUM 40 MG: 40 TABLET, DELAYED RELEASE ORAL at 06:35

## 2019-01-01 RX ADMIN — LORAZEPAM 0.5 MG: 2 INJECTION INTRAMUSCULAR; INTRAVENOUS at 10:56

## 2019-01-01 RX ADMIN — HYDROMORPHONE HYDROCHLORIDE 0.5 MG: 1 INJECTION, SOLUTION INTRAMUSCULAR; INTRAVENOUS; SUBCUTANEOUS at 11:56

## 2019-01-01 RX ADMIN — ONDANSETRON 4 MG: 2 INJECTION INTRAMUSCULAR; INTRAVENOUS at 13:44

## 2019-01-01 RX ADMIN — ATORVASTATIN CALCIUM 20 MG: 20 TABLET, FILM COATED ORAL at 22:12

## 2019-01-01 RX ADMIN — MORPHINE SULFATE 30 MG: 30 TABLET, FILM COATED, EXTENDED RELEASE ORAL at 16:44

## 2019-01-01 RX ADMIN — Medication 10 ML: at 22:24

## 2019-01-01 RX ADMIN — PANTOPRAZOLE SODIUM 40 MG: 40 TABLET, DELAYED RELEASE ORAL at 09:10

## 2019-01-01 RX ADMIN — Medication 1 CAPSULE: at 11:46

## 2019-01-01 RX ADMIN — OXYCODONE AND ACETAMINOPHEN 1 TABLET: 10; 325 TABLET ORAL at 15:39

## 2019-01-01 RX ADMIN — DRONABINOL 5 MG: 2.5 CAPSULE ORAL at 09:42

## 2019-01-01 RX ADMIN — PANTOPRAZOLE SODIUM 40 MG: 40 TABLET, DELAYED RELEASE ORAL at 10:41

## 2019-01-01 RX ADMIN — MORPHINE SULFATE 30 MG: 15 TABLET, FILM COATED, EXTENDED RELEASE ORAL at 05:00

## 2019-01-01 RX ADMIN — ATORVASTATIN CALCIUM 20 MG: 20 TABLET, FILM COATED ORAL at 21:45

## 2019-01-01 RX ADMIN — POTASSIUM CHLORIDE 20 MEQ: 750 TABLET, FILM COATED, EXTENDED RELEASE ORAL at 11:19

## 2019-01-01 RX ADMIN — ONDANSETRON 8 MG: 2 INJECTION INTRAMUSCULAR; INTRAVENOUS at 23:11

## 2019-01-01 RX ADMIN — VENLAFAXINE HYDROCHLORIDE 150 MG: 150 CAPSULE, EXTENDED RELEASE ORAL at 10:23

## 2019-01-01 RX ADMIN — IOPAMIDOL 75 ML: 755 INJECTION, SOLUTION INTRAVENOUS at 16:04

## 2019-01-01 RX ADMIN — TBO-FILGRASTIM 300 MCG: 300 INJECTION, SOLUTION SUBCUTANEOUS at 17:19

## 2019-01-01 RX ADMIN — METOPROLOL TARTRATE 25 MG: 25 TABLET, FILM COATED ORAL at 22:06

## 2019-01-01 RX ADMIN — ASPIRIN 81 MG: 81 TABLET, COATED ORAL at 08:48

## 2019-01-01 RX ADMIN — Medication 1 PACKET: at 18:17

## 2019-01-01 RX ADMIN — MICONAZOLE NITRATE: 20 POWDER TOPICAL at 21:56

## 2019-01-01 RX ADMIN — METOPROLOL TARTRATE 25 MG: 25 TABLET, FILM COATED ORAL at 20:12

## 2019-01-01 RX ADMIN — SODIUM CHLORIDE 1000 ML: 9 INJECTION, SOLUTION INTRAVENOUS at 19:30

## 2019-01-01 RX ADMIN — ACETAMINOPHEN 650 MG: 325 TABLET, FILM COATED ORAL at 13:49

## 2019-01-01 RX ADMIN — VENLAFAXINE HYDROCHLORIDE 150 MG: 150 CAPSULE, EXTENDED RELEASE ORAL at 09:11

## 2019-01-01 RX ADMIN — POTASSIUM CHLORIDE 10 MEQ: 7.46 INJECTION, SOLUTION INTRAVENOUS at 10:21

## 2019-01-01 RX ADMIN — SODIUM CHLORIDE: 9 INJECTION, SOLUTION INTRAVENOUS at 01:16

## 2019-01-01 RX ADMIN — METOPROLOL TARTRATE 25 MG: 25 TABLET, FILM COATED ORAL at 20:28

## 2019-01-01 RX ADMIN — OXYCODONE AND ACETAMINOPHEN 1 TABLET: 10; 325 TABLET ORAL at 21:40

## 2019-01-01 RX ADMIN — ATORVASTATIN CALCIUM 10 MG: 10 TABLET, FILM COATED ORAL at 23:36

## 2019-01-01 RX ADMIN — SODIUM CHLORIDE, POTASSIUM CHLORIDE, SODIUM LACTATE AND CALCIUM CHLORIDE: 600; 310; 30; 20 INJECTION, SOLUTION INTRAVENOUS at 10:48

## 2019-01-01 RX ADMIN — ALPRAZOLAM 0.5 MG: 0.5 TABLET ORAL at 03:14

## 2019-01-01 RX ADMIN — MORPHINE SULFATE 30 MG: 30 TABLET, FILM COATED, EXTENDED RELEASE ORAL at 08:17

## 2019-01-01 RX ADMIN — SODIUM CHLORIDE: 9 INJECTION, SOLUTION INTRAVENOUS at 06:04

## 2019-01-01 RX ADMIN — MICONAZOLE NITRATE: 20 POWDER TOPICAL at 21:48

## 2019-01-01 RX ADMIN — METOPROLOL TARTRATE 25 MG: 25 TABLET, FILM COATED ORAL at 09:03

## 2019-01-01 RX ADMIN — POTASSIUM & SODIUM PHOSPHATES POWDER PACK 280-160-250 MG 250 MG: 280-160-250 PACK at 13:09

## 2019-01-01 RX ADMIN — GLYCOPYRROLATE AND FORMOTEROL FUMARATE 2 PUFF: 9; 4.8 AEROSOL, METERED RESPIRATORY (INHALATION) at 07:45

## 2019-01-01 RX ADMIN — HYDROCORTISONE: 1 CREAM TOPICAL at 20:08

## 2019-01-01 RX ADMIN — ONDANSETRON 8 MG: 4 TABLET, ORALLY DISINTEGRATING ORAL at 06:21

## 2019-01-01 RX ADMIN — GLYCOPYRROLATE AND FORMOTEROL FUMARATE 2 PUFF: 9; 4.8 AEROSOL, METERED RESPIRATORY (INHALATION) at 12:55

## 2019-01-01 RX ADMIN — DRONABINOL 5 MG: 2.5 CAPSULE ORAL at 05:56

## 2019-01-01 RX ADMIN — GLYCOPYRROLATE AND FORMOTEROL FUMARATE 2 PUFF: 9; 4.8 AEROSOL, METERED RESPIRATORY (INHALATION) at 19:52

## 2019-01-01 RX ADMIN — VANCOMYCIN HYDROCHLORIDE 1000 MG: 1 INJECTION, SOLUTION INTRAVENOUS at 22:01

## 2019-01-01 RX ADMIN — MORPHINE SULFATE 30 MG: 30 TABLET, FILM COATED, EXTENDED RELEASE ORAL at 08:11

## 2019-01-01 RX ADMIN — Medication 1 CAPSULE: at 09:22

## 2019-01-01 RX ADMIN — POTASSIUM PHOSPHATE, MONOBASIC AND POTASSIUM PHOSPHATE, DIBASIC 20 MMOL: 224; 236 INJECTION, SOLUTION, CONCENTRATE INTRAVENOUS at 08:48

## 2019-01-01 RX ADMIN — MORPHINE SULFATE 30 MG: 15 TABLET, FILM COATED, EXTENDED RELEASE ORAL at 18:17

## 2019-01-01 RX ADMIN — MORPHINE SULFATE 30 MG: 30 TABLET, FILM COATED, EXTENDED RELEASE ORAL at 05:17

## 2019-01-01 RX ADMIN — LIDOCAINE HYDROCHLORIDE 100 MG: 20 INJECTION, SOLUTION EPIDURAL; INFILTRATION; INTRACAUDAL; PERINEURAL at 10:58

## 2019-01-01 RX ADMIN — POTASSIUM CHLORIDE 40 MEQ: 1500 TABLET, EXTENDED RELEASE ORAL at 16:40

## 2019-01-01 RX ADMIN — DRONABINOL 5 MG: 2.5 CAPSULE ORAL at 18:16

## 2019-01-01 RX ADMIN — METOPROLOL TARTRATE 25 MG: 25 TABLET, FILM COATED ORAL at 20:11

## 2019-01-01 RX ADMIN — MORPHINE SULFATE 30 MG: 30 TABLET, FILM COATED, EXTENDED RELEASE ORAL at 11:09

## 2019-01-01 RX ADMIN — MORPHINE SULFATE 30 MG: 30 TABLET, FILM COATED, EXTENDED RELEASE ORAL at 07:43

## 2019-01-01 RX ADMIN — Medication 10 ML: at 09:58

## 2019-01-01 RX ADMIN — OXYCODONE AND ACETAMINOPHEN 1 TABLET: 10; 325 TABLET ORAL at 20:13

## 2019-01-01 RX ADMIN — METOPROLOL TARTRATE 25 MG: 25 TABLET, FILM COATED ORAL at 22:02

## 2019-01-01 RX ADMIN — METOPROLOL TARTRATE 25 MG: 25 TABLET, FILM COATED ORAL at 09:11

## 2019-01-01 RX ADMIN — MICONAZOLE NITRATE: 20 POWDER TOPICAL at 21:02

## 2019-01-01 RX ADMIN — CEFEPIME HYDROCHLORIDE 2 G: 2 INJECTION, POWDER, FOR SOLUTION INTRAVENOUS at 01:53

## 2019-01-01 RX ADMIN — METOPROLOL TARTRATE 25 MG: 25 TABLET, FILM COATED ORAL at 22:12

## 2019-01-01 RX ADMIN — VENLAFAXINE HYDROCHLORIDE 150 MG: 150 CAPSULE, EXTENDED RELEASE ORAL at 10:22

## 2019-01-01 RX ADMIN — SODIUM CHLORIDE: 9 INJECTION, SOLUTION INTRAVENOUS at 09:13

## 2019-01-01 RX ADMIN — LORAZEPAM 0.5 MG: 0.5 TABLET ORAL at 23:10

## 2019-01-01 RX ADMIN — OXYCODONE AND ACETAMINOPHEN 1 TABLET: 10; 325 TABLET ORAL at 14:45

## 2019-01-01 RX ADMIN — MICONAZOLE NITRATE: 20 POWDER TOPICAL at 00:53

## 2019-01-01 RX ADMIN — ACETAMINOPHEN 650 MG: 325 TABLET, FILM COATED ORAL at 04:44

## 2019-01-01 RX ADMIN — OXYCODONE AND ACETAMINOPHEN 1 TABLET: 10; 325 TABLET ORAL at 17:18

## 2019-01-01 RX ADMIN — MORPHINE SULFATE 30 MG: 30 TABLET, FILM COATED, EXTENDED RELEASE ORAL at 21:59

## 2019-01-01 RX ADMIN — Medication 10 ML: at 23:40

## 2019-01-01 RX ADMIN — ASPIRIN 81 MG: 81 TABLET, COATED ORAL at 08:12

## 2019-01-01 RX ADMIN — METOPROLOL TARTRATE 25 MG: 25 TABLET, FILM COATED ORAL at 11:10

## 2019-01-01 RX ADMIN — ENOXAPARIN SODIUM 40 MG: 40 INJECTION SUBCUTANEOUS at 09:22

## 2019-01-01 RX ADMIN — POTASSIUM & SODIUM PHOSPHATES POWDER PACK 280-160-250 MG 250 MG: 280-160-250 PACK at 17:34

## 2019-01-01 RX ADMIN — GLYCOPYRROLATE AND FORMOTEROL FUMARATE 2 PUFF: 9; 4.8 AEROSOL, METERED RESPIRATORY (INHALATION) at 08:56

## 2019-01-01 RX ADMIN — DIPHENHYDRAMINE HCL 25 MG: 25 TABLET ORAL at 10:36

## 2019-01-01 RX ADMIN — GLYCOPYRROLATE AND FORMOTEROL FUMARATE 2 PUFF: 9; 4.8 AEROSOL, METERED RESPIRATORY (INHALATION) at 19:47

## 2019-01-01 ASSESSMENT — PAIN DESCRIPTION - LOCATION
LOCATION: GENERALIZED
LOCATION: GENERALIZED
LOCATION: HEAD
LOCATION: ANKLE;HIP
LOCATION: BREAST
LOCATION: ABDOMEN
LOCATION: ANKLE;HIP
LOCATION: HEAD
LOCATION: GENERALIZED;HIP
LOCATION: GENERALIZED
LOCATION: BACK
LOCATION: OTHER (COMMENT)
LOCATION: BACK;COCCYX
LOCATION: HEAD
LOCATION: BACK;HIP
LOCATION: GENERALIZED
LOCATION: HEAD
LOCATION: ABDOMEN
LOCATION: BUTTOCKS
LOCATION: COCCYX
LOCATION: HEAD
LOCATION: BACK;COCCYX
LOCATION: BUTTOCKS
LOCATION: BACK
LOCATION: HEAD
LOCATION: COCCYX
LOCATION: ABDOMEN
LOCATION: ABDOMEN
LOCATION: BACK;COCCYX
LOCATION: COCCYX
LOCATION: HIP
LOCATION: BACK;HIP
LOCATION: COCCYX
LOCATION: ABDOMEN
LOCATION: BACK
LOCATION: OTHER (COMMENT)
LOCATION: ABDOMEN
LOCATION: BACK;HIP
LOCATION: HIP
LOCATION: COCCYX
LOCATION: BACK;HIP
LOCATION: BACK;COCCYX
LOCATION: PENIS
LOCATION: BUTTOCKS
LOCATION: HIP
LOCATION: BACK
LOCATION: GENERALIZED

## 2019-01-01 ASSESSMENT — ENCOUNTER SYMPTOMS
COLOR CHANGE: 0
STRIDOR: 0
ABDOMINAL PAIN: 0
RHINORRHEA: 0
SORE THROAT: 0
BACK PAIN: 0
ABDOMINAL PAIN: 1
COUGH: 0
SHORTNESS OF BREATH: 0
SORE THROAT: 0
EYE DISCHARGE: 0
WHEEZING: 0
NAUSEA: 1
CHEST TIGHTNESS: 1
DIARRHEA: 0
COUGH: 0
RHINORRHEA: 0
ABDOMINAL DISTENTION: 0
VOMITING: 0
COUGH: 1
VOMITING: 0
WHEEZING: 0
SHORTNESS OF BREATH: 0
RHINORRHEA: 0
VOMITING: 0
DIARRHEA: 0
DIARRHEA: 0
ABDOMINAL PAIN: 0
CONSTIPATION: 0
SHORTNESS OF BREATH: 0
NAUSEA: 0
BLOOD IN STOOL: 0
COUGH: 0
CONSTIPATION: 0
CONSTIPATION: 0
STRIDOR: 0
SHORTNESS OF BREATH: 0
CONSTIPATION: 0
RESPIRATORY NEGATIVE: 1
APNEA: 0
ABDOMINAL PAIN: 0
NAUSEA: 0
CONSTIPATION: 0
BLOOD IN STOOL: 0
TROUBLE SWALLOWING: 0
SHORTNESS OF BREATH: 0
RHINORRHEA: 0
WHEEZING: 1
SHORTNESS OF BREATH: 0
BACK PAIN: 0
ABDOMINAL PAIN: 0
COUGH: 1
CONSTIPATION: 0
SHORTNESS OF BREATH: 0
NAUSEA: 1
SORE THROAT: 0
ABDOMINAL DISTENTION: 0
BLOOD IN STOOL: 0
ABDOMINAL PAIN: 0
SHORTNESS OF BREATH: 1
COLOR CHANGE: 0
DIARRHEA: 1
EYE DISCHARGE: 0
NAUSEA: 0
SHORTNESS OF BREATH: 0
VOMITING: 1
ABDOMINAL PAIN: 0
DIARRHEA: 0
BACK PAIN: 0
CONSTIPATION: 0
COUGH: 0
BACK PAIN: 0
EYE REDNESS: 0
COUGH: 0
RHINORRHEA: 0
VOICE CHANGE: 0
TROUBLE SWALLOWING: 0
SORE THROAT: 0
EYE REDNESS: 0
VOMITING: 0
NAUSEA: 0
WHEEZING: 0
WHEEZING: 1
DIARRHEA: 1
ABDOMINAL PAIN: 1
CHOKING: 0
RHINORRHEA: 0
SHORTNESS OF BREATH: 1
VOMITING: 0
TROUBLE SWALLOWING: 0
DIARRHEA: 0
EYE REDNESS: 0
NAUSEA: 0
ABDOMINAL PAIN: 0
TROUBLE SWALLOWING: 0
SORE THROAT: 0
RHINORRHEA: 0
DIARRHEA: 0
SORE THROAT: 0
CONSTIPATION: 0
PHOTOPHOBIA: 0
NAUSEA: 0
NAUSEA: 0
COUGH: 0
ANAL BLEEDING: 0
ABDOMINAL PAIN: 0
RHINORRHEA: 0
BACK PAIN: 0
EYE DISCHARGE: 0
SINUS PRESSURE: 0

## 2019-01-01 ASSESSMENT — PAIN DESCRIPTION - PAIN TYPE
TYPE: CHRONIC PAIN
TYPE: ACUTE PAIN
TYPE: CHRONIC PAIN
TYPE: ACUTE PAIN
TYPE: SURGICAL PAIN
TYPE: ACUTE PAIN
TYPE: CHRONIC PAIN
TYPE: ACUTE PAIN
TYPE: CHRONIC PAIN
TYPE: CHRONIC PAIN
TYPE: ACUTE PAIN
TYPE: CHRONIC PAIN
TYPE: CHRONIC PAIN
TYPE: ACUTE PAIN
TYPE: CHRONIC PAIN
TYPE: ACUTE PAIN
TYPE: CHRONIC PAIN
TYPE: ACUTE PAIN
TYPE: CHRONIC PAIN
TYPE: CHRONIC PAIN
TYPE: ACUTE PAIN
TYPE: CHRONIC PAIN
TYPE: ACUTE PAIN
TYPE: CHRONIC PAIN
TYPE: ACUTE PAIN
TYPE: CHRONIC PAIN
TYPE: ACUTE PAIN
TYPE: CHRONIC PAIN
TYPE: ACUTE PAIN
TYPE: CHRONIC PAIN

## 2019-01-01 ASSESSMENT — PAIN DESCRIPTION - FREQUENCY
FREQUENCY: CONTINUOUS
FREQUENCY: INTERMITTENT
FREQUENCY: CONTINUOUS
FREQUENCY: INTERMITTENT
FREQUENCY: CONTINUOUS
FREQUENCY: CONTINUOUS
FREQUENCY: INTERMITTENT
FREQUENCY: CONTINUOUS
FREQUENCY: INTERMITTENT
FREQUENCY: CONTINUOUS
FREQUENCY: INTERMITTENT
FREQUENCY: CONTINUOUS

## 2019-01-01 ASSESSMENT — PAIN SCALES - GENERAL
PAINLEVEL_OUTOF10: 0
PAINLEVEL_OUTOF10: 0
PAINLEVEL_OUTOF10: 8
PAINLEVEL_OUTOF10: 0
PAINLEVEL_OUTOF10: 0
PAINLEVEL_OUTOF10: 4
PAINLEVEL_OUTOF10: 0
PAINLEVEL_OUTOF10: 7
PAINLEVEL_OUTOF10: 0
PAINLEVEL_OUTOF10: 0
PAINLEVEL_OUTOF10: 4
PAINLEVEL_OUTOF10: 3
PAINLEVEL_OUTOF10: 8
PAINLEVEL_OUTOF10: 6
PAINLEVEL_OUTOF10: 2
PAINLEVEL_OUTOF10: 10
PAINLEVEL_OUTOF10: 10
PAINLEVEL_OUTOF10: 8
PAINLEVEL_OUTOF10: 7
PAINLEVEL_OUTOF10: 0
PAINLEVEL_OUTOF10: 3
PAINLEVEL_OUTOF10: 7
PAINLEVEL_OUTOF10: 0
PAINLEVEL_OUTOF10: 0
PAINLEVEL_OUTOF10: 7
PAINLEVEL_OUTOF10: 5
PAINLEVEL_OUTOF10: 7
PAINLEVEL_OUTOF10: 4
PAINLEVEL_OUTOF10: 7
PAINLEVEL_OUTOF10: 6
PAINLEVEL_OUTOF10: 5
PAINLEVEL_OUTOF10: 0
PAINLEVEL_OUTOF10: 10
PAINLEVEL_OUTOF10: 9
PAINLEVEL_OUTOF10: 10
PAINLEVEL_OUTOF10: 0
PAINLEVEL_OUTOF10: 6
PAINLEVEL_OUTOF10: 0
PAINLEVEL_OUTOF10: 7
PAINLEVEL_OUTOF10: 6
PAINLEVEL_OUTOF10: 0
PAINLEVEL_OUTOF10: 6
PAINLEVEL_OUTOF10: 1
PAINLEVEL_OUTOF10: 0
PAINLEVEL_OUTOF10: 0
PAINLEVEL_OUTOF10: 5
PAINLEVEL_OUTOF10: 0
PAINLEVEL_OUTOF10: 8
PAINLEVEL_OUTOF10: 0
PAINLEVEL_OUTOF10: 1
PAINLEVEL_OUTOF10: 0
PAINLEVEL_OUTOF10: 8
PAINLEVEL_OUTOF10: 7
PAINLEVEL_OUTOF10: 3
PAINLEVEL_OUTOF10: 0
PAINLEVEL_OUTOF10: 7
PAINLEVEL_OUTOF10: 3
PAINLEVEL_OUTOF10: 7
PAINLEVEL_OUTOF10: 4
PAINLEVEL_OUTOF10: 0
PAINLEVEL_OUTOF10: 4
PAINLEVEL_OUTOF10: 0
PAINLEVEL_OUTOF10: 6
PAINLEVEL_OUTOF10: 0
PAINLEVEL_OUTOF10: 2
PAINLEVEL_OUTOF10: 6
PAINLEVEL_OUTOF10: 2
PAINLEVEL_OUTOF10: 0
PAINLEVEL_OUTOF10: 3
PAINLEVEL_OUTOF10: 10
PAINLEVEL_OUTOF10: 8
PAINLEVEL_OUTOF10: 4
PAINLEVEL_OUTOF10: 0
PAINLEVEL_OUTOF10: 5
PAINLEVEL_OUTOF10: 2
PAINLEVEL_OUTOF10: 0
PAINLEVEL_OUTOF10: 3
PAINLEVEL_OUTOF10: 3
PAINLEVEL_OUTOF10: 0
PAINLEVEL_OUTOF10: 7
PAINLEVEL_OUTOF10: 8
PAINLEVEL_OUTOF10: 0
PAINLEVEL_OUTOF10: 3
PAINLEVEL_OUTOF10: 4
PAINLEVEL_OUTOF10: 6
PAINLEVEL_OUTOF10: 7
PAINLEVEL_OUTOF10: 2
PAINLEVEL_OUTOF10: 4
PAINLEVEL_OUTOF10: 0
PAINLEVEL_OUTOF10: 5
PAINLEVEL_OUTOF10: 0
PAINLEVEL_OUTOF10: 0
PAINLEVEL_OUTOF10: 5
PAINLEVEL_OUTOF10: 3
PAINLEVEL_OUTOF10: 0
PAINLEVEL_OUTOF10: 8
PAINLEVEL_OUTOF10: 6
PAINLEVEL_OUTOF10: 0
PAINLEVEL_OUTOF10: 0
PAINLEVEL_OUTOF10: 4
PAINLEVEL_OUTOF10: 4
PAINLEVEL_OUTOF10: 0
PAINLEVEL_OUTOF10: 8
PAINLEVEL_OUTOF10: 5
PAINLEVEL_OUTOF10: 7
PAINLEVEL_OUTOF10: 8
PAINLEVEL_OUTOF10: 0
PAINLEVEL_OUTOF10: 5
PAINLEVEL_OUTOF10: 3
PAINLEVEL_OUTOF10: 7
PAINLEVEL_OUTOF10: 5
PAINLEVEL_OUTOF10: 0
PAINLEVEL_OUTOF10: 4
PAINLEVEL_OUTOF10: 7
PAINLEVEL_OUTOF10: 6
PAINLEVEL_OUTOF10: 4
PAINLEVEL_OUTOF10: 8
PAINLEVEL_OUTOF10: 7
PAINLEVEL_OUTOF10: 6
PAINLEVEL_OUTOF10: 0
PAINLEVEL_OUTOF10: 0
PAINLEVEL_OUTOF10: 2
PAINLEVEL_OUTOF10: 0
PAINLEVEL_OUTOF10: 4
PAINLEVEL_OUTOF10: 7
PAINLEVEL_OUTOF10: 6
PAINLEVEL_OUTOF10: 2
PAINLEVEL_OUTOF10: 7
PAINLEVEL_OUTOF10: 8
PAINLEVEL_OUTOF10: 0
PAINLEVEL_OUTOF10: 0
PAINLEVEL_OUTOF10: 7
PAINLEVEL_OUTOF10: 8
PAINLEVEL_OUTOF10: 0
PAINLEVEL_OUTOF10: 0
PAINLEVEL_OUTOF10: 7
PAINLEVEL_OUTOF10: 0
PAINLEVEL_OUTOF10: 9
PAINLEVEL_OUTOF10: 10
PAINLEVEL_OUTOF10: 5
PAINLEVEL_OUTOF10: 7
PAINLEVEL_OUTOF10: 7
PAINLEVEL_OUTOF10: 3
PAINLEVEL_OUTOF10: 0
PAINLEVEL_OUTOF10: 3
PAINLEVEL_OUTOF10: 2
PAINLEVEL_OUTOF10: 8
PAINLEVEL_OUTOF10: 0
PAINLEVEL_OUTOF10: 4
PAINLEVEL_OUTOF10: 0
PAINLEVEL_OUTOF10: 0
PAINLEVEL_OUTOF10: 6
PAINLEVEL_OUTOF10: 0
PAINLEVEL_OUTOF10: 7
PAINLEVEL_OUTOF10: 0
PAINLEVEL_OUTOF10: 10
PAINLEVEL_OUTOF10: 0
PAINLEVEL_OUTOF10: 6
PAINLEVEL_OUTOF10: 6
PAINLEVEL_OUTOF10: 8
PAINLEVEL_OUTOF10: 8
PAINLEVEL_OUTOF10: 7
PAINLEVEL_OUTOF10: 7
PAINLEVEL_OUTOF10: 2
PAINLEVEL_OUTOF10: 0
PAINLEVEL_OUTOF10: 6
PAINLEVEL_OUTOF10: 8
PAINLEVEL_OUTOF10: 0
PAINLEVEL_OUTOF10: 4
PAINLEVEL_OUTOF10: 0
PAINLEVEL_OUTOF10: 7
PAINLEVEL_OUTOF10: 5
PAINLEVEL_OUTOF10: 0
PAINLEVEL_OUTOF10: 0
PAINLEVEL_OUTOF10: 7
PAINLEVEL_OUTOF10: 7
PAINLEVEL_OUTOF10: 8
PAINLEVEL_OUTOF10: 7
PAINLEVEL_OUTOF10: 7
PAINLEVEL_OUTOF10: 9
PAINLEVEL_OUTOF10: 7
PAINLEVEL_OUTOF10: 5
PAINLEVEL_OUTOF10: 6
PAINLEVEL_OUTOF10: 0
PAINLEVEL_OUTOF10: 8
PAINLEVEL_OUTOF10: 7
PAINLEVEL_OUTOF10: 5
PAINLEVEL_OUTOF10: 0
PAINLEVEL_OUTOF10: 7
PAINLEVEL_OUTOF10: 7
PAINLEVEL_OUTOF10: 0
PAINLEVEL_OUTOF10: 4
PAINLEVEL_OUTOF10: 5
PAINLEVEL_OUTOF10: 6
PAINLEVEL_OUTOF10: 6
PAINLEVEL_OUTOF10: 5
PAINLEVEL_OUTOF10: 6
PAINLEVEL_OUTOF10: 8
PAINLEVEL_OUTOF10: 2
PAINLEVEL_OUTOF10: 0
PAINLEVEL_OUTOF10: 9
PAINLEVEL_OUTOF10: 0
PAINLEVEL_OUTOF10: 5
PAINLEVEL_OUTOF10: 5
PAINLEVEL_OUTOF10: 3
PAINLEVEL_OUTOF10: 5
PAINLEVEL_OUTOF10: 0
PAINLEVEL_OUTOF10: 7
PAINLEVEL_OUTOF10: 10
PAINLEVEL_OUTOF10: 6
PAINLEVEL_OUTOF10: 10
PAINLEVEL_OUTOF10: 3
PAINLEVEL_OUTOF10: 0
PAINLEVEL_OUTOF10: 3
PAINLEVEL_OUTOF10: 5
PAINLEVEL_OUTOF10: 6
PAINLEVEL_OUTOF10: 0
PAINLEVEL_OUTOF10: 7
PAINLEVEL_OUTOF10: 10
PAINLEVEL_OUTOF10: 7
PAINLEVEL_OUTOF10: 10
PAINLEVEL_OUTOF10: 0
PAINLEVEL_OUTOF10: 7
PAINLEVEL_OUTOF10: 7
PAINLEVEL_OUTOF10: 0
PAINLEVEL_OUTOF10: 0
PAINLEVEL_OUTOF10: 6
PAINLEVEL_OUTOF10: 3
PAINLEVEL_OUTOF10: 0

## 2019-01-01 ASSESSMENT — PULMONARY FUNCTION TESTS
PIF_VALUE: 21
PIF_VALUE: 0
PIF_VALUE: 0
PIF_VALUE: 16
PIF_VALUE: 0
PIF_VALUE: 16
PIF_VALUE: 16
PIF_VALUE: 15
PIF_VALUE: 0
PIF_VALUE: 2
PIF_VALUE: 26
PIF_VALUE: 20
PIF_VALUE: 0
PIF_VALUE: 16
PIF_VALUE: 0
PIF_VALUE: 13
PIF_VALUE: 0
PIF_VALUE: 0
PIF_VALUE: 20
PIF_VALUE: 0
PIF_VALUE: 21
PIF_VALUE: 0
PIF_VALUE: 6
PIF_VALUE: 16
PIF_VALUE: 22
PIF_VALUE: 3
PIF_VALUE: 16
PIF_VALUE: 0
PIF_VALUE: 1
PIF_VALUE: 20
PIF_VALUE: 16
PIF_VALUE: 0
PIF_VALUE: 3
PIF_VALUE: 3
PIF_VALUE: 0
PIF_VALUE: 17
PIF_VALUE: 6
PIF_VALUE: 0
PIF_VALUE: 0
PIF_VALUE: 15
PIF_VALUE: 15
PIF_VALUE: 13
PIF_VALUE: 17
PIF_VALUE: 0
PIF_VALUE: 0
PIF_VALUE: 2
PEFR_L/MIN: 20
PIF_VALUE: 3
PIF_VALUE: 15
PIF_VALUE: 3
PIF_VALUE: 16
PIF_VALUE: 19
PIF_VALUE: 16
PIF_VALUE: 0
PIF_VALUE: 2
PIF_VALUE: 1
PIF_VALUE: 2
PIF_VALUE: 16
PIF_VALUE: 1
PIF_VALUE: 0
PIF_VALUE: 3
PIF_VALUE: 20
PIF_VALUE: 17
PIF_VALUE: 21
PIF_VALUE: 16
PIF_VALUE: 0
PIF_VALUE: 4
PIF_VALUE: 16
PIF_VALUE: 0

## 2019-01-01 ASSESSMENT — PAIN DESCRIPTION - DESCRIPTORS
DESCRIPTORS: CONSTANT
DESCRIPTORS: CONSTANT
DESCRIPTORS: ACHING
DESCRIPTORS: CONSTANT
DESCRIPTORS: SHARP
DESCRIPTORS: HEADACHE
DESCRIPTORS: CONSTANT
DESCRIPTORS: ACHING
DESCRIPTORS: DISCOMFORT
DESCRIPTORS: DISCOMFORT
DESCRIPTORS: SORE
DESCRIPTORS: ACHING;CONSTANT
DESCRIPTORS: DISCOMFORT
DESCRIPTORS: HEADACHE
DESCRIPTORS: CONSTANT
DESCRIPTORS: CONSTANT
DESCRIPTORS: PATIENT UNABLE TO DESCRIBE
DESCRIPTORS: ACHING
DESCRIPTORS: CRAMPING
DESCRIPTORS: DISCOMFORT
DESCRIPTORS: HEADACHE
DESCRIPTORS: ACHING
DESCRIPTORS: DISCOMFORT
DESCRIPTORS: SHARP
DESCRIPTORS: CONSTANT

## 2019-01-01 ASSESSMENT — PAIN SCALES - WONG BAKER

## 2019-01-01 ASSESSMENT — PAIN DESCRIPTION - ORIENTATION
ORIENTATION: MID
ORIENTATION: LOWER
ORIENTATION: LEFT
ORIENTATION: LEFT
ORIENTATION: LOWER;MID
ORIENTATION: LOWER
ORIENTATION: MID;LOWER
ORIENTATION: LOWER;MID
ORIENTATION: LEFT
ORIENTATION: RIGHT
ORIENTATION: RIGHT;LEFT
ORIENTATION: LEFT
ORIENTATION: MID;LOWER
ORIENTATION: MID
ORIENTATION: LEFT
ORIENTATION: LEFT
ORIENTATION: LOWER
ORIENTATION: LOWER
ORIENTATION: RIGHT;LEFT
ORIENTATION: MID
ORIENTATION: RIGHT;LEFT

## 2019-01-01 ASSESSMENT — PAIN DESCRIPTION - ONSET
ONSET: ON-GOING
ONSET: PROGRESSIVE
ONSET: ON-GOING

## 2019-01-01 ASSESSMENT — PAIN DESCRIPTION - PROGRESSION
CLINICAL_PROGRESSION: GRADUALLY IMPROVING
CLINICAL_PROGRESSION: NOT CHANGED
CLINICAL_PROGRESSION: GRADUALLY IMPROVING
CLINICAL_PROGRESSION: NOT CHANGED
CLINICAL_PROGRESSION: GRADUALLY IMPROVING
CLINICAL_PROGRESSION: GRADUALLY WORSENING
CLINICAL_PROGRESSION: NOT CHANGED
CLINICAL_PROGRESSION: GRADUALLY IMPROVING
CLINICAL_PROGRESSION: NOT CHANGED
CLINICAL_PROGRESSION: GRADUALLY IMPROVING
CLINICAL_PROGRESSION: GRADUALLY IMPROVING
CLINICAL_PROGRESSION: NOT CHANGED
CLINICAL_PROGRESSION: GRADUALLY IMPROVING
CLINICAL_PROGRESSION: NOT CHANGED
CLINICAL_PROGRESSION: GRADUALLY IMPROVING

## 2019-01-01 ASSESSMENT — PAIN - FUNCTIONAL ASSESSMENT
PAIN_FUNCTIONAL_ASSESSMENT: ACTIVITIES ARE NOT PREVENTED
PAIN_FUNCTIONAL_ASSESSMENT: 0-10
PAIN_FUNCTIONAL_ASSESSMENT: ACTIVITIES ARE NOT PREVENTED

## 2019-05-03 NOTE — PROGRESS NOTES
checkbooks, etc.) Do not wear any makeup (including no eye makeup) or nail polish on your fingers or toes. 10. DO NOT wear any jewelry or piercings on day of surgery. All body piercing jewelry must be removed. 11. If you have ___dentures, they will be removed before going to the OR; we will provide you a container. If you wear ___contact lenses or ___glasses, they will be removed; please bring a case for them. 12. Please see your family doctor/pediatrician for a history & physical and/or concerning medications. Bring any test results/reports from your physician's office. PCP__________________Phone___________H&P Appt. Date________             13 If you  have a Living Will and Durable Power of  for Healthcare, please bring in a copy. 15. Notify your Surgeon if you develop any illness between now and surgery  time, cough, cold, fever, sore throat, nausea, vomiting, etc.  Please notify your surgeon if you experience dizziness, shortness of breath or blurred vision between now & the time of your surgery             15. DO NOT shave your operative site 96 hours prior to surgery. For face & neck surgery, men may use an electric razor 48 hours prior to surgery. 16. Shower the night before surgery with _XXX__Antibacterial soap ___Hibiclens             17. To provide excellent care visitors will be limited to one in the room at any given time. 18.  Please bring picture ID and insurance card. 19.  Visit our web site for additional information:  [email or web address]-eprep              20.During flu season no children under the age of 15 are permitted in the hospital for the safety of all patients.                               21. If you take a long acting insulin in the evening only  take half of your usual  dose the night  before your procedure              22. If you use a c-pap please bring DOS if staying overnight, 23.For your convenience [id number] [facility] has a pharmacy on site to fill your prescriptions. 24. If you use oxygen and have a portable tank please bring it  with you the DOS             25. Bring a complete list of all your medications with name and dose include any supplements. 26. Other__________________________________________   *Please call pre admission testing if you any further questions   [facility] [address] [place] [address]. [place]  [phone]   [facility]   [phone]       All above information reviewed with patient's wife by phone. Wife verbalizes understanding. All questions and concerns addressed.                                                                                                  Patient/Rep____________________                                                                                                                                    PRE OP INSTRUCTIONS

## 2019-05-06 NOTE — ANESTHESIA POSTPROCEDURE EVALUATION
Department of Anesthesiology  Postprocedure Note    Patient: Lady Sims  MRN: [id number]  YOB: 1950  Date of evaluation: 5/6/2019  Time:  12:59 PM     Procedure Summary     Date:  05/06/19 Room / Location:  [facility] OR 05 / [facility] OR    Anesthesia Start:  1053 Anesthesia Stop:  1120    Procedure:  CYSTOSCOPY WITH PROSTATE ULTRASOUND FOR VOLUME/SIZE (N/A ) Diagnosis:       (E39.12 POOR URINARY STREAM)      (N48.89  OTHER SPECIFIED DISORDER OF PENIS)    Surgeon:  Sharon Murphy MD Responsible Provider:  Guillaume Fox MD    Anesthesia Type:  MAC ASA Status:  3          Anesthesia Type: MAC    Ayaka Phase I: Ayaka Score: 10    Ayaka Phase II: Ayaka Score: 10    Last vitals: Reviewed and per EMR flowsheets.        Anesthesia Post Evaluation    Level of consciousness: awake  Complications: no

## 2019-05-06 NOTE — PROGRESS NOTES
Discussed d/c instructions with pt and friend/family at bedside. Verbalized understanding. Provided pt/family with written instructions.

## 2019-05-06 NOTE — H&P
The history and physical exam was reviewed. The patient was seen and examined in pre-op. He had a chance to ask questions which were answered. There has been no interval changes. Plan to continue to the OR for cystoscopy and TRUS for volume. Jozef Melgar  5/6/2019         Addendum:  Summary of relevant past medical and surgical history, previous procedures; other previous significant medical history        Past medical History:   He has a past medical history of ADHD (attention deficit hyperactivity disorder), Bipolar 1 disorder ([facility] [address] CAD (coronary artery disease) (2016), Cancer ([address] Cerebrovascular disease (2016), COPD (chronic obstructive pulmonary disease) ([facility] [address] Depression, Hard of hearing, Hyperlipidemia, Hypertension, Lactose intolerance, OCD (obsessive compulsive disorder), PFO (patent foramen ovale) (2016), Prolonged Q-T interval on ECG (06/2017), Prostate cancer ([facility] [address] PUD (peptic ulcer disease) (6/1980), Sleep apnea, Suicide attempt [facility]) (July, 2014), and Wound infection (July 17, 2014). Past Surgical History:  He has a past surgical history that includes lumbar laminectomy (1995); Tonsillectomy; Coronary artery bypass graft (5/23/16); Colonoscopy (06/05/2017); Cardiac surgery; and Femur fracture surgery (Left, 09/21/2017). Allergies:    Allergies   Allergen Reactions    Dicyclomine Other (See Comments)     Muscle spasms    Dye [Iodides] Swelling     ivp dye     Eliquis [Apixaban] Nausea Only    Lactose Intolerance (Gi)     Pcn [Penicillins] Swelling     itch    Plavix  [Clopidogrel Bisulfate]     Plavix [Clopidogrel] Other (See Comments)     Flu like sx

## 2019-05-06 NOTE — ANESTHESIA PRE PROCEDURE
Department of Anesthesiology  Preprocedure Note       Name:  Lali Rendon   Age:  80 y.o.  :  1950                                          MRN:  [id number]         Date:  2019      Surgeon: Loly Burns):  Lupis Flores MD    Procedure: CYSTOSCOPY WITH PROSTATE ULTRASOUND FOR VOLUME/SIZE (N/A )    Medications prior to admission:   Prior to Admission medications    Medication Sig Start Date End Date Taking? Authorizing Provider   oxyCODONE-acetaminophen (PERCOCET)  MG per tablet Take 1 tablet by mouth every 4 hours as needed for Pain. Yes Historical Provider, MD   pantoprazole (PROTONIX) 40 MG tablet Take 40 mg by mouth daily   Yes Historical Provider, MD   metoprolol tartrate (LOPRESSOR) 25 MG tablet TAKE 1 TABLET BY MOUTH TWICE A DAY 3/7/19  Yes VICKI Ham CNP   venlafaxine (EFFEXOR XR) 150 MG extended release capsule TAKE 2 CAPSULES BY MOUTH EVERY DAY 19   VICKI Valencia CNP   ARIPiprazole (ABILIFY) 10 MG tablet TAKE 1 TABLET BY MOUTH EVERY DAY 19   VICKI Muñiz - CNP   albuterol (PROVENTIL) (5 MG/ML) 0.5% nebulizer solution Take 1 mL by nebulization 4 times daily as needed for Wheezing 18   Marcelina Rosado MD   Blood Pressure Monitoring (BLOOD PRESSURE MONITOR/S CUFF) MISC 1 Device by Does not apply route daily 18   VICKI Hodge CNP   prednisoLONE 5 MG TABS Take 5 mg by mouth daily    Historical Provider, MD   hydrocortisone 2.5 % cream Apply topically every other day Apply topically 2 times daily.     Historical Provider, MD   lidocaine (LIDODERM) Place 1 patch onto the skin as needed    Historical Provider, MD   umeclidinium-vilanterol (ANORO ELLIPTA) 62.5-25 MCG/INH AEPB inhaler Inhale 1 puff into the lungs daily 12/10/18   Chidi Garcia MD   atorvastatin (LIPITOR) 20 MG tablet TAKE ONE TABLET BY MOUTH ONCE NIGHTLY 18   Rene Leggett MD   Albuterol Sulfate (VENTOLIN HFA IN) Inhale into the lungs as needed Historical Provider, MD   ondansetron (ZOFRAN ODT) 4 MG disintegrating tablet Take 1-2 tablets by mouth every 8 hours as needed for Nausea May substitute the non ODT tablets if not covered financially by the insurance plan. 9/10/18   Trina Mixon MD   ALPRAZolam [place] Downey) 0.25 MG tablet Take 1 mg by mouth 3 times daily. 7/16/18   Historical Provider, MD   cyclobenzaprine (FLEXERIL) 10 MG tablet TAKE ONE TABLET BY MOUTH THREE TIMES A DAY AS NEEDED FOR MUSCLE SPASMS 7/30/18   Tania Lehman MD   dronabinol (MARINOL) 2.5 MG capsule as needed. . 7/3/17   Historical Provider, MD   morphine (MS CONTIN) 30 MG extended release tablet Take 1 tablet by mouth 2 times daily . Patient taking differently: Take 30 mg by mouth every 8 hours.   8/16/17   VICKI Child CNP   abiraterone acetate (ZYTIGA) 250 MG tablet Take 4 tablets by mouth daily 8/2/17   Radu Mcneil MD   hydrOXYzine (ATARAX) 50 MG tablet Take 1 tablet by mouth every 6 hours as needed for Itching or Anxiety (Has skin picking and anxiety) 7/12/17   VICKI Schmitz CNP   aspirin 81 MG tablet Take 1 tablet by mouth daily 7/7/17   VICKI Schmitz CNP   cholestyramine Bettylou Pretty) 4 G packet Take 1 packet by mouth 2 times daily as needed (diarrhea) 6/9/17   VICKI Child CNP   potassium chloride (KLOR-CON) 10 MEQ extended release tablet Take 2 tablets by mouth daily 6/9/17   VICIK Child CNP   tamsulosin [facility]) 0.4 MG capsule Take 1 capsule by mouth daily 3/21/17   VICKI Child CNP   prochlorperazine (COMPAZINE) 5 MG tablet Take 1 tablet by mouth every 6 hours as needed for Nausea 3/21/17   VICKI Child CNP   traZODone (DESYREL) 100 MG tablet TAKE ONE TABLET BY MOUTH ONCE NIGHTLY  Patient taking differently: nightly as needed TAKE ONE TABLET BY MOUTH ONCE NIGHTLY 12/5/16   VICKI Schmitz CNP       Current medications:    Current Facility-Administered Medications Medication Dose Route Frequency Provider Last Rate Last Dose    lactated ringers infusion   Intravenous Continuous Felipe MD Alex        lidocaine PF 1 % injection 0.5 mL  0.5 mL Intradermal Once Felipe MD Alex        ciprofloxacin (CIPRO) IVPB 400 mg  400 mg Intravenous On Call to [address], MD           Allergies:     Allergies   Allergen Reactions    Dicyclomine Other (See Comments)     Muscle spasms    Dye [Iodides] Swelling     ivp dye     Eliquis [Apixaban] Nausea Only    Lactose Intolerance (Gi)     Pcn [Penicillins] Swelling     itch    Plavix  [Clopidogrel Bisulfate]     Plavix [Clopidogrel] Other (See Comments)     Flu like sx       Problem List:    Patient Active Problem List   Diagnosis Code    Hyperlipidemia E78.5    Severe recurrent major depression without psychotic features ([facility] F33.2    Alcohol dependence ([facility] [facility] F10.20    Hypertension I10    TIA (transient ischemic attack) G45.9    Hemispheric carotid artery syndrome G45.1    Patent foramen ovale Q21.1    Arachnoid cyst G93.0    NSTEMI (non-ST elevated myocardial infarction) ([facility] I21.4    Unstable angina ([facility]) I20.0    Coronary artery disease involving native coronary artery of native heart with angina pectoris ([facility]) I25.119    PFO (patent foramen ovale) Q21.1    S/P CABG x 3 Z95.1    Left hip pain M25.552    Left leg weakness R29.898    Hydronephrosis of right kidney N13.30    Abnormal weight loss R63.4    Lytic bone lesions on xray M89.9    Bone pain M89.8X9    Prostate cancer ([facility]) C61    Neoplasm related pain G89.3    Anorexia R63.0    Drug-induced constipation K59.03    Prostate cancer metastatic to bone ([facility]) C61, C79.51    Left facial numbness R20.0    Bone metastasis ([facility]) C79.51    Weakness R53.1    Bilateral leg edema R60.0    Diarrhea R19.7    Irritable bowel syndrome with diarrhea K58.0    Prolonged Q-T interval on ECG R94.31    Severe major depression ([facility]) F32.2    Subjective tinnitus of both ears H93.13    Hearing loss of both ears H91.93    Anxiety F41.9    Bipolar 1 disorder (HCC) F31.9    9/21/17 LEFT femur IM nail 2/2 metastatic prostate cancer C79.51    Fracture T14. 8XXA    Pulmonary emphysema ([facility] [address] [facility] J43.9    Primary central sleep apnea G47.31       Past Medical History:        Diagnosis Date    ADHD (attention deficit hyperactivity disorder)     Bipolar 1 disorder ([facility])     CAD (coronary artery disease) 2016    Cancer ([facility] [address] [facility]     BONE    Cerebrovascular disease 2016    5 TIAs in 3 weeks - R side weakness, numbness R face    Depression     Hard of hearing     left ear     Hyperlipidemia     Hypertension     Lactose intolerance     OCD (obsessive compulsive disorder)     PFO (patent foramen ovale) 2016    Prolonged Q-T interval on ECG 06/2017    Prostate cancer ([facility]     PUD (peptic ulcer disease) 6/1980    GI bleed (\"lost half blood volume\"). no surgery.  Sleep apnea     severe--uses CPAP    Suicide attempt [facility]) July, 2014    [facility] hospitalization    Wound infection July 17, 2014    L thigh.  [facility] hospitalization       Past Surgical History:        Procedure Laterality Date    CARDIAC SURGERY      COLONOSCOPY  06/05/2017    CORONARY ARTERY BYPASS GRAFT  5/23/16    cabgx3, PFO closure Rere Patel)    FEMUR FRACTURE SURGERY Left 09/21/2017    Prophylactic IM nail left femur for impending fracture    LUMBAR LAMINECTOMY  1995    TONSILLECTOMY         Social History:    Social History     Tobacco Use    Smoking status: Never Smoker    Smokeless tobacco: Current User     Types: Snuff    Tobacco comment: uses very little snuff   Substance Use Topics    Alcohol use: No     Comment: pt states he quit in Feb.                                Ready to quit: Not Answered  Counseling given: Not Answered  Comment: uses very little snuff      Vital Signs (Current):   Vitals:    05/03/19 1549 05/06/19 0957   BP:  (!) 162/86   Pulse:  78   Resp:  16 Temp:  97.1 °F (36.2 °C)   TempSrc:  Temporal   SpO2:  99%   Weight: 145 lb (65.8 kg) 146 lb (66.2 kg)   Height: 5' 9\" (1.753 m) 5' 9\" (1.753 m)                                              BP Readings from Last 3 Encounters:   05/06/19 (!) 162/86   01/25/19 136/82   12/18/18 116/76       NPO Status: Time of last liquid consumption: 2100                        Time of last solid consumption: 2100                        Date of last liquid consumption: 05/05/19                        Date of last solid food consumption: 05/05/19    BMI:   Wt Readings from Last 3 Encounters:   05/06/19 146 lb (66.2 kg)   01/25/19 150 lb (68 kg)   12/10/18 152 lb (68.9 kg)     Body mass index is 21.56 kg/m². CBC:   Lab Results   Component Value Date    WBC 3.3 09/17/2018    RBC 3.17 09/17/2018    RBC 3.47 08/16/2017    HGB 11.1 09/17/2018    HCT 32.5 09/17/2018    .5 09/17/2018    RDW 15.0 09/17/2018     09/17/2018       CMP:   Lab Results   Component Value Date     09/17/2018    K 4.2 09/17/2018     09/17/2018    CO2 30 09/17/2018    BUN 14 02/19/2019    CREATININE 0.6 02/19/2019    GFRAA >60 02/19/2019    AGRATIO 1.7 09/17/2018    LABGLOM >60 02/19/2019    GLUCOSE 91 09/17/2018    GLUCOSE 105 08/16/2017    PROT 6.0 09/17/2018    PROT 6.2 08/16/2017    CALCIUM 9.2 09/17/2018    BILITOT 0.3 09/17/2018    ALKPHOS 53 09/17/2018    AST 13 09/17/2018    ALT 22 09/17/2018       POC Tests: No results for input(s): POCGLU, POCNA, POCK, POCCL, POCBUN, POCHEMO, POCHCT in the last 72 hours.     Coags:   Lab Results   Component Value Date    PROTIME 9.4 09/17/2018    INR 0.82 09/17/2018    APTT 30.6 09/17/2018       HCG (If Applicable): No results found for: PREGTESTUR, PREGSERUM, HCG, HCGQUANT     ABGs:   Lab Results   Component Value Date    PHART 7.309 05/23/2016    PO2ART 364 05/23/2016    CSB7KWX 39 05/23/2016    JXM3TAV 19.7 05/23/2016    BEART -7 05/23/2016    X7JFHGZT 100 05/23/2016        Type & Screen (If Applicable):  No results found for: LABABO, LABRH    Anesthesia Evaluation    Airway: Mallampati: III  TM distance: >3 FB   Neck ROM: full  Mouth opening: > = 3 FB Dental:          Pulmonary:   (+) COPD:  sleep apnea:                             Cardiovascular:    (+) hypertension:, angina:, past MI:, CAD:, CABG/stent:,         Rhythm: regular  Rate: normal                    Neuro/Psych:   (+) TIA, psychiatric history:            GI/Hepatic/Renal:   (+) PUD,           Endo/Other:                     Abdominal:           Vascular:                                        Anesthesia Plan      MAC     ASA 3       Induction: intravenous. Anesthetic plan and risks discussed with patient. Plan discussed with CRNA.                   Roxy Hyatt MD   5/6/2019

## 2019-05-06 NOTE — PROGRESS NOTES
Pt arrived to me from PACU to a same day surgery bay for phase 2 recovery monitoring and care prior to d/c in good condition. Pt is alert and oriented X4. Report received from [address] [place] [address], phase 1 recovery nurse.     S/p cystoscopy      Manuela Corbett RN, BSN, VIA [facility]  Pre-Op/Recovery   Same Day Surgery

## 2019-05-06 NOTE — OP NOTE
Urology Operative Report  [facility]    Provider: Tony Patel MD Patient ID:  Admission Date: 2019 Name: Adriana Brennan  OR Date: 2019  MRN: [id number]   Patient Location: OR/NONE : 1950  Attending: Tony Patel MD Date of Service: 2019  PCP: Ted Neves MD     Date of Operation: 2019    Preoperative Diagnosis:   1. Prostate cancer  2. Weak stream    Postoperative Diagnosis: same    Procedure:    1. Flexible cystoscopy  2. Volumetric TRUS    Surgeon:   Tony Patel MD    Anesthesia: Monitored Local Anesthesia with Sedation    Indications: Adriana Brennan is a 80 y.o. male who presents for the above named surgery. Informed consent was obtained and the risks, benefits, and details of the procedure were explained to the patient who elected to proceed. Details of Procedure: The patient was brought to the operating room and placed in the supine position on the operating room table. SCDs were placed on the lower extremities. Following induction of anesthesia the patient was positioned in a supineposition, all pressure points were padded, and the genitals were prepped and draped in the usual sterile fashion. A routine timeout was performed, confirming the patient, procedure, site, risk of fire, patient allergies and confirming that preoperative antibiotics had been administered prior to beginning. The patient's penis was examined and we noted a firm, enlarging nodule in the left corporal body, larger than last exam, and concerning for possible metastasis or his prostate cancer. We then inserted the flexible cystoscope. The anterior urethra was within normal limits. We did not noticed any significant mass effect from the mid penile mass. The prostate was normal appearing with pale mucosa. The bladder did not show any evidence of stones, tumors, or trabeculations. We did not a small mouthed bladder diverticulum at the dome.  The scope was removed and the patient was repositioned in the left lateral decubitus position. The ultrasound probe was inserted and we measured a 19.1 gram prostate. At this point the procedure was concluded. At the end of the procedure all counts were correct. The patient tolerated the procedure well and was transported to the PACU in stable condition. Findings: Penile mass enlarging. No strictures. Normal/small prostate 19.1 gm. Estimated Blood Loss: none                  Drains: none          Specimens: none    Complications: none apparent           Disposition:  PACU - hemodynamically stable. Plan:  Will continue with systemic medical therapy and can consider SPT if symptoms worsen            Brent Jovel MD  5/6/2019

## 2019-05-21 NOTE — PRE SEDATION
2 CAPSULES BY MOUTH EVERY DAY 2/11/19   VICKI Pina CNP   ARIPiprazole (ABILIFY) 10 MG tablet TAKE 1 TABLET BY MOUTH EVERY DAY 2/11/19   VICKI Pina CNP   albuterol (PROVENTIL) (5 MG/ML) 0.5% nebulizer solution Take 1 mL by nebulization 4 times daily as needed for Wheezing 12/26/18   Ariella Katz MD   Blood Pressure Monitoring (BLOOD PRESSURE MONITOR/S CUFF) MISC 1 Device by Does not apply route daily 12/18/18   VICKI Rosenberg CNP   prednisoLONE 5 MG TABS Take 5 mg by mouth daily    Historical Provider, MD   hydrocortisone 2.5 % cream Apply topically every other day Apply topically 2 times daily. Historical Provider, MD   lidocaine (LIDODERM) Place 1 patch onto the skin as needed    Historical Provider, MD   umeclidinium-vilanterol (ANORO ELLIPTA) 62.5-25 MCG/INH AEPB inhaler Inhale 1 puff into the lungs daily 12/10/18   Yazan Chappell MD   atorvastatin (LIPITOR) 20 MG tablet TAKE ONE TABLET BY MOUTH ONCE NIGHTLY 11/2/18   Maikol Siddiqi MD   Albuterol Sulfate (VENTOLIN HFA IN) Inhale into the lungs as needed     Historical Provider, MD   ondansetron (ZOFRAN ODT) 4 MG disintegrating tablet Take 1-2 tablets by mouth every 8 hours as needed for Nausea May substitute the non ODT tablets if not covered financially by the insurance plan. 9/10/18   Oh Kaminski MD   ALPRAZolam [place] Manuela) 0.25 MG tablet Take 1 mg by mouth 3 times daily. 7/16/18   Historical Provider, MD   cyclobenzaprine (FLEXERIL) 10 MG tablet TAKE ONE TABLET BY MOUTH THREE TIMES A DAY AS NEEDED FOR MUSCLE SPASMS 7/30/18   Ariella Katz MD   dronabinol (MARINOL) 2.5 MG capsule as needed. . 7/3/17   Historical Provider, MD   morphine (MS CONTIN) 30 MG extended release tablet Take 1 tablet by mouth 2 times daily . Patient taking differently: Take 30 mg by mouth every 8 hours.   8/16/17   VICKI Jeong CNP   abiraterone acetate (ZYTIGA) 250 MG tablet Take 4 tablets by mouth daily 8/2/17

## 2019-05-21 NOTE — PROGRESS NOTES
Discussed d/c instructions with pt and friend/family at bedside. Verbalized understanding. Provided pt/family with written instructions. Pain and nausea meds given; will continue to monitor. Pt has settled down a little now that he feels we have addressed his pain/nausea. Wife at bedside. Both are sweet and cooperative.

## 2019-05-24 NOTE — TELEPHONE ENCOUNTER
Brian Miller is asking to speak with Dr. Erickson Even regarding the patient and his health. She can be reached at [phone].

## 2019-05-28 NOTE — TELEPHONE ENCOUNTER
Spoke to wife today. Worried that pt does not want to use bipap for sleep apnea. Suggested that he uses it only if comfortable to do so. She would also check with Dr Ryley Pozo about this. Progression of prostate cancer now with lung mets, biopsy proven. Has appt to see Dr Giorgio Last. Respiratory status stable. No O2 needs at this time.

## 2019-05-29 PROBLEM — R11.2 NAUSEA AND VOMITING: Status: ACTIVE | Noted: 2019-01-01

## 2019-05-29 NOTE — PROGRESS NOTES
4 Eyes Skin Assessment      The patient is being assess for  Admission    I agree that 2 RN's have performed a thorough Head to Toe Skin Assessment on the patient. ALL assessment sites listed below have been assessed. Areas assessed by both nurses: head to toe  [x]   Head, Face, and Ears   [x]   Shoulders, Back, and Chest  [x]   Arms, Elbows, and Hands   [x]   Coccyx, Sacrum, and IschIum  [x]   Legs, Feet, and Heels        Does the Patient have Skin Breakdown? No   Scabbed abrasions to bilateral upper and lower extremities.          Fredrick Prevention initiated:  NA   Wound Care Orders initiated:  NA      [facility] nurse consulted for Pressure Injury (Stage 3,4, Unstageable, DTI, NWPT, and Complex wounds), New and Established Ostomies:  No      Nurse 1 eSignature: Electronically signed by Sofi Castillo RN on 5/29/19 at 6:26 PM    **SHARE this note so that the co-signing nurse is able to place an eSignature**    Nurse 2 eSignature: Electronically signed by Diana Solitario RN on 5/29/19 at 6:55 PM

## 2019-05-29 NOTE — H&P
HOSPITALISTS HISTORY AND PHYSICAL    5/29/2019 2:32 PM    Patient Information:  Trevor Navas is a 80 y.o. male [id number]  PCP:  Nella Wallace MD (Tel: [phone] )    Chief complaint:    Chief Complaint   Patient presents with    Emesis     patient in with complaints of nausea and vomiting. given 500mL bolus per the squad. in via [place]. history of prostate cancer. History of Present Illness:  Hoda David is a 80 y.o. male with a past medical history of coronary artery disease status post CABG, hypertension, hyperlipidemia, ANN and prostate cancer who presented with complaint of nausea and vomiting. The patient states that he woke up this morning with a complaint of sudden onset of acute severe nausea that was associated with recurrent vomiting. The patient stated that he felt tired, weak and started to have stomach pain and throat pain. Came to the emergency department and he received Reglan, Zofran and Phenergan with no improvement in his nausea. Per the ED department provider they contacted the oncologist who suggested admission and supportive measures. REVIEW OF SYSTEMS:   Constitutional: Negative for fever,chills or night sweats  ENT: Negative for rhinorrhea, epistaxis, hoarseness, sore throat. Respiratory: Negative for shortness of breath,wheezing  Cardiovascular: Negative for chest pain, palpitations   Gastrointestinal: Negative for nausea, vomiting, diarrhea  Genitourinary: Negative for polyuria, dysuria   Hematologic/Lymphatic: Negative for bleeding tendency, easy bruising  Musculoskeletal: Negative for myalgias and arthralgias  Neurologic: Negative for confusion,dysarthria. Skin: Negative for itching,rash  Psychiatric: Negative for depression,anxiety, agitation. Endocrine: Negative for polydipsia,polyuria,heat /cold intolerance.     Past Medical History:   has a past medical history of ADHD (attention deficit the lungs daily 1 each 3    atorvastatin (LIPITOR) 20 MG tablet TAKE ONE TABLET BY MOUTH ONCE NIGHTLY 90 tablet 3    ondansetron (ZOFRAN ODT) 4 MG disintegrating tablet Take 1-2 tablets by mouth every 8 hours as needed for Nausea May substitute the non ODT tablets if not covered financially by the insurance plan. 20 tablet 0    abiraterone acetate (ZYTIGA) 250 MG tablet Take 4 tablets by mouth daily 120 tablet 3    aspirin 81 MG tablet Take 1 tablet by mouth daily 30 tablet 3    potassium chloride (KLOR-CON) 10 MEQ extended release tablet Take 2 tablets by mouth daily 60 tablet 3    tamsulosin (FLOMAX) 0.4 MG capsule Take 1 capsule by mouth daily 30 capsule 3    prochlorperazine (COMPAZINE) 5 MG tablet Take 1 tablet by mouth every 6 hours as needed for Nausea 120 tablet 3    Blood Pressure Monitoring (BLOOD PRESSURE MONITOR/S CUFF) MISC 1 Device by Does not apply route daily 1 each 0       Allergies: Allergies   Allergen Reactions    Dicyclomine Other (See Comments)     Muscle spasms    Dye [Iodides] Swelling     ivp dye     Eliquis [Apixaban] Nausea Only    Lactose Intolerance (Gi)     Pcn [Penicillins] Swelling     itch    Plavix  [Clopidogrel Bisulfate]     Plavix [Clopidogrel] Other (See Comments)     Flu like sx        Social History:   reports that he has never smoked. His smokeless tobacco use includes snuff. He reports that he does not drink alcohol or use drugs. Family History:  family history includes Hypertension (age of onset: 68) in his father; Other (age of onset: 80) in his mother; Parkinsonism in his father. Physical Exam:  /71   Pulse 61   Temp 98.4 °F (36.9 °C) (Oral)   Resp 15   Wt 150 lb (68 kg)   SpO2 97%   BMI 22.15 kg/m²     General appearance:  Appears comfortable. Well nourished  Eyes: Sclera clear, pupils equal  ENT: Moist mucus membranes, no thrush. Trachea midline. Cardiovascular: Regular rhythm, normal S1, S2. No murmur, gallop, rub.  No edema in lower extremities  Respiratory: Clear to auscultation bilaterally, no wheeze, good inspiratory effort  Gastrointestinal: Abdomen soft, non-tender, not distended, normal bowel sounds  Musculoskeletal: No cyanosis in digits, neck supple  Neurology: Cranial nerves grossly intact. Alert and oriented in time, place and person. No speech or motor deficits  Psychiatry: Appropriate affect. Not agitated  Skin: Warm, dry, normal turgor, no rash    Labs:  CBC:   Lab Results   Component Value Date    WBC 4.8 05/29/2019    RBC 3.15 05/29/2019    RBC 3.47 08/16/2017    HGB 10.7 05/29/2019    HCT 31.7 05/29/2019    .4 05/29/2019    MCH 33.9 05/29/2019    MCHC 33.8 05/29/2019    RDW 14.7 05/29/2019     05/29/2019    MPV 7.7 05/29/2019     BMP:    Lab Results   Component Value Date     05/29/2019    K 3.5 05/29/2019     05/29/2019    CO2 21 05/29/2019    BUN 11 05/29/2019    CREATININE 0.7 05/29/2019    CALCIUM 9.3 05/29/2019    GFRAA >60 05/29/2019    LABGLOM >60 05/29/2019    GLUCOSE 110 05/29/2019    GLUCOSE 105 08/16/2017       Chest Xray:   EKG:    I visualized CXR images and EKG strips     Discussed care with ED provider     Problem List  Active Problems:    * No active hospital problems. *  Resolved Problems:    * No resolved hospital problems. *        Assessment/Plan:   Intractable nausea and vomiting associated with epigastric pain, unclear etiology,? Chemotherapy side effect versus gastroenteritis. - Admit to telemetry  - Start IV fluid maintenance  - Phenergan when necessary  - Clear liquid diet and advance as tolerated  - Will give dexamethasone IV 8 mg, the patient is already on prednisone doses at home but he cannot tolerate it. - Protonix IV  - Since the patient is not retaining oral pain medication than We'll order IV pain medication.  - If no improvement and status then will consult GI. CAD status post CABG, continue with home medications.     Prostate cancer status post chemotherapy, we'll

## 2019-05-29 NOTE — ED PROVIDER NOTES
I independently performed a history and physical on Arsalan Rees. All diagnostic, treatment, and disposition decisions were made by myself in conjunction with the mid-level provider. Patient arrived with vomiting which started earlier today. He has metastatic prostate cancer, has not been able to take his medications and is diaphoretic and frankly miserable here. No particular abdominal tenderness, CT does not show any acute issues other than some questionable chronic inflammation the bladder, the patient's provided a urine sample at this time, he does have a penile mass for which he will be getting a suprapubic catheter but is able to urinate on his own. For further details of [facility] emergency department encounter, please see [facility], [place], operated by [facility] documentation.             Miguel Sosa MD  06/06/19 0434

## 2019-05-29 NOTE — PROGRESS NOTES
Patient arrived to floor in stable condition. Patient ambulated to bed. Bed alarm engaged. VSS. Pt refusing meds, want's to wait until his stomach feels better. Repositioned. Admission assessment complete, pt A&OX4- has some possible intermittent confusion but could be related to hearing as I don't think he understands the question sometimes. No further needs expressed. Call light within reach. Will continue to monitor.

## 2019-05-29 NOTE — ED NOTES
Patient calling nurse again, reporting medications aren't helping, patient believes he's in withdrawal at this time, due to the fact that patient take oxycodone every 4 hours for cancer, and hasn't had it since yesterday due to the vomiting, This RN made jacob aware, will continue to katie Bangura RN  05/29/19 6584
Patient into ER from home via EMS, reporting he's been nauseous and vomiting since 11pm last night, and has history of prostate cancer, patient is screaming from bed to nurses station\"NURSE,  Justice Dawson" this RN answered, \"I need medicine now!!\" This nurse informed pt as did charge nurse, pt needs to see MD first before medications ordered, patient continues to yell, \"help me! Help me! \" once orders placed, patient medicated quickly. Will continue to monitor.       [facility] Alexia, RN  05/29/19 3200
Patient medicated with dilaudid and reaglan for pain and nausea, per [facility], patient reports feeling alittle better at this time.       Francesca Alexander RN  05/29/19 5507
Pt in [facility] 141, RN  05/29/19 1055
ONCE NIGHTLY 90 tablet 3    ondansetron (ZOFRAN ODT) 4 MG disintegrating tablet Take 1-2 tablets by mouth every 8 hours as needed for Nausea May substitute the non ODT tablets if not covered financially by the insurance plan. 20 tablet 0    abiraterone acetate (ZYTIGA) 250 MG tablet Take 4 tablets by mouth daily 120 tablet 3    aspirin 81 MG tablet Take 1 tablet by mouth daily 30 tablet 3    potassium chloride (KLOR-CON) 10 MEQ extended release tablet Take 2 tablets by mouth daily 60 tablet 3    tamsulosin (FLOMAX) 0.4 MG capsule Take 1 capsule by mouth daily 30 capsule 3    prochlorperazine (COMPAZINE) 5 MG tablet Take 1 tablet by mouth every 6 hours as needed for Nausea 120 tablet 3    Blood Pressure Monitoring (BLOOD PRESSURE MONITOR/S CUFF) MISC 1 Device by Does not apply route daily 1 each 0    [DISCONTINUED] prednisoLONE 5 MG TABS Take 5 mg by mouth daily      [DISCONTINUED] Albuterol Sulfate (VENTOLIN HFA IN) Inhale into the lungs as needed       [DISCONTINUED] ALPRAZolam (XANAX) 0.25 MG tablet Take 1 mg by mouth 3 times daily. [DISCONTINUED] cyclobenzaprine (FLEXERIL) 10 MG tablet TAKE ONE TABLET BY MOUTH THREE TIMES A DAY AS NEEDED FOR MUSCLE SPASMS 30 tablet 2    [DISCONTINUED] dronabinol (MARINOL) 2.5 MG capsule as needed. .      [DISCONTINUED] morphine (MS CONTIN) 30 MG extended release tablet Take 1 tablet by mouth 2 times daily .  (Patient taking differently: Take 30 mg by mouth every 8 hours. ) 60 tablet 0    [DISCONTINUED] hydrOXYzine (ATARAX) 50 MG tablet Take 1 tablet by mouth every 6 hours as needed for Itching or Anxiety (Has skin picking and anxiety) 60 tablet 5    [DISCONTINUED] traZODone (DESYREL) 100 MG tablet TAKE ONE TABLET BY MOUTH ONCE NIGHTLY (Patient taking differently: nightly as needed TAKE ONE TABLET BY MOUTH ONCE NIGHTLY) 30 tablet 3

## 2019-05-29 NOTE — ED PROVIDER NOTES
all other systems were reviewed and negative. PAST MEDICAL HISTORY     Past Medical History:   Diagnosis Date    ADHD (attention deficit hyperactivity disorder)     [facility] 1 [facility]     CAD (coronary artery disease) 2016    Cancer ([facility]     BONE    Cerebrovascular disease 2016    5 TIAs in 3 weeks - R side weakness, numbness R face    COPD (chronic obstructive pulmonary disease) (HCC)     Depression     Hard of hearing     left ear     Hyperlipidemia     Hypertension     Lactose intolerance     OCD (obsessive compulsive disorder)     PFO (patent foramen ovale) 2016    Prolonged Q-T interval on ECG 06/2017    Prostate cancer ([facility]     PUD (peptic ulcer disease) 6/1980    GI bleed (\"lost half blood volume\"). no surgery.  Sleep apnea     severe--uses CPAP    Suicide attempt [facility]) July, 2014    [facility] hospitalization    Wound infection July 17, 2014    L thigh. [facility] hospitalization         SURGICAL HISTORY       Past Surgical History:   Procedure Laterality Date    CARDIAC SURGERY      COLONOSCOPY  06/05/2017    CORONARY ARTERY BYPASS GRAFT  5/23/16    cabgx3, PFO closure Rere Patel)    FEMUR FRACTURE SURGERY Left 09/21/2017    Prophylactic IM nail left femur for impending fracture    LUMBAR LAMINECTOMY  1995    TONSILLECTOMY           CURRENT MEDICATIONS       Previous Medications    ABIRATERONE ACETATE (ZYTIGA) 250 MG TABLET    Take 4 tablets by mouth daily    ALBUTEROL (PROVENTIL) (5 MG/ML) 0.5% NEBULIZER SOLUTION    Take 1 mL by nebulization 4 times daily as needed for Wheezing    ALPRAZOLAM (XANAX) 1 MG TABLET    Take 1 mg by mouth 3 times daily as needed for Sleep.     ARIPIPRAZOLE (ABILIFY) 10 MG TABLET    TAKE 1 TABLET BY MOUTH EVERY DAY    ASPIRIN 81 MG TABLET    Take 1 tablet by mouth daily    ATORVASTATIN (LIPITOR) 20 MG TABLET    TAKE ONE TABLET BY MOUTH ONCE NIGHTLY    BLOOD PRESSURE MONITORING (BLOOD PRESSURE MONITOR/S CUFF) MISC    1 Device by Does not apply route daily    HYDROCORTISONE 2.5 % CREAM    Apply topically every other day Apply topically 2 times daily. METOPROLOL TARTRATE (LOPRESSOR) 25 MG TABLET    TAKE 1 TABLET BY MOUTH TWICE A DAY    MORPHINE (MS CONTIN) 30 MG EXTENDED RELEASE TABLET    Take 30 mg by mouth 3 times daily. ONDANSETRON (ZOFRAN ODT) 4 MG DISINTEGRATING TABLET    Take 1-2 tablets by mouth every 8 hours as needed for Nausea May substitute the non ODT tablets if not covered financially by the insurance plan. OXYCODONE-ACETAMINOPHEN (PERCOCET)  MG PER TABLET    Take 1 tablet by mouth every 4-6 hours as needed for Pain. PANTOPRAZOLE (PROTONIX) 40 MG TABLET    Take 40 mg by mouth daily    POTASSIUM CHLORIDE (KLOR-CON) 10 MEQ EXTENDED RELEASE TABLET    Take 2 tablets by mouth daily    PREDNISONE (DELTASONE) 5 MG TABLET    Take 5 mg by mouth 2 times daily    PROCHLORPERAZINE (COMPAZINE) 5 MG TABLET    Take 1 tablet by mouth every 6 hours as needed for Nausea    TAMSULOSIN (FLOMAX) 0.4 MG CAPSULE    Take 1 capsule by mouth daily    UMECLIDINIUM-VILANTEROL (ANORO ELLIPTA) 62.5-25 MCG/INH AEPB INHALER    Inhale 1 puff into the lungs daily    VENLAFAXINE (EFFEXOR XR) 150 MG EXTENDED RELEASE CAPSULE    TAKE 2 CAPSULES BY MOUTH EVERY DAY         ALLERGIES     Dicyclomine; Dye [iodides]; Eliquis [apixaban]; Lactose intolerance (gi); Pcn [penicillins]; Plavix  [clopidogrel bisulfate]; and Plavix [clopidogrel]    FAMILY HISTORY       Family History   Problem Relation Age of Onset    Hypertension Father 68        , Parkinson's, HTN.  Parkinsonism Father    Taylor Other Mother 80        Alive - hip replacement.            SOCIAL HISTORY       Social History     Socioeconomic History    Marital status:      Spouse name: None    Number of children: 2    Years of education: None    Highest education level: None   Occupational History    None   Social Needs    Financial resource strain: None    Food insecurity: concern for nausea and vomiting. Treated with Zofran and Phenergan and IV fluids. No significant improvement. He was then given Dilaudid and Reglan with improvement in vomiting but continued nausea. Laboratory evaluation and CT imaging are negative. I spoke with oncology, Dr. Vishal Lima. He feels inpatient management for intractable vomiting is reasonable. I agree. The hospitalist was consulted and is in agreement for admission. This plan was discussed with the patient, and my attending, Julissa Almanzar MD, and both are in agreement with the plan. Please see attending note. FINAL IMPRESSION      1.  Intractable vomiting with nausea, unspecified vomiting type          DISPOSITION/PLAN   DISPOSITION Decision To Admit 05/29/2019 02:12:47 PM      (Please note that portions of this note were completed with a voice recognition program.  Efforts were made to edit the dictations but occasionally words are mis-transcribed.)    VICKI Ackerman CNP (electronically signed)        VICKI Ackerman CNP  05/29/19 5023

## 2019-05-30 NOTE — PROGRESS NOTES
[facility] PROGRESS NOTE    5/30/2019 11:42 AM        Name: Lali Rendon . Admitted: 5/29/2019  Primary Care Provider: Marcelina Rosado MD (Tel: [phone])      Subjective: Kate Spence   Pt is sitting in bed  He is complaining of generalized pain      Reviewed interval ancillary notes    Current Medications    oxyCODONE-acetaminophen (PERCOCET)  MG per tablet 1 tablet Q4H PRN   morphine (MS CONTIN) extended release tablet 30 mg TID   ondansetron (ZOFRAN) injection 4 mg Q30 Min PRN   albuterol (PROVENTIL) nebulizer solution 5 mg 4x Daily PRN   [START ON 6/4/2019] aspirin EC tablet 81 mg Daily   atorvastatin (LIPITOR) tablet 10 mg Nightly   tamsulosin (FLOMAX) capsule 0.4 mg Nightly   venlafaxine (EFFEXOR XR) extended release capsule 150 mg Daily with breakfast   ARIPiprazole (ABILIFY) tablet 10 mg Daily   metoprolol tartrate (LOPRESSOR) tablet 25 mg BID   lactated ringers infusion Continuous   promethazine (PHENERGAN) injection 12.5 mg Q6H PRN   sodium chloride flush 0.9 % injection 10 mL 2 times per day   sodium chloride flush 0.9 % injection 10 mL PRN   magnesium hydroxide (MILK OF MAGNESIA) 400 MG/5ML suspension 30 mL Daily PRN   ondansetron (ZOFRAN) injection 4 mg Q6H PRN   enoxaparin (LOVENOX) injection 40 mg Nightly   magnesium sulfate 1 g in dextrose 5% 100 mL IVPB PRN   potassium chloride (KLOR-CON M) extended release tablet 40 mEq PRN   Or    potassium bicarb-citric acid (EFFER-K) effervescent tablet 40 mEq PRN   Or    potassium chloride 10 mEq/100 mL IVPB (Peripheral Line) PRN   pantoprazole (PROTONIX) injection 40 mg Daily   [Held by provider] HYDROmorphone HCl PF (DILAUDID) injection 0.25 mg Q6H PRN   Or    [Held by provider] HYDROmorphone HCl PF (DILAUDID) injection 0.5 mg Q6H PRN   dexamethasone (DECADRON) injection 8 mg Daily   LORazepam (ATIVAN) injection 0.5 mg Q8H PRN   ALPRAZolam (XANAX) tablet 1 mg TID PRN   glycopyrrolate-formoterol (BEVESPI) 9-4.8 MCG/ACT inhaler 2 puff BID       Objective:  BP (!) 150/90   Pulse 72   Temp 98.3 °F (36.8 °C) (Oral)   Resp 16   Ht 5' 9\" (1.753 m)   Wt 150 lb (68 kg)   SpO2 97%   BMI 22.15 kg/m²     Intake/Output Summary (Last 24 hours) at 5/30/2019 1142  Last data filed at 5/29/2019 2317  Gross per 24 hour   Intake --   Output 920 ml   Net -920 ml    Wt Readings from Last 3 Encounters:   05/29/19 150 lb (68 kg)   05/21/19 146 lb (66.2 kg)   05/06/19 146 lb (66.2 kg)       General appearance:  Appears comfortable  Eyes: Sclera clear. Pupils equal.  ENT: Moist oral mucosa. Trachea midline, no adenopathy. Cardiovascular: Regular rhythm, normal S1, S2. No murmur. No edema in lower extremities  Respiratory: Not using accessory muscles. Good inspiratory effort. Clear to auscultation bilaterally, no wheeze or crackles. GI: Abdomen soft, no tenderness, not distended, normal bowel sounds  Musculoskeletal: No cyanosis in digits, neck supple  Neurology: CN 2-12 grossly intact. No speech or motor deficits  Psych: Normal affect. Alert and oriented in time, place and person  Skin: Warm, dry, normal turgor    Labs and Tests:  CBC:   Recent Labs     05/29/19  1031 05/30/19  0906   WBC 4.8 3.7*   HGB 10.7* 10.1*    205     BMP:  Recent Labs     05/29/19  1031 05/30/19  0906    142   K 3.5 3.4*    106   CO2 21 23   BUN 11 6*   CREATININE 0.7* 0.7*   GLUCOSE 110* 107*     Hepatic: Recent Labs     05/29/19  1031 05/30/19  0906   AST 16 15   ALT 16 15   BILITOT <0.2 0.5   ALKPHOS 71 71     Problem List  Active Problems:    Nausea and vomiting  Resolved Problems:    * No resolved hospital problems. *       Assessment & Plan:   Intractable nausea and vomiting associated with epigastric pain, unclear etiology,after discussing the issue with oncology, it seem that the pt is on Chemotherapy for long time, ? Gastroenteritis.  No vomiting since he is in the hospital and he is tolerating oral diet   - Admitted to telemetry  - Started IV fluid maintenance  - Phenergan when necessary  - Clear liquid diet and advance as tolerated  - will lower dexamethasone graham to 4 mg  - Protonix IV  - pt is requesting his home oral medications, Oncology adjusted it   - If no improvement and status then will consult GI.     CAD status post CABG, continue with home medications.     Prostate cancer status post chemotherapy, we'll defer ordering chemotherapy to oncology team.  Oncology consult appreciated recommendations.     Pulmonary nodules, status post CT-guided biopsy, followed by pulmonologist as an outpatient.     Hypertension, blood pressure initially at the ED was elevated, possibly in the setting of pain, no pressure was running on the low side, we'll carefully monitor his blood pressure in the setting of vomiting and not retaining his pills, we'll add IV blood pressure medications.     Hyperlipidemia, continue on statin     ANN, CPAP during nighttime.           Diet: DIET CLEAR LIQUID; Lactose controlled  Dietary Nutrition Supplements: Clear Liquid Oral Supplement  Code:Full Code  DVT PPX lovenox      Socorro Li MD   5/30/2019 11:42 AM

## 2019-05-30 NOTE — PROGRESS NOTES
Patient agitated, restless, and moving around in bed constantly. Prn ativan administered to help patient with agitation. Patient upset and worried about his pain medication not being ordered. Waiting on response from MD. Bed alarm engaged. No further needs expressed. Call light within reach. Will continue to monitor.

## 2019-05-30 NOTE — PLAN OF CARE
Nutrition Problem: Inadequate oral intake  Intervention: Food and/or Nutrient Delivery: Continue current diet, Start ONS  Nutritional Goals: Pt will tolerate diet advancement and consume at least 50% of meals and supplements

## 2019-05-30 NOTE — CONSULTS
Nutrition Assessment    Type and Reason for Visit: Consult(Poor PO intake/appetite)    Nutrition Recommendations:   1. Trial Ensure Clear BID  2. Diet advancement per MD  3. Obtain actual body weight; current body weight is stated. 4. Encourage PO intake  5. Document all PO intake in flow sheets     Nutrition Assessment: Pt unable to stay awake for RD interview this morning; information obtained from chart. Pt with hx prostate cancer, undergoing chemotherapy. Pt admitted with nausea and vomiting, currently on clear liquid diet. No PO intake noted yet. Unable to assess for weight loss as current body weight is stated. Will trial Ensure Clear BID. Pt will be at risk for nutrition compromise until diet is advanced to solid food and pt consuming at least 50% of meals and supplements.       Malnutrition Assessment:  · Malnutrition Status: Insufficient data    Nutrition Risk Level: High    Nutrient Needs:  · Estimated Daily Total Kcal: 7481-4645   · Estimated Daily Protein (g):  grams   · Estimated Daily Total Fluid (ml/day): 1 mL per kcal     Nutrition Diagnosis:   · Problem: Inadequate oral intake  · Etiology: related to Nausea, Vomiting     Signs and symptoms:  as evidenced by Diet history of poor intake(Suspected inadequate PO intake prior to admission d/t nausea and vomiting)    Objective Information:  · Nutrition-Focused Physical Findings: No edema noted   · Wound Type: None  · Current Nutrition Therapies:  · Oral Diet Orders: Clear Liquid   · Oral Diet intake: Unable to assess  · Oral Nutrition Supplement (ONS) Orders: None  · Anthropometric Measures:  · Ht: 5' 9\" (175.3 cm)   · Current Body Wt: 150 lb (68 kg)(stated )  · Ideal Body Wt: 160 lb (72.6 kg)   · BMI Classification: BMI 18.5 - 24.9 Normal Weight    Nutrition Interventions:   Continue current diet, Start ONS  Continued Inpatient Monitoring, Education Not Indicated    Nutrition Evaluation:   · Evaluation: Goals set   · Goals: Pt will tolerate diet advancement and consume at least 50% of meals and supplements     · Monitoring: Diet Tolerance, Nutrition Progression, Supplement Intake, Nausea or Vomiting, Weight, Pertinent Labs      Electronically signed by Merary Bello RD, MAGI on 5/30/19 at 9:15 AM     Contact Number: [phone]

## 2019-05-30 NOTE — PROGRESS NOTES
Patient is noted to be asleep in bed, no s/s of distress noted. RR >12/min. Bed alarm engaged. No further needs expressed. Call light within reach. Will continue to monitor.

## 2019-05-30 NOTE — PROGRESS NOTES
Messaged  NP regarding pt's pain and BP. NP no longer on call. See message below. [phone] Hospital or Facility: [facility] From: Courtney Anders RE: Juan Jose Krishnamurthy 1950 RM: [id number] Pt here with N/V and metastatic prostate cancer. Pt receives Dilaudid for c/o hip pain. pt now c/o headache that is a 7/10. No other pain meds available. Pt not due for Dilaudid,. current vitals are: /89, pulse, 69, resp 16, spo2, 95% room air and Temp, 98.9. pt denies blurred vison and dizziness. Please advise.  Need Callback: NO CALLBACK REQ 5T ONCOLOGY

## 2019-05-30 NOTE — CONSULTS
[facility]   Progress Note      5/30/2019 9:23 AM        Name: Chris Arora . Admitted: 5/29/2019    HPI:  Isabel Cortes is a patient of Dr. José Antonio Traylor with metastatic prostate cancer. Current treatment includes Zytiga, Lupron, and Xgeva. He has an enlarging penile mass which causes pain. He has been seen by radiation oncology and urology. Dr. Alex Harmon was planning radiation to the mass, and a suprapubic catheter placement was planned as well. He had recent biopsy of lung nodules, result showed positive for malignancy, likely metastatic from prostate. He presented to the ED with intractable nausea and vomiting. He was given Zofran, Phenergan, and Reglan in the ED without much improvement. He is anxious, he says he feels like he is having withdrawal symptoms.      Reviewed interval ancillary notes    Current Medications    ondansetron (ZOFRAN) injection 4 mg Q30 Min PRN   albuterol (PROVENTIL) nebulizer solution 5 mg 4x Daily PRN   [START ON 6/4/2019] aspirin EC tablet 81 mg Daily   atorvastatin (LIPITOR) tablet 10 mg Nightly   tamsulosin (FLOMAX) capsule 0.4 mg Nightly   venlafaxine (EFFEXOR XR) extended release capsule 150 mg Daily with breakfast   ARIPiprazole (ABILIFY) tablet 10 mg Daily   metoprolol tartrate (LOPRESSOR) tablet 25 mg BID   lactated ringers infusion Continuous   promethazine (PHENERGAN) injection 12.5 mg Q6H PRN   sodium chloride flush 0.9 % injection 10 mL 2 times per day   sodium chloride flush 0.9 % injection 10 mL PRN   magnesium hydroxide (MILK OF MAGNESIA) 400 MG/5ML suspension 30 mL Daily PRN   ondansetron (ZOFRAN) injection 4 mg Q6H PRN   enoxaparin (LOVENOX) injection 40 mg Nightly   magnesium sulfate 1 g in dextrose 5% 100 mL IVPB PRN   potassium chloride (KLOR-CON M) extended release tablet 40 mEq PRN   Or    potassium bicarb-citric acid (EFFER-K) effervescent tablet 40 mEq PRN   Or    potassium chloride 10 mEq/100 mL IVPB (Peripheral Line) PRN   pantoprazole (PROTONIX) injection 40 mg Daily   HYDROmorphone HCl PF (DILAUDID) injection 0.25 mg Q6H PRN   Or    HYDROmorphone HCl PF (DILAUDID) injection 0.5 mg Q6H PRN   dexamethasone (DECADRON) injection 8 mg Daily   LORazepam (ATIVAN) injection 0.5 mg Q8H PRN   ALPRAZolam (XANAX) tablet 1 mg TID PRN   glycopyrrolate-formoterol (BEVESPI) 9-4.8 MCG/ACT inhaler 2 puff BID       Objective:  BP (!) 150/90   Pulse 72   Temp 98.3 °F (36.8 °C) (Oral)   Resp 16   Ht 5' 9\" (1.753 m)   Wt 150 lb (68 kg)   SpO2 97%   BMI 22.15 kg/m²     Intake/Output Summary (Last 24 hours) at 5/30/2019 0923  Last data filed at 5/29/2019 2317  Gross per 24 hour   Intake --   Output 920 ml   Net -920 ml    Wt Readings from Last 3 Encounters:   05/29/19 150 lb (68 kg)   05/21/19 146 lb (66.2 kg)   05/06/19 146 lb (66.2 kg)       General appearance:  Appears comfortable  Eyes: Sclera clear. Pupils equal.  ENT: Moist oral mucosa. Trachea midline, no adenopathy. Cardiovascular: Regular rhythm, normal S1, S2. No murmur. No edema in lower extremities  Respiratory: Not using accessory muscles. Good inspiratory effort. Clear to auscultation bilaterally, no wheeze or crackles. GI: Abdomen soft, no tenderness, not distended  Musculoskeletal: No cyanosis in digits, neck supple  Neurology: CN 2-12 grossly intact. No speech or motor deficits  Psych: Normal affect. Alert and oriented in time, place and person  Skin: Warm, dry, normal turgor    Labs and Tests:  CBC:   Recent Labs     05/29/19  1031 05/30/19  0906   WBC 4.8 3.7*   HGB 10.7* 10.1*    205     BMP:  Recent Labs     05/29/19  1031      K 3.5      CO2 21   BUN 11   CREATININE 0.7*   GLUCOSE 110*     Hepatic: Recent Labs     05/29/19  1031   AST 16   ALT 16   BILITOT <0.2   ALKPHOS 71       ASSESSMENT AND PLAN    Active Problems:    Nausea and vomiting  Resolved Problems:    * No resolved hospital problems. *      Nausea/vomiting  - ?  Gastroenteritis   - possibly d/t Zytiga, but he has been on Zytiga since July 2017 with no previous problems  - continue antiemetics PRN    Metastatic prostate cancer  - on Zytiga, Lupron, Xgeva  - possible new lung mets, bx on 5/21/19 result positive for malignancy    Penile mass/pain  - was planning on having suprapubic catheter placed Julieta 3  - consult urology, he has seen Dr. Wesley Mckoy  - will let rad onc know of admission   - will resume home pain medications    Vahid Martinez, [address] [facility]  [facility]      Patient interviewed and examined agree with note above

## 2019-05-30 NOTE — PROGRESS NOTES
Patient resting in bed, waiting for dinner. Pt stating he \"feels so much better. \" bed alarm engaged. No further needs expressed. Call light within reach. Will continue to monitor. no

## 2019-05-30 NOTE — PROGRESS NOTES
BP improved. Vitals:    05/30/19 0748   BP:    Pulse:    Resp: 16   Temp:    SpO2: 91%     Bedside report completed with ALINA fuentes. Call light within reach, bed alarm engaged. Wife at bedside. Pt appears drowsy.

## 2019-05-30 NOTE — PROGRESS NOTES
VSS; respirations even, easy, and unlabored. Shift assessment complete, pt A&OX4 but drowsy. Awakens to voice. Scheduled medications administered. Pt takes zytiga, but does not have it here with him. Wife went home. IV flushed, infusing per orders. Bed alarm engaged. No further needs expressed. Call light within reach. Will continue to monitor.

## 2019-05-30 NOTE — PROGRESS NOTES
BP improving. VSS. Replacement potassium given per orders. Patient awakens to voice and touch. Bed alarm engaged. No further needs expressed. Call light within reach. Will continue to monitor.    Vitals:    05/30/19 1640   BP: (!) 154/92   Pulse: 79   Resp: 16   Temp: 97.2 °F (36.2 °C)   SpO2:

## 2019-05-30 NOTE — PROGRESS NOTES
Home pain medications restarted. MS contin and percocet administered for pain. Patient sitting on edge of bed. Dr. Cisneros Revering in room with patient. Bed alarm engaged. No further needs expressed. Call light within reach. Will continue to monitor.

## 2019-05-30 NOTE — PROGRESS NOTES
Patient c/o uncontrolled pain and stating that he \"feels like he's going through withdrawal.\" MD paged to see if we can get his home pain regimen ordered.

## 2019-05-30 NOTE — PLAN OF CARE
Problem: Falls - Risk of:  Goal: Will remain free from falls  Description  Will remain free from falls  5/30/2019 0825 by Radha Jacob RN  Outcome: Ongoing  5/30/2019 0823 by Radha Jacob RN  Outcome: Ongoing  Goal: Absence of physical injury  Description  Absence of physical injury  5/30/2019 0825 by Radha Jacob RN  Outcome: Ongoing  5/30/2019 0823 by Radha Jacob RN  Outcome: Ongoing     Problem: Pain:  Goal: Pain level will decrease  Description  Pain level will decrease  5/30/2019 0825 by Radha Jacob RN  Outcome: Ongoing  5/30/2019 0823 by Radha Jacob RN  Outcome: Ongoing  Goal: Control of acute pain  Description  Control of acute pain  5/30/2019 0825 by Radha Jacob RN  Outcome: Ongoing  5/30/2019 0823 by Radha Jacob RN  Outcome: Ongoing  Goal: Control of chronic pain  Description  Control of chronic pain  5/30/2019 0825 by Radha Jacob RN  Outcome: Ongoing  5/30/2019 0823 by Radha Jacob RN  Outcome: Ongoing

## 2019-05-31 NOTE — BRIEF OP NOTE
Brief Postoperative Note    Shellie Ragsdale  YOB: 1950  [id number]    Pre-operative Diagnosis: Penile mass  Post-operative Diagnosis: Same    Procedure: Ultrasound guided biopsy    Anesthesia: Local    Surgeons/Assistants: Phalen    Estimated Blood Loss: less than 50     Complications: None    Specimens: Was Obtained: 5 20g core biopsies, suspended in formalin. Findings:   Approach- Base of dorsal penile shaft to hypoechoic vascular mass, measuring 2-3 cm in length.       Electronically signed by Dejon Galindo MD on 5/31/2019 at 11:17 AM

## 2019-05-31 NOTE — PROGRESS NOTES
PCA ambulated with patient in hallway X2 laps. Pt tolerated well. Prn xanax given for c/o anxiety and restlessness. Routine VSS. Bed alarm engaged. No further needs expressed. Call light within reach. Will continue to monitor.    Vitals:    05/31/19 1612   BP: (!) 165/82   Pulse: 73   Resp: 16   Temp: 98.9 °F (37.2 °C)   SpO2: 96%

## 2019-05-31 NOTE — PROGRESS NOTES
Patient assisted to bathroom, voided and brushed teeth. Back to bed. Bed alarm engaged. No further needs expressed. Call light within reach. Will continue to monitor.

## 2019-05-31 NOTE — PROGRESS NOTES
Patient returned to floor at this time. Patient A&OX4. MRI checklist completed and reviewed with patient. This RN and patient answered all questions to the best of our knowledge and ability. Faxed checklist to MRI department. Penile bx site dressing is clean, dry, and intact. No bleeding noted. Ice pack on penis at this time. Bed alarm engaged. No further needs expressed. Call light within reach. Will continue to monitor.

## 2019-05-31 NOTE — PROGRESS NOTES
[facility]   Progress Note      5/31/2019 8:55 AM        Name: Shailesh Good . Admitted: 5/29/2019    SUBJECTIVE:  He is feeling better today. He states he had terrible nausea with headache yesterday.       Reviewed interval ancillary notes    Current Medications    oxyCODONE-acetaminophen (PERCOCET)  MG per tablet 1 tablet Q4H PRN   morphine (MS CONTIN) extended release tablet 30 mg TID   dexamethasone (DECADRON) injection 4 mg Daily   ALPRAZolam (XANAX) tablet 0.5 mg TID PRN   albuterol (PROVENTIL) nebulizer solution 5 mg 4x Daily PRN   [START ON 6/4/2019] aspirin EC tablet 81 mg Daily   atorvastatin (LIPITOR) tablet 10 mg Nightly   tamsulosin (FLOMAX) capsule 0.4 mg Nightly   venlafaxine (EFFEXOR XR) extended release capsule 150 mg Daily with breakfast   ARIPiprazole (ABILIFY) tablet 10 mg Daily   metoprolol tartrate (LOPRESSOR) tablet 25 mg BID   lactated ringers infusion Continuous   promethazine (PHENERGAN) injection 12.5 mg Q6H PRN   sodium chloride flush 0.9 % injection 10 mL 2 times per day   sodium chloride flush 0.9 % injection 10 mL PRN   magnesium hydroxide (MILK OF MAGNESIA) 400 MG/5ML suspension 30 mL Daily PRN   ondansetron (ZOFRAN) injection 4 mg Q6H PRN   enoxaparin (LOVENOX) injection 40 mg Nightly   magnesium sulfate 1 g in dextrose 5% 100 mL IVPB PRN   potassium chloride (KLOR-CON M) extended release tablet 40 mEq PRN   Or    potassium bicarb-citric acid (EFFER-K) effervescent tablet 40 mEq PRN   Or    potassium chloride 10 mEq/100 mL IVPB (Peripheral Line) PRN   pantoprazole (PROTONIX) injection 40 mg Daily   [Held by provider] HYDROmorphone HCl PF (DILAUDID) injection 0.25 mg Q6H PRN   Or    [Held by provider] HYDROmorphone HCl PF (DILAUDID) injection 0.5 mg Q6H PRN   LORazepam (ATIVAN) injection 0.5 mg Q8H PRN   glycopyrrolate-formoterol (BEVESPI) 9-4.8 MCG/ACT inhaler 2 puff BID       Objective:  BP (!) 153/83   Pulse 62   Temp 97.8 °F (36.6 °C) (Oral) Resp 16   Ht 5' 9\" (1.753 m)   Wt 150 lb (68 kg)   SpO2 96%   BMI 22.15 kg/m²     Intake/Output Summary (Last 24 hours) at 5/31/2019 0855  Last data filed at 5/31/2019 0315  Gross per 24 hour   Intake 965 ml   Output 750 ml   Net 215 ml    Wt Readings from Last 3 Encounters:   05/29/19 150 lb (68 kg)   05/21/19 146 lb (66.2 kg)   05/06/19 146 lb (66.2 kg)       General appearance:  Appears comfortable  Eyes: Sclera clear. Pupils equal.  ENT: Moist oral mucosa. Trachea midline, no adenopathy. Cardiovascular: Regular rhythm, normal S1, S2. No murmur. No edema in lower extremities  Respiratory: Not using accessory muscles. Good inspiratory effort. Clear to auscultation bilaterally, no wheeze or crackles. GI: Abdomen soft, no tenderness, not distended  Musculoskeletal: No cyanosis in digits, neck supple  Neurology: CN 2-12 grossly intact. No speech or motor deficits  Psych: Normal affect. Alert and oriented in time, place and person  Skin: Warm, dry, normal turgor    Labs and Tests:  CBC:   Recent Labs     05/29/19  1031 05/30/19  0906 05/31/19  0608   WBC 4.8 3.7* 5.3   HGB 10.7* 10.1* 9.0*    205 205     BMP:  Recent Labs     05/29/19  1031 05/30/19  0906 05/31/19  0609    142 142   K 3.5 3.4*  3.4* 4.2    106 107   CO2 21 23 24   BUN 11 6* 11   CREATININE 0.7* 0.7* 0.6*   GLUCOSE 110* 107* 125*     Hepatic: Recent Labs     05/29/19  1031 05/30/19  0906   AST 16 15   ALT 16 15   BILITOT <0.2 0.5   ALKPHOS 71 71       ASSESSMENT AND PLAN    Active Problems:    Nausea and vomiting  Resolved Problems:    * No resolved hospital problems. *      Nausea/vomiting  - ?  Gastroenteritis   - possibly d/t Zytiga, but he has been on Zytiga since July 2017 with no previous problems  - continue antiemetics PRN  - MRI brain to r/o mets  - discussed with GI physician assistant, will hold off on GI consult at this time, pt seems to be eating without problems currently     Metastatic prostate cancer  - on Zytiga, Lupron, Xgeva  - new lung noduies, bx on 5/21/19 result positive for malignancy, unclear etiology     Penile mass/pain  - was planning on having suprapubic catheter placed Julieta 3  - consult urology, he has seen Dr. Jp May  - will let rad onc know of admission   - will resume home pain medications  - order US guided biopsy of penile mass      Annemarie Villa, [facility]  [facility]    Patient was seen this morning. Persistent nausea. Some difficulty in voiding. Penile mass bigger. Biopsy of lung nodule undifferentiated carcinoma not quite consistent prostate cancer. Explained to the patient about the findings. Discussed with the radiologist.  Scheduled the biopsy of penile mass. Suprapubic catheter was recommended. The patient verbalized his understanding.       Damaris Martin MD, MS

## 2019-05-31 NOTE — PROGRESS NOTES
VSS; respirations even, easy, and unlabored. Shift assessment complete, pt A&OX4. Scheduled medications administered. Ate 100% of breakfast. IV flushed. Waiting for transport to [address]. Bed alarm engaged. No further needs expressed. Call light within reach. Will continue to monitor.

## 2019-05-31 NOTE — PROGRESS NOTES
Scheduled MS contin administered per orders. Fresh ice water provided. Bed alarm engaged. No further needs expressed. Call light within reach. Will continue to monitor.

## 2019-05-31 NOTE — PROGRESS NOTES
Consent verified for penile biopsy. Will start MRI checklist when patient returns from [address] biopsy.

## 2019-05-31 NOTE — PROGRESS NOTES
MAGNESIA) 400 MG/5ML suspension 30 mL  30 mL Oral Daily PRN Gio Lomas MD        ondansetron [facility]) injection 4 mg  4 mg Intravenous Q6H PRN Karoline Figueroa MD   4 mg at 05/30/19 0551    enoxaparin (LOVENOX) injection 40 mg  40 mg Subcutaneous Nightly Karoline Figueroa MD   40 mg at 05/30/19 2209    magnesium sulfate 1 g in dextrose 5% 100 mL IVPB  1 g Intravenous PRN Karoline Figueroa MD        potassium chloride (KLOR-CON M) extended release tablet 40 mEq  40 mEq Oral PRN Karoline Figueroa MD   40 mEq at 05/30/19 1640    Or    potassium bicarb-citric acid (EFFER-K) effervescent tablet 40 mEq  40 mEq Oral PRN Gio Lomas MD        Or    potassium chloride 10 mEq/100 mL IVPB (Peripheral Line)  10 mEq Intravenous PRN Karoline Figueroa MD        pantoprazole (PROTONIX) injection 40 mg  40 mg Intravenous Daily Karoline Figueroa MD   40 mg at 05/31/19 8478    [Held by provider] HYDROmorphone HCl PF (DILAUDID) injection 0.25 mg  0.25 mg Intravenous Q6H PRN Gio Lomas MD        Or   Tika Olea by provider] HYDROmorphone HCl PF (DILAUDID) injection 0.5 mg  0.5 mg Intravenous Q6H PRN Karoline Figueroa MD   0.5 mg at 05/30/19 0701    LORazepam (ATIVAN) injection 0.5 mg  0.5 mg Intravenous Q8H PRN Karoline Figueroa MD   0.5 mg at 05/30/19 1056    glycopyrrolate-formoterol (BEVESPI) 9-4.8 MCG/ACT inhaler 2 puff  2 puff Inhalation BID Karoline Figueroa MD   2 puff at 05/31/19 0825     Allergies   Allergen Reactions    Dicyclomine Other (See Comments)     Muscle spasms    Dye [Iodides] Swelling     ivp dye     Eliquis [Apixaban] Nausea Only     Side effect, not an allergy      Lactose Intolerance (Gi)     Pcn [Penicillins] Swelling     itch    Plavix  [Clopidogrel Bisulfate]     Plavix [Clopidogrel] Other (See Comments)     Flu like sx     Active Problems:    Nausea and vomiting  Resolved Problems:    * No resolved hospital problems.  *    Blood pressure (!) 153/81, pulse 81, temperature 97.6 °F (36.4 °C), temperature source Oral, resp. rate 16, height 5' 9\" (1.753 m), weight 150 lb (68 kg), SpO2 99 %. Subjective:  Symptoms:  Stable. Diet:  Adequate intake. Activity level: Impaired due to pain. Pain:  He complains of pain that is moderate. Objective:  General Appearance:  Ill-appearing. Vital signs: (most recent): Blood pressure (!) 153/81, pulse 81, temperature 97.6 °F (36.4 °C), temperature source Oral, resp. rate 16, height 5' 9\" (1.753 m), weight 150 lb (68 kg), SpO2 99 %. Output: Producing urine. HEENT: Normal HEENT exam.    Lungs:  Normal effort. Abdomen: Abdomen is soft. Neurological: Patient is alert and oriented to person, place and time. Assessment:    Condition: In stable condition.        Adv pca  Lung mets  Dysuria  Penile met with pain  Void diff    recc    For bx today  Sp tube Monday  Start xrt  Prognosis poor    Per Olguin MD  5/31/2019

## 2019-05-31 NOTE — PROGRESS NOTES
glycopyrrolate-formoterol (BEVESPI) 9-4.8 MCG/ACT inhaler 2 puff BID       Objective:  BP (!) 153/83   Pulse 62   Temp 97.8 °F (36.6 °C) (Oral)   Resp 16   Ht 5' 9\" (1.753 m)   Wt 150 lb (68 kg)   SpO2 96%   BMI 22.15 kg/m²     Intake/Output Summary (Last 24 hours) at 5/31/2019 0959  Last data filed at 5/31/2019 0315  Gross per 24 hour   Intake 965 ml   Output 750 ml   Net 215 ml      Wt Readings from Last 3 Encounters:   05/29/19 150 lb (68 kg)   05/21/19 146 lb (66.2 kg)   05/06/19 146 lb (66.2 kg)       General appearance:  Appears comfortable  Eyes: Sclera clear. Pupils equal.  ENT: Moist oral mucosa. Trachea midline, no adenopathy. Cardiovascular: Regular rhythm, normal S1, S2. No murmur. No edema in lower extremities  Respiratory: Not using accessory muscles. Good inspiratory effort. Clear to auscultation bilaterally, no wheeze or crackles. GI: Abdomen soft, no tenderness, not distended, normal bowel sounds  Musculoskeletal: No cyanosis in digits, neck supple  Neurology: CN 2-12 grossly intact. No speech or motor deficits  Psych: Normal affect. Alert and oriented in time, place and person  Skin: Warm, dry, normal turgor    Labs and Tests:  CBC:   Recent Labs     05/29/19  1031 05/30/19  0906 05/31/19  0608   WBC 4.8 3.7* 5.3   HGB 10.7* 10.1* 9.0*    205 205     BMP:    Recent Labs     05/29/19  1031 05/30/19  0906 05/31/19  0609    142 142   K 3.5 3.4*  3.4* 4.2    106 107   CO2 21 23 24   BUN 11 6* 11   CREATININE 0.7* 0.7* 0.6*   GLUCOSE 110* 107* 125*     Hepatic:   Recent Labs     05/29/19  1031 05/30/19  0906   AST 16 15   ALT 16 15   BILITOT <0.2 0.5   ALKPHOS 71 71     Problem List  Active Problems:    Nausea and vomiting  Resolved Problems:    * No resolved hospital problems.  *       Assessment & Plan:   Intractable nausea and vomiting associated with epigastric pain, unclear etiology,after discussing the issue with oncology, it seem that the pt is on Chemotherapy for long time, ? Gastroenteritis. No vomiting since he is in the hospital and he is tolerating oral diet   - stop IV fluid   - Phenergan when necessary  - general diet  - will lower dexamethasone graham to 2 mg  - Protonix IV  - pt is requesting his home oral pain medications, Oncology adjusted it      CAD status post CABG, continue with home medications.     Prostate cancer status post chemotherapy, we'll defer ordering chemotherapy to oncology team.  Oncology consult appreciated recommendations. - plan to biopsy penile mass to day  - MRI ordered by oncology to exclude any METs     Pulmonary nodules, status post CT-guided biopsy, followed by pulmonologist as an outpatient.     Hypertension, blood pressure initially at the ED was elevated, possibly in the setting of pain, no pressure was running on the low side, we'll carefully monitor his blood pressure in the setting of vomiting and not retaining his pills, has PRN  IV blood pressure medications.     Hyperlipidemia, continue on statin     ANN, CPAP during nighttime.     dispo is pending oncology recs      Diet: Diet NPO Effective Now  Code:Full Code  DVT PPX lovenox      Cecilio Tuttle MD   5/31/2019 9:59 AM

## 2019-06-01 NOTE — PROGRESS NOTES
05/31/19 2324   Oxygen Therapy/Pulse Ox   O2 Therapy Room air   O2 Device None (Room air)  (no cpap in room-pt has not been wearing at hs)   Resp 14   SpO2 91 %   Pulse Oximeter Device Mode Intermittent   Pulse Oximeter Device Location Left;Finger

## 2019-06-01 NOTE — PLAN OF CARE
Problem: Falls - Risk of:  Goal: Will remain free from falls  Description  Will remain free from falls  Outcome: Ongoing  Remains free of falls, but non-complaint to bed/chair alarm. Pt. Has gotten up multiple times without assistance. Explained to patient the importance of Call don't Fall. Problem: Falls - Risk of:  Goal: Absence of physical injury  Description  Absence of physical injury  Outcome: Ongoing     Problem: Pain:  Goal: Pain level will decrease  Description  Pain level will decrease  Outcome: Ongoing  Patient gets scheduled Ms contin and has Percocet PRN.

## 2019-06-01 NOTE — PROGRESS NOTES
[address] [facility]    Patient Demographics    Patient. Amparo Valadez  Date of Birth. 1950  MRN. [id number]     Primary care provider. Luz Maria Crespo MD  (Tel: [phone])    Admit date: 5/29/2019    Discharge date (blank if same as Note Date): 6/1/2019  Note Date: 6/11/2019     Reason for Hospitalization. Chief Complaint   Patient presents with    Emesis     patient in with complaints of nausea and vomiting. given 500mL bolus per the squad. in via [place]. history of prostate cancer. Significant Findings. Active Problems:    Nausea and vomiting  Resolved Problems:    * No resolved hospital problems. [facility]. 80years old male with a past medical history that includes peptic ulcer disease/hypertension/hyperlipidemia/depression/coronary artery disease/cancer, who presented to the emergency department with a complaint of intractable nausea and vomiting associated with epigastric pain, unclear etiology,after discussing the issue with oncology, it seem that the pt is on Chemotherapy for long time, possibly it was 2/2 Gastroenteritis.  No vomiting since he is in the hospital and he is tolerating oral diet   - Received IV fluid, IV fluid stopped now because the patient is tolerating oral diet  - Phenergan when necessary  - general diet  - We started tapering dexamethasone to 2 mg, plan to stop it upon discharge  - He received Protonix IV  - pt is requested his home oral pain medications, Oncology adjusted it      CAD status post CABG, continue with home medications.     Prostate cancer status post chemotherapy, we'll defer ordering chemotherapy to oncology team.    - plan to biopsy penile mass on Monday, discussed with Dr. Juli Slaughter, urologist, it seems that Dr. soto will be working on Monday and he will be doing the biopsy.  - MRI ordered by oncology to exclude any METs     Pulmonary nodules, status post CT-guided biopsy, followed by pulmonologist as an outpatient.     Hypertension, blood pressure initially at the ED was elevated, possibly in the setting of pain, now blood pressure was running on the low side, blood pressure medications were adjusted. Hyperlipidemia, continue on statin     ANN, CPAP during nighttime. Consults. IP CONSULT TO ONCOLOGY  IP CONSULT TO HOSPITALIST  IP CONSULT TO ONCOLOGY  IP CONSULT TO DIETITIAN  IP CONSULT TO SOCIAL WORK  IP CONSULT TO UROLOGY    Physical examination on discharge day. BP (!) 152/83   Pulse 85   Temp 97.7 °F (36.5 °C) (Oral)   Resp 16   Ht 5' 9\" (1.753 m)   Wt 150 lb (68 kg)   SpO2 96%   BMI 22.15 kg/m²   General appearance. Alert. Looks comfortable. HEENT. Sclera clear. Moist mucus membranes. Cardiovascular. Regular rate and rhythm, normal S1, S2. No murmur. Respiratory. Not using accessory muscles. Clear to auscultation bilaterally, no wheeze. Gastrointestinal. Abdomen soft, non-tender, not distended, normal bowel sounds  Neurology. Facial symmetry. No speech deficits. Moving all extremities equally. Extremities. No edema in lower extremities. Skin. Warm, dry, normal turgor    Condition at time of discharge stable    Medication instructions provided to patient at discharge. Medication List      CHANGE how you take these medications    albuterol (5 MG/ML) 0.5% nebulizer solution  Commonly known as:  PROVENTIL  Take 1 mL by nebulization 4 times daily as needed for Wheezing  What changed:  Another medication with the same name was removed. Continue taking this medication, and follow the directions you see here.         CONTINUE taking these medications    abiraterone acetate 250 MG tablet  Commonly known as:  ZYTIGA  Take 4 tablets by mouth daily     ALPRAZolam 1 MG tablet  Commonly known as:  XANAX     ARIPiprazole 10 MG tablet  Commonly known as:  ABILIFY  TAKE 1 TABLET BY MOUTH EVERY DAY minutes in discharge process. Signed:   Marco Jones MD     6/11/2019 2:27 PM

## 2019-06-01 NOTE — PROGRESS NOTES
Physical Therapy    Facility/Department: [address] ONCOLOGY  Initial Assessment/Discharge Summary    NAME: Kasandra Denver  : 1950  MRN: [id number]      This patient was seen on 19 for evaluation only, prior to discharge from the hospital to home. Please refer to the initial evaluation below for the functional status at discharge, as this will serve as the discharge note. Yuan Gill PT, [place] [id number]    Date of Service: 2019    Discharge Recommendations: Kasandra Denver scored a 21/24 on the AM-PAC short mobility form. Current research shows that an AM-PAC score of 18 or greater is typically associated with a discharge to the patient's home setting. Based on the patients AM-PAC score and their current functional mobility deficits, it is recommended that the patient have 2-3 sessions per week of Physical Therapy at d/c to increase the patients independence. HOME HEALTH CARE: LEVEL 1 STANDARD    - Initial home health evaluation to occur within 24-48 hours, in patient home   - Therapy to evaluate with goal of regaining prior level of functioning   - Therapy to evaluate if patient has [address] needs for personal care    Assessment   Body structures, Functions, Activity limitations: Decreased functional mobility ; Decreased endurance;Decreased balance;Decreased high-level IADLs  Assessment: Patient presents with the deficits noted above related to the diagnosis of hypoxia. Patient is currently not functioning at his highest level of independence. Treatment Diagnosis: decreased activity tolerance, decreased strength, impaired balance  Prognosis: Fair  Decision Making: Low Complexity  History: s/p CABG, current CA, HTN, COPD, CAD  Exam: eval, MMT, transfers, gait, 6 clicks  Clinical Presentation: evolving  Patient Education: Patient is educated on role of acute care PT and discharge recommendations.   REQUIRES PT FOLLOW UP: Yes  Activity Tolerance  Activity Tolerance: Patient Tolerated treatment therapy evaluation.   Pain Screening  Patient Currently in Pain: Denies          Orientation  Orientation  Overall Orientation Status: Within Normal Limits  Social/Functional History  Social/Functional History  Lives With: Spouse  Type of Home: House  Home Layout: Two level, Able to Live on Main level with bedroom/bathroom(laundromat used, has bedroom on second floor however recently moved to first floor for easy access )  Home Access: Stairs to enter without rails  Entrance Stairs - Number of Steps: 1+1  Bathroom Shower/Tub: Tub/Shower unit  Bathroom Toilet: Standard  Bathroom Equipment: Grab bars in shower, Shower chair  Home Equipment: Cane, Rolling walker(RW upstairs and RW downstairs )  Receives Help From: Family  ADL Assistance: Independent  Homemaking Assistance: Needs assistance(wife does majority of cooking, pt does assist when able)  Homemaking Responsibilities: Yes  Ambulation Assistance: Independent  Transfer Assistance: Independent  Active : Yes  Leisure & Hobbies: Paint  Additional Comments: Spouse is primary caregiver     Objective     Observation/Palpation  Posture: Fair    AROM RLE (degrees)  RLE AROM: WNL  AROM LLE (degrees)  LLE AROM : WNL  Strength RLE  Strength RLE: WFL  Comment: Grossly 5/5  Strength LLE  Strength LLE: WFL  Comment: Grossly 4+/5  Tone RLE  RLE Tone: Normotonic  Motor Control  Gross Motor?: WNL  Sensation  Overall Sensation Status: WFL  Bed mobility  Supine to Sit: Independent  Sit to Supine: Independent  Scooting: Independent  Transfers  Sit to Stand: Supervision  Stand to sit: Supervision  Ambulation  Ambulation?: Yes  WB Status: WBAT  Ambulation 1  Surface: level tile  Device: No Device  Assistance: Stand by assistance  Gait Deviations: Slow Susanna  Distance: 300 feet  Comments: Patient reports feeling weaker than usual     Balance  Posture: Fair  Sitting - Static: Good  Sitting - Dynamic: Good  Standing - Static: Fair  Standing - Dynamic: [address]  Times per week: 3-5  Times per day: Daily  Current Treatment Recommendations: Strengthening, Balance Training, Endurance Training, Gait Training, Home Exercise Program, Safety Education & Training, Stair training, Functional Mobility Training  Safety Devices  Type of devices: All fall risk precautions in place, Bed alarm in place, Call light within reach, Left in bed, Patient at risk for falls, Gait belt, Nurse notified(Aylin)  Restraints  Initially in place: No      AM-PAC Score  AM-PAC Inpatient Mobility Raw Score : 21  AM-PAC Inpatient T-Scale Score : 50.25  Mobility Inpatient CMS 0-100% Score: 28.97  Mobility Inpatient CMS G-Code Modifier : CJ          Goals  Short term goals  Time Frame for Short term goals: At time of discharge  Short term goal 1: Patient will be able to complete all functional transfers with [place]. Short term goal 2: Patient will be able to ambulate > 350 feet with LRAD and SUP. Short term goal 3: Patient will be able to participate in 10 minutes of unsupported standing activity without LOB. Patient Goals   Patient goals :  To return to home and get stronger       Therapy Time   Individual Concurrent Group Co-treatment   Time In 1410         Time Out 1436         Minutes 26         Timed Code Treatment Minutes: 11 Minutes     Total Minutes: Gita Estrada

## 2019-06-01 NOTE — PROGRESS NOTES
- Number of Steps: 1+1  Bathroom Shower/Tub: Tub/Shower unit  Bathroom Toilet: Standard  Bathroom Equipment: Grab bars in shower, Shower chair  Home Equipment: Cane, Rolling walker(RW upstairs and RW downstairs )  Receives Help From: Family  ADL Assistance: Independent  Homemaking Assistance: Needs assistance(wife does majority of cooking, pt does assist when able)  Homemaking Responsibilities: Yes  Ambulation Assistance: Independent  Transfer Assistance: Independent  Active : Yes  Leisure & Hobbies: Paint  Additional Comments: Spouse is primary caregiver        Objective   Vision: Impaired  Vision Exceptions: Wears glasses for reading  Hearing: Exceptions to [facility]  Hearing Exceptions: Right hearing aid;Left hearing aid    Orientation  Overall Orientation Status: Within Normal Limits  Observation/Palpation  Posture: Fair  Balance  Sitting Balance: Modified independent   Standing Balance: Supervision  Functional Mobility  Functional - Mobility Device: No device  Activity: To/from bathroom  Assist Level: Supervision  Toilet Transfers  Toilet - Technique: Ambulating  Toilet Transfer: Supervision  ADL  Grooming: Supervision(to brush teeth at sink )  LE Dressing: Supervision(to don/doff B socks while supine )  Toileting: Supervision  Tone RUE  RUE Tone: Normotonic  Tone LUE  LUE Tone: Normotonic  Coordination  Movements Are Fluid And Coordinated: Yes     Bed mobility  Supine to Sit: Modified independent  Sit to Supine: Modified independent  Comment: completed with HOB elevated and use of bed rail   Transfers  Stand Step Transfers: Supervision  Sit to stand: Supervision  Stand to sit: Supervision  Vision - Basic Assessment  Prior Vision: Wears glasses all the time  Visual History: No significant visual history  Patient Visual Report: No visual complaint reported.   Cognition  Overall Cognitive Status: WNL  Perception  Overall Perceptual Status: WFL     Sensation  Overall Sensation Status: WFL(denies numbness/tingling

## 2019-06-01 NOTE — PROGRESS NOTES
05/30/19 0551    enoxaparin (LOVENOX) injection 40 mg  40 mg Subcutaneous Nightly Gio Magaña MD   40 mg at 05/31/19 2100    magnesium sulfate 1 g in dextrose 5% 100 mL IVPB  1 g Intravenous PRN Marlo Pereira MD        potassium chloride (KLOR-CON M) extended release tablet 40 mEq  40 mEq Oral PRN Marlo Pereira MD   40 mEq at 05/30/19 1640    Or    potassium bicarb-citric acid (EFFER-K) effervescent tablet 40 mEq  40 mEq Oral PRN Gio Magaña MD        Or    potassium chloride 10 mEq/100 mL IVPB (Peripheral Line)  10 mEq Intravenous PRN Marlo Pereira  mL/hr at 06/01/19 0919 10 mEq at 06/01/19 0919    pantoprazole (PROTONIX) injection 40 mg  40 mg Intravenous Daily Marlo Pereira MD   40 mg at 06/01/19 0641    [Held by provider] HYDROmorphone HCl PF (DILAUDID) injection 0.25 mg  0.25 mg Intravenous Q6H PRN Gio Magaña MD        Or   Sergio Bronson by provider] HYDROmorphone HCl PF (DILAUDID) injection 0.5 mg  0.5 mg Intravenous Q6H PRN Marlo Pereira MD   0.5 mg at 05/30/19 0701    LORazepam (ATIVAN) injection 0.5 mg  0.5 mg Intravenous Q8H PRN Marlo Pereira MD   0.5 mg at 05/30/19 1056    glycopyrrolate-formoterol (BEVESPI) 9-4.8 MCG/ACT inhaler 2 puff  2 puff Inhalation BID Marlo Pereira MD   2 puff at 06/01/19 0745     Allergies   Allergen Reactions    Dicyclomine Other (See Comments)     Muscle spasms    Dye [Iodides] Swelling     ivp dye     Eliquis [Apixaban] Nausea Only     Side effect, not an allergy      Lactose Intolerance (Gi)     Pcn [Penicillins] Swelling     itch    Plavix  [Clopidogrel Bisulfate]     Plavix [Clopidogrel] Other (See Comments)     Flu like sx     Active Problems:    Nausea and vomiting  Resolved Problems:    * No resolved hospital problems. *    Blood pressure 120/71, pulse 69, temperature 97.8 °F (36.6 °C), temperature source Oral, resp. rate 18, height 5' 9\" (1.753 m), weight 150 lb (68 kg), SpO2 97 %. Subjective:  Symptoms:  Stable. Diet:  Adequate intake. Activity level: Normal with assistance. Pain:  He complains of pain that is mild. Objective:  General Appearance:  Comfortable. Vital signs: (most recent): Blood pressure 120/71, pulse 69, temperature 97.8 °F (36.6 °C), temperature source Oral, resp. rate 18, height 5' 9\" (1.753 m), weight 150 lb (68 kg), SpO2 97 %. Output: Producing urine and producing stool. HEENT: Normal HEENT exam.    Abdomen: Abdomen is soft. Neurological: Patient is alert. Skin:  Warm. Assessment:    Condition: In stable condition.        Adv met prostate ca  prob met to corpora, s/p bx  Void diff  Anemia    recc    Tolerated bx well, no hematoma  Voids ok  sched for sp tube Monday  xrt after that    Kiran De La O MD  6/1/2019

## 2019-06-01 NOTE — PLAN OF CARE
Problem: Falls - Risk of:  Goal: Will remain free from falls  Description  Will remain free from falls  6/1/2019 0740 by Xuan Dobbs RN  Outcome: Ongoing  6/1/2019 0320 by Sarah Ambriz RN  Outcome: Ongoing  Goal: Absence of physical injury  Description  Absence of physical injury  6/1/2019 0740 by Xuan Dobbs RN  Outcome: Ongoing  6/1/2019 0320 by Sarah Ambriz RN  Outcome: Ongoing     Problem: Pain:  Goal: Pain level will decrease  Description  Pain level will decrease  6/1/2019 0740 by Xuan Dobbs RN  Outcome: Ongoing  6/1/2019 0320 by Sarah Ambriz RN  Outcome: Ongoing  Goal: Control of acute pain  Description  Control of acute pain  6/1/2019 0740 by Xuan Dobbs RN  Outcome: Ongoing  6/1/2019 0320 by Sarah Ambriz RN  Outcome: Ongoing  Goal: Control of chronic pain  Description  Control of chronic pain  6/1/2019 0740 by Xuan Dobbs RN  Outcome: Ongoing  6/1/2019 0320 by Sarah Ambriz RN  Outcome: Ongoing     Problem: Nutrition  Goal: Optimal nutrition therapy  6/1/2019 0740 by Xuan Dobbs RN  Outcome: Ongoing  6/1/2019 0320 by Sarah Ambriz RN  Outcome: Ongoing     Problem:  Activity:  Goal: Ability to perform activities at highest level will improve  Description  Ability to perform activities at highest level will improve  6/1/2019 0740 by Xuan Dobbs RN  Outcome: Ongoing  6/1/2019 0320 by Sarah Ambriz RN  Outcome: Ongoing

## 2019-06-01 NOTE — FLOWSHEET NOTE
Assessment complete. VSS. Patient resting in bed. Respirations even and easy. Call light in reach. No needs expressed at this time. Will continue to monitor.         05/31/19 2045   Vital Signs   Temp 98 °F (36.7 °C)   Temp Source Oral   Pulse 71   Heart Rate Source Monitor   Resp 16   BP (!) 158/88   BP Location Right upper arm   Patient Position Semi fowlers   Level of Consciousness 0   MEWS Score 1   Patient Currently in Pain Yes   Pain Assessment   Pain Assessment 0-10   Pain Level 2   Oxygen Therapy   SpO2 96 %   Pulse Oximeter Device Mode Intermittent   Pulse Oximeter Device Location Finger   O2 Device None (Room air)

## 2019-06-01 NOTE — PROGRESS NOTES
Spoke with Patient and patients wife. Both are willing to come to [facility] early in the morning for procedure since time is unknown. Patient insisting on going home, even if surgery will have to be rescheduled. MD aware. Patient educated on  Holding aspirin until after procedure and being NPO at midnight Sunday night. Belongings packed. Discharge instructions reviewed with patient. Wife on way to  patient.

## 2019-06-01 NOTE — PROGRESS NOTES
Per , patients procedure to have suprapubic cath placed is Monday morning with  at [facility]. Time unknown. Attempted to call OR and no answer. Primary MD aware.

## 2019-06-01 NOTE — PROGRESS NOTES
Shift assessment completed. VSS. Denies pain. C/o anxiety, prn xanax given with scheduled medications. K+ 3.0, iv replacement started at this time. Wife at bedside. Denies additional needs. Bed alarm engaged, call light in reach. Will monitor.

## 2019-06-03 NOTE — H&P
The history and physical exam was reviewed. The patient was seen and examined in pre-op. He had a chance to ask questions which were answered. There has been no interval changes. Plan to continue to the OR for SPT placement.     Marquis Nam  6/3/2019

## 2019-06-03 NOTE — PROGRESS NOTES
VSS, Dr. Hola Rinaldi notified of pt's elevated BP in PACU, no new orders. Phase 1 discharge criteria met--seen by anesthesia.

## 2019-06-03 NOTE — OP NOTE
Urology Operative Report  [facility]    Provider: Stefany Catalan MD Patient ID:  Admission Date: 6/3/2019 Name: Ebony Oh  OR Date: 6/3/2019  MRN: [id number]   Patient Location: OR/NONE : 1950  Attending: Stefany Catalan MD Date of Service: 6/3/2019  PCP: Lesly Thomas MD     Date of Operation: 6/3/2019    Preoperative Diagnosis:   1. difficulty emptying  2. penile mass    Postoperative Diagnosis: same    Procedure:    1. Cystoscopy  2. Percutaneous SP tube placement    Surgeon:   Stefany Catalan MD    Anesthesia: Monitored Local Anesthesia with Sedation    Indications: Ebony Oh is a 80 y.o. male who presents for the above named surgery. Informed consent was obtained and the risks, benefits, and details of the procedure were explained to the patient who elected to proceed. Details of Procedure: The patient was brought to the operating room and placed in the supine position on the operating room table. SCDs were placed on the lower extremities. Following induction of anesthesia the patient was positioned in a lithotomy position, all pressure points were padded, and the genitals and abdomen were prepped and draped in the usual sterile fashion. A routine timeout was performed, confirming the patient, procedure, site, risk of fire, patient allergies and confirming that preoperative antibiotics had been administered prior to beginning. A 21 fr rigid cystoscope was advance via a normal distal urethra. The right penile mass was palpated and we did see some extrinsic compression to the urethra. The urethra and bladder were inspected. The 30 degree lens was left facing anteriorly so the SP tube placement could be monitored visually. The abdomen was palpated in the midline. There were no incisions in the location. Using the Markt 85, a spinal needle was used to identify an acceptable area of entry, and angle of percutaneous access. A 1 cm incision was made.   The percutaneous  SP tube trocar was placed under vision directly into the bladder. An 18 Fr rich was placed easily through the sheath and the outer sheath was peeled away. The catheter was inflated with 10mL of water. The bladder was allowed to drain and the cystoscope was removed. The tube was seen to be in the appropriate location, and was secures with a silk stitch    At the end of the procedure all counts were correct. The patient tolerated the procedure well and was transported to the PACU in stable condition. Findings: successful SP tube placement     Estimated Blood Loss: <3mL                  Drains: 18 fr Rich SP tube. Specimens: none    Complications: none apparent           Disposition:  PACU - hemodynamically stable. Plan: Follow up in 1 months for irch change.  Plan on interval RT of penile mass after biopsy results           Roland Keller MD  6/3/2019

## 2019-06-03 NOTE — PROGRESS NOTES
Discussed d/c instructions with pt and friend/family at bedside. Verbalized understanding. Provided pt/family with written instructions. Suprapubic cathter care discussed; provided with a cork and a rich drainage bag. Pt chooses to go home with rich bag currently attached. Pt and wife demonstrated how to plug, unplug, drain, attach bag, drain bag.     Mckenna Hilton BSN, RN, [facility]  Pre-Op/Recovery   Same Day Surgery

## 2019-06-03 NOTE — PROGRESS NOTES
Pt arrived to me from PACU to a same day surgery bay for phase 2 recovery monitoring and care prior to d/c in good condition. Pt is alert and oriented X4. Report received from Jazmin Young, phase 1 recovery nurse.     S/p suprapubic catheter placement surgery      Irene CHAVEZN, RN, [facility]  Pre-Op/Recovery   Same Day Surgery

## 2019-06-03 NOTE — ANESTHESIA PRE PROCEDURE
Department of Anesthesiology  Preprocedure Note       Name:  Ellis Briceno   Age:  80 y.o.  :  1950                                          MRN:  [id number]         Date:  6/3/2019      Surgeon: Hilario Oleary):  Vanessa Drew MD    Procedure: SUPRAPUBIC  TUBE PLACEMENT (N/A )    Medications prior to admission:   Prior to Admission medications    Medication Sig Start Date End Date Taking? Authorizing Provider   ALPRAZolam Connie Aaron) 1 MG tablet Take 1 mg by mouth 3 times daily as needed for Sleep. Historical Provider, MD   morphine (MS CONTIN) 30 MG extended release tablet Take 30 mg by mouth 3 times daily. Historical Provider, MD   predniSONE (DELTASONE) 5 MG tablet Take 5 mg by mouth 2 times daily    Historical Provider, MD   oxyCODONE-acetaminophen (PERCOCET)  MG per tablet Take 1 tablet by mouth every 4-6 hours as needed for Pain. Historical Provider, MD   pantoprazole (PROTONIX) 40 MG tablet Take 40 mg by mouth daily    Historical Provider, MD   metoprolol tartrate (LOPRESSOR) 25 MG tablet TAKE 1 TABLET BY MOUTH TWICE A DAY 3/7/19   Herson Carbajal APRN - CNP   venlafaxine (EFFEXOR XR) 150 MG extended release capsule TAKE 2 CAPSULES BY MOUTH EVERY DAY 19   Margi Benson APRN - CNP   ARIPiprazole (ABILIFY) 10 MG tablet TAKE 1 TABLET BY MOUTH EVERY DAY 19   Steffi Perez APRN - CNP   albuterol (PROVENTIL) (5 MG/ML) 0.5% nebulizer solution Take 1 mL by nebulization 4 times daily as needed for Wheezing 18   Abdullahi Clay MD   Blood Pressure Monitoring (BLOOD PRESSURE MONITOR/S CUFF) MISC 1 Device by Does not apply route daily 18   Elsa Liu APRN - CNP   hydrocortisone 2.5 % cream Apply topically every other day Apply topically 2 times daily.     Historical Provider, MD   umeclidinium-vilanterol (ANORO ELLIPTA) 62.5-25 MCG/INH AEPB inhaler Inhale 1 puff into the lungs daily 12/10/18   Sharda García MD   atorvastatin (LIPITOR) 20 MG tablet TAKE ONE TABLET BY MOUTH ONCE NIGHTLY 11/2/18   Aldo Nava MD   ondansetron (ZOFRAN ODT) 4 MG disintegrating tablet Take 1-2 tablets by mouth every 8 hours as needed for Nausea May substitute the non ODT tablets if not covered financially by the insurance plan. 9/10/18   Shiela James MD   abiraterone acetate (ZYTIGA) 250 MG tablet Take 4 tablets by mouth daily 8/2/17   Darvin Gregg MD   aspirin 81 MG tablet Take 1 tablet by mouth daily 7/7/17   Gina Carrillo, APRN - CNP   potassium chloride (KLOR-CON) 10 MEQ extended release tablet Take 2 tablets by mouth daily 6/9/17   Yuvonne Mode, APRN - CNP   tamsulosin [facility]) 0.4 MG capsule Take 1 capsule by mouth daily 3/21/17   [facility] Mode, APRN - CNP   prochlorperazine (COMPAZINE) 5 MG tablet Take 1 tablet by mouth every 6 hours as needed for Nausea 3/21/17   Yuvonne Mode, APRN - CNP       Current medications:    No current facility-administered medications for this visit. No current outpatient medications on file. Facility-Administered Medications Ordered in Other Visits   Medication Dose Route Frequency Provider Last Rate Last Dose    0.9 % sodium chloride infusion   Intravenous Continuous Silverio Hernandez MD           Allergies:     Allergies   Allergen Reactions    Dicyclomine Other (See Comments)     Muscle spasms    Dye [Iodides] Swelling     ivp dye     Eliquis [Apixaban] Nausea Only     Side effect, not an allergy      Lactose Intolerance (Gi)     Pcn [Penicillins] Swelling     itch    Plavix  [Clopidogrel Bisulfate]     Plavix [Clopidogrel] Other (See Comments)     Flu like sx       Problem List:    Patient Active Problem List   Diagnosis Code    Hyperlipidemia E78.5    Severe recurrent major depression without psychotic features (HCC) F33.2    Alcohol dependence (HCC) F10.20    Hypertension I10    TIA (transient ischemic attack) G45.9    Hemispheric carotid artery syndrome G45.1    Patent foramen ovale Q21.1    Arachnoid cyst G93.0    NSTEMI (non-ST elevated myocardial infarction) ([facility] I21.4    Unstable angina ([facility]) I20.0    Coronary artery disease involving native coronary artery of native heart with angina pectoris ([facility]) I25.119    PFO (patent foramen ovale) Q21.1    S/P CABG x 3 Z95.1    Left hip pain M25.552    Left leg weakness R29.898    Hydronephrosis of right kidney N13.30    Abnormal weight loss R63.4    Lytic bone lesions on xray M89.9    Bone pain M89.8X9    Prostate cancer ([facility]) C61    Neoplasm related pain G89.3    Anorexia R63.0    Drug-induced constipation K59.03    Prostate cancer metastatic to bone ([facility]) C61, C79.51    Left facial numbness R20.0    Bone metastasis ([facility]) C79.51    Weakness R53.1    Bilateral leg edema R60.0    Diarrhea R19.7    Irritable bowel syndrome with diarrhea K58.0    Prolonged Q-T interval on ECG R94.31    Severe major depression ([facility]) F32.2    Subjective tinnitus of both ears H93.13    Hearing loss of both ears H91.93    Anxiety F41.9    Bipolar 1 disorder ([facility]) F31.9    9/21/17 LEFT femur IM nail 2/2 metastatic prostate cancer C79.51    Fracture T14. 8XXA    Pulmonary emphysema ([facility] [address] [facility] J43.9    Primary central sleep apnea G47.31    Nausea and vomiting R11.2       Past Medical History:        Diagnosis Date    ADHD (attention deficit hyperactivity disorder)     Bipolar 1 disorder ([facility] [address] [facility]     CAD (coronary artery disease) 2016    Cancer ([facility] [address] [facility]     BONE    Cerebrovascular disease 2016    5 TIAs in 3 weeks - R side weakness, numbness R face    COPD (chronic obstructive pulmonary disease) ([facility])     Depression     Hard of hearing     left ear     Hyperlipidemia     Hypertension     Lactose intolerance     OCD (obsessive compulsive disorder)     PFO (patent foramen ovale) 2016    Prolonged Q-T interval on ECG 06/2017    Prostate cancer ([facility] [address] [facility]     PUD (peptic ulcer disease) 6/1980    GI bleed (\"lost half blood volume\"). no surgery.     Sleep apnea     severe--uses CPAP    Suicide attempt [facility]) July, 2014    [facility] hospitalization    Wound infection July 17, 2014    L thigh. [facility] hospitalization       Past Surgical History:        Procedure Laterality Date    CARDIAC SURGERY      COLONOSCOPY  06/05/2017    CORONARY ARTERY BYPASS GRAFT  5/23/16    cabgx3, PFO closure Sarika Saunders)    FEMUR FRACTURE SURGERY Left 09/21/2017    Prophylactic IM nail left femur for impending fracture    LUMBAR LAMINECTOMY  1995    TONSILLECTOMY         Social History:    Social History     Tobacco Use    Smoking status: Never Smoker    Smokeless tobacco: Current User     Types: Snuff    Tobacco comment: uses very little snuff   Substance Use Topics    Alcohol use: No     Comment: pt states he quit in Feb.                                Ready to quit: Not Answered  Counseling given: Not Answered  Comment: uses very little snuff      Vital Signs (Current): There were no vitals filed for this visit.                                            BP Readings from Last 3 Encounters:   06/01/19 (!) 152/83   05/21/19 136/71   05/06/19 (!) 147/86       NPO Status:                                                                                 BMI:   Wt Readings from Last 3 Encounters:   05/29/19 150 lb (68 kg)   05/21/19 146 lb (66.2 kg)   05/06/19 146 lb (66.2 kg)     There is no height or weight on file to calculate BMI.    CBC:   Lab Results   Component Value Date    WBC 4.3 06/01/2019    RBC 2.68 06/01/2019    RBC 3.47 08/16/2017    HGB 8.9 06/01/2019    HCT 26.4 06/01/2019    MCV 98.7 06/01/2019    RDW 14.2 06/01/2019     06/01/2019       CMP:   Lab Results   Component Value Date     06/01/2019    K 4.5 06/01/2019    K 3.4 05/30/2019     06/01/2019    CO2 24 06/01/2019    BUN 11 06/01/2019    CREATININE 0.7 06/01/2019    GFRAA >60 06/01/2019    AGRATIO 1.8 05/30/2019    LABGLOM >60 06/01/2019    GLUCOSE 154 06/01/2019    GLUCOSE 105 08/16/2017    PROT 5.9 05/30/2019    PROT 6.2 08/16/2017    CALCIUM 9.1 06/01/2019    BILITOT 0.5 05/30/2019    ALKPHOS 71 05/30/2019    AST 15 05/30/2019    ALT 15 05/30/2019       POC Tests: No results for input(s): POCGLU, POCNA, POCK, POCCL, POCBUN, POCHEMO, POCHCT in the last 72 hours. Coags:   Lab Results   Component Value Date    PROTIME 10.1 05/21/2019    INR 0.89 05/21/2019    APTT 33.4 05/21/2019       HCG (If Applicable): No results found for: PREGTESTUR, PREGSERUM, HCG, HCGQUANT     ABGs:   Lab Results   Component Value Date    PHART 7.309 05/23/2016    PO2ART 364 05/23/2016    TYE8KVV 39 05/23/2016    JBU8UTK 19.7 05/23/2016    BEART -7 05/23/2016    V4QBFUVF 100 05/23/2016        Type & Screen (If Applicable):  No results found for: LABABO, LABRH    Anesthesia Evaluation   no history of anesthetic complications:   Airway: Mallampati: III  TM distance: >3 FB   Neck ROM: full  Mouth opening: > = 3 FB Dental:          Pulmonary:   (+) COPD:  sleep apnea:                             Cardiovascular:  Exercise tolerance: good (>4 METS),   (+) hypertension:, angina:, past MI:, CAD:, CABG/stent:,         Rhythm: regular  Rate: normal                    Neuro/Psych:   (+) TIA, psychiatric history:            GI/Hepatic/Renal:   (+) PUD,           Endo/Other:                     Abdominal:           Vascular:                                        Anesthesia Plan      general     ASA 3       Induction: intravenous. Anesthetic plan and risks discussed with patient. Use of blood products discussed with patient whom. Plan discussed with CRNA.               Favian Shelton MD   6/3/2019

## 2019-06-03 NOTE — PROGRESS NOTES
Pt to PACU in cart and in semi fowlers position. Resp adeq on 4L o2 NC, spo2 96%, VSS. Pt c/o feeling cold, warm blankets placed for comfort. Surgical dressing to abdomen CDI, abdomen soft. Suprapubic catheter in place and draining to gravity, yellow urine noted in tubing. No s/s distress noted. Will continue to monitor.

## 2019-06-08 NOTE — ED PROVIDER NOTES
[facility]        Pt Name: Monika Monk  MRN: [id number]  [place] 1950  Date of evaluation: 6/8/2019  Provider: Janeth Gonzalez PA-C  PCP: Dilia Noyola MD    This patient was seen and evaluated by the attending physician Dr. Dawson Balloon   Patient presents with    Shortness of Breath     Pt. comes in by [facility] from home. Pt. has been short of breath since this morning and has gradually gotten worse. Pt. has dry cough. Pt. has hx of prostate cancer with metes to lungs. HISTORY OF PRESENT ILLNESS   (Location/Symptom, Timing/Onset, Context/Setting, Quality, Duration, Modifying Factors, Severity)  Note limiting factors. Monika Monk is a 80 y.o. male with history of bipolar disorder, prostate cancer with metastases, lung mass, coronary disease who presents to the emergency department complaining of cough, congestion and shortness of breath for several days. Denies chest pain, palpitations, hemoptysis, abdominal pain or distention, nausea, vomiting, diarrhea, pain or swelling in extremities, unexpected weight gain or weight loss. He does have a frequent cough during history without stridor, tripoding or drooling. He feels like his throat is tight, making it difficult to breath. Nursing Notes were all reviewed and agreed with or any disagreements were addressed  in the HPI. REVIEW OF SYSTEMS    (2-9 systems for level 4, 10 or more for level 5)     Review of Systems   Constitutional: Positive for fatigue. Negative for chills and fever. HENT: Positive for congestion. Negative for ear discharge, ear pain, sneezing, tinnitus and trouble swallowing. Sore throat: tight. Voice change: raspy. Eyes: Negative for visual disturbance. Respiratory: Positive for cough, shortness of breath and wheezing. Negative for stridor.     Cardiovascular: Negative for chest pain, FRACTURE SURGERY Left 09/21/2017    Prophylactic IM nail left femur for impending fracture    LUMBAR LAMINECTOMY  1995    TONSILLECTOMY           CURRENTMEDICATIONS       Previous Medications    ABIRATERONE ACETATE (ZYTIGA) 250 MG TABLET    Take 4 tablets by mouth daily    ALBUTEROL (PROVENTIL) (5 MG/ML) 0.5% NEBULIZER SOLUTION    Take 1 mL by nebulization 4 times daily as needed for Wheezing    ALPRAZOLAM (XANAX) 1 MG TABLET    Take 1 mg by mouth 3 times daily as needed for Sleep. ARIPIPRAZOLE (ABILIFY) 10 MG TABLET    TAKE 1 TABLET BY MOUTH EVERY DAY    ASPIRIN 81 MG TABLET    Take 1 tablet by mouth daily    ATORVASTATIN (LIPITOR) 20 MG TABLET    TAKE ONE TABLET BY MOUTH ONCE NIGHTLY    BLOOD PRESSURE MONITORING (BLOOD PRESSURE MONITOR/S CUFF) MISC    1 Device by Does not apply route daily    HYDROCORTISONE 2.5 % CREAM    Apply topically every other day Apply topically 2 times daily. METOPROLOL TARTRATE (LOPRESSOR) 25 MG TABLET    TAKE 1 TABLET BY MOUTH TWICE A DAY    MORPHINE (MS CONTIN) 30 MG EXTENDED RELEASE TABLET    Take 30 mg by mouth 3 times daily. NALOXONE 4 MG/0.1ML LIQD NASAL SPRAY    1 spray by Nasal route as needed for Opioid Reversal    ONDANSETRON (ZOFRAN ODT) 4 MG DISINTEGRATING TABLET    Take 1-2 tablets by mouth every 8 hours as needed for Nausea May substitute the non ODT tablets if not covered financially by the insurance plan. OXYCODONE-ACETAMINOPHEN (PERCOCET)  MG PER TABLET    Take 1 tablet by mouth every 4-6 hours as needed for Pain. OXYCODONE-ACETAMINOPHEN (PERCOCET) 5-325 MG PER TABLET    Take 1-2 tablets by mouth every 6 hours as needed for Pain for up to 5 days.     PANTOPRAZOLE (PROTONIX) 40 MG TABLET    Take 40 mg by mouth daily    POTASSIUM CHLORIDE (KLOR-CON) 10 MEQ EXTENDED RELEASE TABLET    Take 2 tablets by mouth daily    PREDNISONE (DELTASONE) 5 MG TABLET    Take 5 mg by mouth 2 times daily    PROCHLORPERAZINE (COMPAZINE) 5 MG TABLET    Take 1 tablet by mouth every 6 hours as needed for Nausea    TAMSULOSIN (FLOMAX) 0.4 MG CAPSULE    Take 1 capsule by mouth daily    UMECLIDINIUM-VILANTEROL (ANORO ELLIPTA) 62.5-25 MCG/INH AEPB INHALER    Inhale 1 puff into the lungs daily    VENLAFAXINE (EFFEXOR XR) 150 MG EXTENDED RELEASE CAPSULE    TAKE 2 CAPSULES BY MOUTH EVERY DAY         ALLERGIES     Dicyclomine; Dye [iodides]; Eliquis [apixaban]; Lactose intolerance (gi); Pcn [penicillins]; Plavix  [clopidogrel bisulfate]; and Plavix [clopidogrel]    FAMILYHISTORY       Family History   Problem Relation Age of Onset    Hypertension Father 68        , Parkinson's, HTN.  Parkinsonism Father    Missy Fofana Other Mother 80        Alive - hip replacement.            SOCIAL HISTORY       Social History     Socioeconomic History    Marital status:      Spouse name: None    Number of children: 2    Years of education: None    Highest education level: None   Occupational History    None   Social Needs    Financial resource strain: None    Food insecurity:     Worry: None     Inability: None    Transportation needs:     Medical: None     Non-medical: None   Tobacco Use    Smoking status: Never Smoker    Smokeless tobacco: Current User     Types: Snuff    Tobacco comment: uses very little snuff   Substance and Sexual Activity    Alcohol use: No     Comment: pt states he quit in Feb.    Drug use: No     Comment: marijuana in the 1960s    Sexual activity: Never   Lifestyle    Physical activity:     Days per week: None     Minutes per session: None    Stress: None   Relationships    Social connections:     Talks on phone: None     Gets together: None     Attends [employer] service: None     Active member of club or organization: None     Attends meetings of clubs or organizations: None     Relationship status: None    Intimate partner violence:     Fear of current or ex partner: None     Emotionally abused: None     Physically abused: None     Forced sexual activity: None   Other Topics Concern    None   Social History Narrative    Living Will: No.               SCREENINGS             PHYSICAL EXAM    (up to 7 for level 4, 8 or more for level 5)     ED Triage Vitals   BP Temp Temp src Pulse Resp SpO2 Height Weight   -- -- -- -- -- -- -- --       Physical Exam   Constitutional: He is oriented to person, place, and time. He appears well-developed and well-nourished. No distress. HENT:   Head: Normocephalic and atraumatic. Right Ear: External ear normal.   Left Ear: External ear normal.   Nose: Nose normal.   Mouth/Throat: Uvula is midline, oropharynx is clear and moist and mucous membranes are normal. No oral lesions. No trismus in the jaw. No dental abscesses or uvula swelling. Tonsils are 1+ on the right. Tonsils are 1+ on the left. No tonsillar exudate. Eyes: Pupils are equal, round, and reactive to light. Conjunctivae and EOM are normal. Right eye exhibits no discharge. Left eye exhibits no discharge. No scleral icterus. Neck: Trachea normal, normal range of motion, full passive range of motion without pain and phonation normal. Neck supple. No JVD present. No tracheal tenderness present. No tracheal deviation present. No Brudzinski's sign and no Kernig's sign noted. No thyroid mass and no thyromegaly present. Cardiovascular: Normal rate. Pulmonary/Chest: Effort normal. No stridor. No respiratory distress. He has wheezes (bibasilar). Bronchospasm with inspiration   Abdominal: Soft. Bowel sounds are normal. He exhibits no distension. There is no tenderness. Musculoskeletal: Normal range of motion. No extremity edema, posterior calf or thigh tenderness, palpable cord, discoloration. Negative homans. Lymphadenopathy:     He has no cervical adenopathy. Neurological: He is alert and oriented to person, place, and time. Skin: Skin is warm and dry. Capillary refill takes less than 2 seconds. No rash noted. He is not diaphoretic. No erythema. No pallor. Psychiatric: He has a normal mood and affect. His behavior is normal.   Nursing note and vitals reviewed.       DIAGNOSTIC RESULTS   LABS:    Labs Reviewed   LACTIC ACID, PLASMA - Abnormal; Notable for the following components:       Result Value    Lactic Acid 2.1 (*)     All other components within normal limits    Narrative:     Performed at:  [facility] [address], [facility], [facility]   Phone [phone]   BLOOD GAS, VENOUS - Abnormal; Notable for the following components:    pH, Quirino 7.327 (*)     pCO2, Quirino 52.8 (*)     pO2, Quirino 67.3 (*)     Carboxyhemoglobin 2.3 (*)     All other components within normal limits    Narrative:     Performed at:  [facility] [address], [facility], [facility]   Phone [phone]   CBC WITH AUTO DIFFERENTIAL - Abnormal; Notable for the following components:    RBC 3.01 (*)     Hemoglobin 10.2 (*)     Hematocrit 30.3 (*)     .5 (*)     Lymphocytes # 0.5 (*)     All other components within normal limits    Narrative:     Performed at:  [facility] [address], [facility], [facility] [place] [employer]   Phone [phone]   COMPREHENSIVE METABOLIC PANEL - Abnormal; Notable for the following components:    CREATININE 0.7 (*)     All other components within normal limits    Narrative:     Performed at:  [facility] [address], [facility], [facility]   Phone 21  - Abnormal; Notable for the following components:    Pro- (*)     All other components within normal limits    Narrative:     Performed at:  [facility] [address], [facility], [facility]   Phone [phone]   CULTURE BLOOD #2   CULTURE BLOOD #1   MAGNESIUM    Narrative:     Performed at:  [facility] [address], [facility], [facility]   Phone [phone]   TROPONIN administration in time range)   methylPREDNISolone sodium (SOLU-MEDROL) injection 125 mg (125 mg Intravenous Given 6/8/19 1750)   ipratropium-albuterol (DUONEB) nebulizer solution 1 ampule (1 ampule Inhalation Given 6/8/19 1737)   albuterol (PROVENTIL) nebulizer solution 5 mg (5 mg Nebulization Given 6/8/19 1823)       This patient presents complaining of cough, congestion, shortness of breath, tightness in his throat. He is able to swallow his secretions without difficulty. Chest x-ray is stable aside from chronic metastatic findings. Blood work is relatively stable but does have acute lactic acidosis. Therefore IV fluids ordered. He received breathing treatments here with some improvement. We did order a CT scan of his soft tissue neck and chest.  This is still pending. As this is the end of my shift, my attending will be taking over further care, treatment and disposition. Please see his note for CT results and plan for care/disposition. My suspicion is low for acute strep pharyngitis, peritonsillar or tonsillar abscess, retropharyngeal abscess, bacterial tracheitis, epiglottitis, meningitis, encephalitis, foreign body, angioedema, anaphylaxis, mononucleosis, mumps, lymphoma, leukemia, asthma exacerbation, osteomyelitis, mastoiditis, sepsis, DKA, otitis infection, TM perforation, ACS, PE, myocarditis, pericarditis, endocarditis, acute pulmonary edema, pleural effusion, pericardial effusion, cardiac tamponade, acute CHF exacerbation, thoracic aortic dissection, esophageal rupture, other life-threatening arrhythmia, hypertensive urgency or emergency, hemothorax, pulmonary contusion, subcutaneous emphysema, flail chest, pneumo mediastinum, rib fracture, pneumothorax, or other concerning pathology. FINAL IMPRESSION      1. Shortness of breath    2. Metastatic cancer ([address]    3.  Cough          DISPOSITION/PLAN   DISPOSITION  See attending note      PATIENT REFERREDTO:  No follow-up provider specified.     DISCHARGE MEDICATIONS:  New Prescriptions    No medications on file       DISCONTINUED MEDICATIONS:  Discontinued Medications    No medications on file              (Please note that portions ofthis note were completed with a voice recognition program.  Efforts were made to edit the dictations but occasionally words are mis-transcribed.)    Austin Jamil PA-C (electronically signed)           Austin Jamil PA-C  06/09/19 [id number]

## 2019-06-08 NOTE — ED NOTES
Bed: 16  Expected date:   Expected time:   Means of arrival: [facility]  Comments:  [address]za RN  06/08/19 [id number]

## 2019-06-08 NOTE — ED NOTES
Pt. Reports that he feels his breathing has improved since arrival. Pt's. Wife concerned because pt. Is starting to have depressive episode and did not take his medications this morning. Dr. Pearce Dural notified and states that he will order his home meds.       Malika Jimenes RN  06/08/19 [facility], RN  06/08/19 [id number]

## 2019-06-08 NOTE — ED PROVIDER NOTES
I independently performed a history and physical on Amparo Valadez. All diagnostic, treatment, and disposition decisions were made by myself in conjunction with the advanced practice provider. Briefly, this is a 80 y.o. male here for worsening shortness of breath throughout the day. The patient has a history of COPD, but also feels that his breathing was tight to his throat. He does have a sore throat and hoarse voice. The patient recently found out he may either have a recurrence of his prostate cancer or development of penile cancer. He has not yet started chemotherapy for this. Patient is having mild associated chest tightness/discomfort. He denies pain also. He has not had fever or chills. On exam, patient appears uncomfortable and in moderate distress with some increasing accessory muscle use. He does not have tachypnea. He has dementia breath sounds and wheezing bilaterally. He has hoarseness to his breathing, but no definitive stridor. His airway is intact and his pharynx appears normal.  The patient appears thin and frail. Abdomen is nontender to palpation. EKG  The Ekg interpreted by me in the absence of a cardiologist shows. normal sinus rhythm with a rate of 87  Axis is   Normal  QTc is  normal  Intervals and Durations are unremarkable. No specific ST-T wave changes appreciated. No evidence of acute ischemia. No significant change from prior EKG dated 9/18/2018        MDM  We initiated treatment with a DuoNeb and steroids. After this, the patient had some improvement with increased air movement. I added on 2 additional albuterol nebulizers, which also seemed to help. Given the patient's history of cancer with metastases, I ordered CTs of his soft tissue neck as well as chest to evaluate for other causes of his current presentation. There is no evidence of a mass compressing his airway or airway edema.   His CTs both show evidence of metastases, but nothing that would be

## 2019-06-11 NOTE — DISCHARGE SUMMARY
Gerson Dupont MD   Physician   Internal Medicine   Progress Notes   Signed   Date of Service:  6/1/2019 12:31 PM               Signed                   Show:Clear all  [x]Manual[x]Template[x]Copied    Added by:  [x]Gio Sweet MD      []Yonny for details                                                                                              [facility] HOSPITALISTS DISCHARGE SUMMARY     Patient Demographics     Patient. Lali Rendon  Date of Birth. 1950  MRN. [id number]      Primary care provider. Marcelina Rosado MD  (Tel: [phone])     Admit date: 5/29/2019    Discharge date (blank if same as Note Date): 6/1/2019  Note Date: 6/11/2019      Reason for Hospitalization. Chief Complaint   Patient presents with    Emesis       patient in with complaints of nausea and vomiting. given 500mL bolus per the squad. in via [place]. history of prostate cancer.           Significant Findings. Active Problems:    Nausea and vomiting  Resolved Problems:    * No resolved hospital problems. [facility] Course.     80years old male with a past medical history that includes peptic ulcer disease/hypertension/hyperlipidemia/depression/coronary artery disease/cancer, who presented to the emergency department with a complaint of intractable nausea and vomiting associated with epigastric pain, unclear etiology,after discussing the issue with oncology, it seem that the pt is on Chemotherapy for long time, possibly it was 2/2 Gastroenteritis.  No vomiting since he is in the hospital and he is tolerating oral diet   - Received IV fluid, IV fluid stopped now because the patient is tolerating oral diet  - Phenergan when necessary  - general diet  - We started tapering dexamethasone to 2 mg, plan to stop it upon discharge  - He received Protonix IV  - pt is requested his home oral pain medications, Oncology adjusted it      CAD status post CABG, continue with home medications.     Prostate cancer status post chemotherapy, we'll defer ordering chemotherapy to oncology team.    - plan to biopsy penile mass on Monday, discussed with Dr. Riri Simms, urologist, it seems that Dr. soto will be working on Monday and he will be doing the biopsy.  - MRI ordered by oncology to exclude any METs     Pulmonary nodules, status post CT-guided biopsy, followed by pulmonologist as an outpatient.     Hypertension, blood pressure initially at the ED was elevated, possibly in the setting of pain, now blood pressure was running on the low side, blood pressure medications were adjusted.     Hyperlipidemia, continue on statin     ANN, CPAP during nighttime.        Consults. IP CONSULT TO ONCOLOGY  IP CONSULT TO HOSPITALIST  IP CONSULT TO ONCOLOGY  IP CONSULT TO DIETITIAN  IP CONSULT TO SOCIAL WORK  IP CONSULT TO UROLOGY     Physical examination on discharge day. BP (!) 152/83   Pulse 85   Temp 97.7 °F (36.5 °C) (Oral)   Resp 16   Ht 5' 9\" (1.753 m)   Wt 150 lb (68 kg)   SpO2 96%   BMI 22.15 kg/m²   General appearance. Alert. Looks comfortable. HEENT. Sclera clear. Moist mucus membranes. Cardiovascular. Regular rate and rhythm, normal S1, S2. No murmur. Respiratory. Not using accessory muscles. Clear to auscultation bilaterally, no wheeze. Gastrointestinal. Abdomen soft, non-tender, not distended, normal bowel sounds  Neurology. Facial symmetry. No speech deficits. Moving all extremities equally. Extremities. No edema in lower extremities. Skin. Warm, dry, normal turgor     Condition at time of discharge stable     Medication instructions provided to patient at discharge.      Medication List       CHANGE how you take these medications    albuterol (5 MG/ML) 0.5% nebulizer solution  Commonly known as:  PROVENTIL  Take 1 mL by nebulization 4 times daily as needed for Wheezing  What changed:  Another medication with the same name was removed.  Continue taking this medication, and follow the directions you see here.          CONTINUE taking these medications    abiraterone acetate 250 MG tablet  Commonly known as:  ZYTIGA  Take 4 tablets by mouth daily      ALPRAZolam 1 MG tablet  Commonly known as:  XANAX      ARIPiprazole 10 MG tablet  Commonly known as:  ABILIFY  TAKE 1 TABLET BY MOUTH EVERY DAY      aspirin 81 MG tablet  Take 1 tablet by mouth daily      atorvastatin 20 MG tablet  Commonly known as:  LIPITOR  TAKE ONE TABLET BY MOUTH ONCE NIGHTLY      Blood Pressure Monitor/S Cuff Misc  1 Device by Does not apply route daily      hydrocortisone 2.5 % cream      metoprolol tartrate 25 MG tablet  Commonly known as:  LOPRESSOR  TAKE 1 TABLET BY MOUTH TWICE A DAY      morphine 30 MG extended release tablet  Commonly known as:  MS CONTIN      ondansetron 4 MG disintegrating tablet  Commonly known as:  ZOFRAN ODT  Take 1-2 tablets by mouth every 8 hours as needed for Nausea May substitute the non ODT tablets if not covered financially by the insurance plan.      oxyCODONE-acetaminophen  MG per tablet  Commonly known as:  PERCOCET      pantoprazole 40 MG tablet  Commonly known as:  PROTONIX      potassium chloride 10 MEQ extended release tablet  Commonly known as:  KLOR-CON  Take 2 tablets by mouth daily      predniSONE 5 MG tablet  Commonly known as:  DELTASONE      prochlorperazine 5 MG tablet  Commonly known as:  COMPAZINE  Take 1 tablet by mouth every 6 hours as needed for Nausea      tamsulosin 0.4 MG capsule  Commonly known as:  FLOMAX  Take 1 capsule by mouth daily      umeclidinium-vilanterol 62.5-25 MCG/INH Aepb inhaler  Commonly known as:  ANORO ELLIPTA  Inhale 1 puff into the lungs daily      venlafaxine 150 MG extended release capsule  Commonly known as:  EFFEXOR XR  TAKE 2 CAPSULES BY MOUTH EVERY DAY          STOP taking these medications    cyclobenzaprine 10 MG tablet  Commonly known as:  FLEXERIL      dronabinol 2.5 MG capsule  Commonly known as:  MARINOL      hydrOXYzine 50 MG tablet  Commonly known as: ATARAX      prednisoLONE 5 MG Tabs      traZODone 100 MG tablet  Commonly known as:  DESYREL                Discharge recommendations given to patient. Follow Up. Primary care provider/urologist/oncologist in 1 week   Disposition. home  Activity. activity as tolerated  Diet: No diet orders on file       Spent 35 minutes in discharge process.     Signed:   Megan Agrawal MD             6/11/2019 2:27 PM

## 2019-06-18 NOTE — PROGRESS NOTES
Galina Farris    YOB: 1950     Date of Service:  6/18/2019     Chief Complaint   Patient presents with    Cough     last 4-5 days , ? if mold exposure in home          HPI patient's wife states that approximately 10 days ago patient was around a bonfire and since then he has had a congested cough with brownish phlegm and has been somewhat more short of breath. Was seen in the ER on 6/8 and treated with 1 dose of IV Solu-Medrol and bronchodilators. No fevers or chills. Wife is worried about mold exposure with the bonfire and also at home which is 8-year-old property. Recent diagnosis of metastatic prostate cancer with lung mets-biopsy-proven. Patient was started on Keytruda by oncology yesterday. Allergies   Allergen Reactions    Dicyclomine Other (See Comments)     Muscle spasms    Dye [Iodides] Swelling     ivp dye     Eliquis [Apixaban] Nausea Only     Side effect, not an allergy      Lactose Intolerance (Gi)     Pcn [Penicillins] Swelling     itch    Plavix  [Clopidogrel Bisulfate]     Plavix [Clopidogrel] Other (See Comments)     Flu like sx     Outpatient Medications Marked as Taking for the 6/18/19 encounter (Office Visit) with Ilsa Walsh MD   Medication Sig Dispense Refill    predniSONE (DELTASONE) 10 MG tablet Take 40 mg by mouth for 3 days 30 mg for 3 days 20 mg for 3 days 10 mg for 3 days.  30 tablet 0    doxycycline hyclate (VIBRA-TABS) 100 MG tablet Take 1 tablet by mouth 2 times daily for 7 days 14 tablet 0       Immunization History   Administered Date(s) Administered    Influenza Virus Vaccine 10/04/2011    Influenza, High Dose (Fluzone 65 yrs and older) 11/27/2017, 12/10/2018    Pneumococcal 13-valent Conjugate (Mspfjiq83) 03/17/2017    Pneumococcal Polysaccharide ([id number]) 06/02/2017    Td, unspecified formulation 06/09/2009       Past Medical History:   Diagnosis Date    ADHD (attention deficit hyperactivity disorder)     B[facility]     CAD (coronary artery disease) 2016    Cancer ([facility]     BONE    Cerebrovascular disease 2016    5 TIAs in 3 weeks - R side weakness, numbness R face    COPD (chronic obstructive pulmonary disease) (HCC)     Depression     Hard of hearing     left ear     Hyperlipidemia     Hypertension     Lactose intolerance     OCD (obsessive compulsive disorder)     PFO (patent foramen ovale)     Prolonged Q-T interval on ECG 2017    Prostate cancer ([facility]     PUD (peptic ulcer disease) 1980    GI bleed (\"lost half blood volume\"). no surgery.  Sleep apnea     severe--uses CPAP    Suicide attempt [facility])     [facility] hospitalization    Wound infection 2014    L thigh. [facility] hospitalization     Past Surgical History:   Procedure Laterality Date    CARDIAC SURGERY      COLONOSCOPY  2017    CORONARY ARTERY BYPASS GRAFT  16    cabgx3, PFO closure Joey Caldwell)    CYSTOSCOPY N/A 6/3/2019    SUPRAPUBIC  TUBE PLACEMENT, CYSTOSCOPY performed by Mami Johnson MD at [address] [place] [address] Left 2017    Prophylactic IM nail left femur for impending fracture    LUMBAR LAMINECTOMY      TONSILLECTOMY       Family History   Problem Relation Age of Onset    Hypertension Father 68        , Parkinson's, HTN.  Parkinsonism Father    Arianne Hash Other Mother 80        Alive - hip replacement. Review of Systems:  Review of Systems   Constitutional: Negative for activity change, appetite change, fatigue and fever. HENT: Positive for congestion. Negative for ear discharge, ear pain, postnasal drip, rhinorrhea, sinus pressure, sneezing, sore throat, tinnitus and voice change. Respiratory: Positive for cough, chest tightness, shortness of breath and wheezing. Negative for apnea, choking and stridor. Cardiovascular: Negative for chest pain, palpitations and leg swelling.    Gastrointestinal: Negative for abdominal distention,

## 2019-08-15 PROBLEM — R50.81 NEUTROPENIC FEVER (HCC): Status: ACTIVE | Noted: 2019-01-01

## 2019-08-15 PROBLEM — D70.9 NEUTROPENIC FEVER (HCC): Status: ACTIVE | Noted: 2019-01-01

## 2019-08-15 NOTE — LETTER
including skilled nursing facilities, home health agencies, inpatient rehabilitation facilities, and long term care hospitals. [facility] is working closely with the doctors and other health care providers that care for you during and following your hospital stay and for a period of time after you leave the hospital. By working together, the health care providers are trying to more efficiently provide well-managed, high quality, patient-centered care as you undergo treatment. Hospitals, doctors, and other health care providers that care for you following a hospital stay may receive an additional payment for providing better, more coordinated health care. Medicare will monitor your care to make sure you and others get high quality care. Your feedback is important     Medicare may also ask you to answer a survey about the services and care you received from [address] [place] [address] will be mailed to you. Your feedback will improve care for all people with Medicare who receive care from [facility]. Completion of this survey is optional.     Get more information     For more information about the Bundled Payments for [facility], you can:    · Visit the CMS BPCI Advanced Website at http://gil-[email or web address]/ initiatives/bpci-advanced   · Call the [facility] team at ([phone]. · Call 1-800-MEDICARE (6-182.491.6275). TTY users can call 1-150.304.1970     If you have concerns or complaints about your care, talk to your health care provider, or contact your Beneficiary and Family Centered Quality Improvement Organization [facility]). To get your [facility]'s phone number, visit Medicare.gov/contacts or call 1-800-MEDICARE. · To find a different hospital, visit www. hospitalcompare.[facility].gov or call 1-800Shortlist MEDICARE (1-119.911.3001). TTY users should call 0-709.848.1357. · To find a different doctor, visit Medicare's Physician Compare website, HDTapes.co.nz, or call 1-800-MEDICARE (424 8190). TTY users should call 1-615.161.4015. · To find a different skilled nursing facility, visit [facility] website, https://www.[employer]/, or call 1-800-MEDICARE (1- 176.115.7264). TTY users should call 5-979.786.5460. · To find a different long term care hospital, visit [facility] Compare website, [employer].be, or call 1-800- MEDICARE (654 5727). TTY users should call 2-216.202.5093. · To find a different inpatient rehabilitation facility, visit [facility] Compare website, www.medicare.gov/ inpatientrehabilitation facilitycompare, or call 1-800-MEDICARE (6-297.774.1426). TTY users should call 8- 850.720.2861. · To find a different home health agency, visit [address] website, www.medicare.gov/homehealthcompare, or call 1-800-MEDICARE (9-552- 728-1229). TTY users should call 8-607.206.3053.

## 2019-08-16 NOTE — PROGRESS NOTES
Hospitalist Progress Note      PCP: Vicki Hay MD    Date of Admission: 8/15/2019    Chief Complaint: Fever      Subjective:   Fever up to 101 overnight. Denies chills. Some generalized weakness and fatigue. Medications:  Reviewed    Infusion Medications   Scheduled Medications    morphine  30 mg Oral TID    aspirin  81 mg Oral Daily    venlafaxine  300 mg Oral Daily with breakfast    atorvastatin  20 mg Oral Nightly    ARIPiprazole  10 mg Oral Daily    metoprolol tartrate  25 mg Oral BID    pantoprazole  40 mg Oral Daily    glycopyrrolate-formoterol  2 puff Inhalation BID    Tbo-Filgrastim  300 mcg Subcutaneous QPM    cefepime  2 g Intravenous Q8H    vancomycin  1,000 mg Intravenous Q12H    sodium chloride flush  10 mL Intravenous 2 times per day    enoxaparin  40 mg Subcutaneous Daily    miconazole   Topical BID     PRN Meds: oxyCODONE-acetaminophen, LORazepam, albuterol, diphenhydrAMINE, sodium chloride flush, magnesium hydroxide, ondansetron, acetaminophen      Intake/Output Summary (Last 24 hours) at 8/16/2019 1500  Last data filed at 8/16/2019 1426  Gross per 24 hour   Intake 1110 ml   Output 1800 ml   Net -690 ml       Exam:    /73   Pulse 75   Temp 98.4 °F (36.9 °C) (Oral)   Resp 17   Ht 5' 10\" (1.778 m)   Wt 142 lb 9.6 oz (64.7 kg)   SpO2 96%   BMI 20.46 kg/m²     Gen/overall appearance: Not in acute distress. Alert. Head: Normocephalic, atraumatic  Eyes: EOMI, no scleral icterus  CVS: regular rate and rhythm, Normal S1S2  Pulm: Clear to auscultation bilaterally.  No crackles/wheezes  Gastrointestinal: Soft, nontender, nondistended, no guarding or rebound, suprapubic rihc in place  Extremities: No erythema or warmth  Neuro: No gross focal deficits noted  Skin: Warm, dry    Labs:   Recent Labs     08/15/19  1918 08/16/19  0543   WBC 0.5* 0.8*   HGB 8.2* 8.7*   HCT 23.7* 25.4*    197     Recent Labs     08/15/19  1918 08/16/19  0543   * 132*   K 3.9 3.8 CL 97* 98*   CO2 22 23   BUN 10 9   CREATININE 0.6* 0.7*   CALCIUM 8.3 8.2*     Recent Labs     08/15/19  1918   AST 22   ALT 18   BILITOT 0.4   ALKPHOS 101     Recent Labs     08/15/19  1918   INR 1.30*     No results for input(s): Burnetta Nettle in the last 72 hours. Assessment/Plan:    Active Hospital Problems    Diagnosis Date Noted    Neutropenic fever ([facility] [D70.9, R50.81] 08/15/2019    Bipolar 1 disorder ([facility] [F31.9]     Severe major depression ([facility] [F32.2] 07/07/2017    Prostate cancer metastatic to bone ([facility] [C61, C79.51] 03/28/2017    Hypertension [I10] 01/12/2015     Neutropenic fever likely 2/2 UTI. Underlying hx of prostate ca on chemo  IV cefepime and IV vanc  Follow up cultures  neutropenic isolation ordered  Trend CBC.     UTI.   Hx of suprapubic rich cather  Replaced in ED  Pending UCx  Abx as per above    Prostate cancer with mets   - pain control with prn percocet and MS contin   - hem/onc consulted    PMH of bipolar, GERD    DVT Prophylaxis: lovenox  Diet: DIET GENERAL; Low Microbial  Code Status: Full Code    Dispo - Ryley Stevens MD

## 2019-08-16 NOTE — ED PROVIDER NOTES
who presents to the ED with complaint of a fever. Patient has history of prostate cancer with numerous mets. Patient is on chemotherapy. Patient does have indwelling Centeno catheter. Temperature 101 here in the emergency department. Remaining vital signs unremarkable. IV established and blood work obtained. CBC showed white blood cell count of 0.5. Hemoglobin 8.2. Platelets 446. Patient suffering from what appears to be neutropenia with associated fever and associated chemotherapy. Will start on broad-spectrum antibiotics with cefepime and vancomycin. Given fluids and Toradol here in the ED. CMP showed sodium 130 but otherwise unremarkable. Urinalysis did show positive nitrite with moderate leukocyte esterase. Microscopic urinalysis did show 2+ bacteria. Will send for culture. Does have indwelling Centeno catheter and concern for fever secondary to indwelling Centeno catheter and potential UTI. Lactic acid 0.6. Blood cultures pending. INR 1.3. Chest x-ray showed known history of pulmonary metastatic disease but no acute abnormality. Patient suffering from neutropenic fever with associated indwelling Centeno catheter and concern for UTI. Will require admission for further evaluation and treatment. Case was discussed with hospitalist service who agreed to admit the patient for further evaluation and treatment. FINAL IMPRESSION      1. Neutropenia with fever ([address]    2. Indwelling Centeno catheter present    3. Primary prostate cancer with metastasis from prostate to other site [facility])    4.  Chemotherapy-induced neutropenia [facility])          DISPOSITION/PLAN   DISPOSITION Admitted 08/15/2019 08:56:15 PM      PATIENT REFERREDTO:  Veronica Hernadez [address] [place] [address] Way  [phone]            DISCHARGE MEDICATIONS:  Current Discharge Medication List          DISCONTINUED MEDICATIONS:  Current Discharge Medication List      STOP taking these medications       Pembrolizumab (Romelle Friedens[place] IV) Comments:   Reason for Stopping:         abiraterone acetate (ZYTIGA) 250 MG tablet Comments:   Reason for Stopping:         tamsulosin (FLOMAX) 0.4 MG capsule Comments:   Reason for Stopping:                      (Please note that portions ofthis note were completed with a voice recognition program.  Efforts were made to edit the dictations but occasionally words are mis-transcribed.)    RACHEAL Constantino (electronically signed)          RACHEAL Grijalav  08/15/19 0666

## 2019-08-16 NOTE — H&P
MG/ML) 0.5% nebulizer solution Take 1 mL by nebulization 4 times daily as needed for Wheezing 12/26/18   Brian Boucher MD   Blood Pressure Monitoring (BLOOD PRESSURE MONITOR/S CUFF) MISC 1 Device by Does not apply route daily 12/18/18   Maria Canavan, APRN - CNP   hydrocortisone 2.5 % cream Apply topically every other day Apply topically 2 times daily. Historical Provider, MD   umeclidinium-vilanterol (ANORO ELLIPTA) 62.5-25 MCG/INH AEPB inhaler Inhale 1 puff into the lungs daily 12/10/18   [address] MD   atorvastatin (LIPITOR) 20 MG tablet TAKE ONE TABLET BY MOUTH ONCE NIGHTLY 11/2/18   Connie Malone MD   ondansetron (ZOFRAN ODT) 4 MG disintegrating tablet Take 1-2 tablets by mouth every 8 hours as needed for Nausea May substitute the non ODT tablets if not covered financially by the insurance plan. 9/10/18   Yan Arias MD   aspirin 81 MG tablet Take 1 tablet by mouth daily 7/7/17   VICKI Isaac CNP   potassium chloride (KLOR-CON) 10 MEQ extended release tablet Take 2 tablets by mouth daily 6/9/17   VICKI Bliss CNP   prochlorperazine (COMPAZINE) 5 MG tablet Take 1 tablet by mouth every 6 hours as needed for Nausea 3/21/17   VICKI Bliss CNP         Allergies: Allergies   Allergen Reactions    Dicyclomine Other (See Comments)     Muscle spasms    Dye [Iodides] Swelling     ivp dye     Eliquis [Apixaban] Nausea Only     Side effect, not an allergy      Lactose Intolerance (Gi)     Pcn [Penicillins] Swelling     itch    Plavix  [Clopidogrel Bisulfate]     Plavix [Clopidogrel] Other (See Comments)     Flu like sx        Social History:   reports that he has never smoked. His smokeless tobacco use includes snuff. He reports that he does not drink alcohol or use drugs. Family History:  family history includes Hypertension (age of onset: 68) in his father; Other (age of onset: 80) in his mother; Parkinsonism in his father.      Physical

## 2019-08-16 NOTE — PROGRESS NOTES
4 Eyes Skin Assessment     The patient is being assess for  Admission    I agree that 2 RN's have performed a thorough Head to Toe Skin Assessment on the patient. ALL assessment sites listed below have been assessed. Areas assessed by both nurses:   [x]   Head, Face, and Ears   [x]   Shoulders, Back, and Chest  [x]   Arms, Elbows, and Hands   [x]   Coccyx, Sacrum, and IschIum  [x]   Legs, Feet, and Heels        Does the Patient have Skin Breakdown?   Yes LDA WOUND CARE was Initiated documentation include the Ramya-wound, Wound Assessment, Measurements, Dressing Treatment, Drainage, and Color\",         Fredrick Prevention initiated:  No   Wound Care Orders initiated:  NA      [facility] nurse consulted for Pressure Injury (Stage 3,4, Unstageable, DTI, NWPT, and Complex wounds), New and Established Ostomies:  NA      Nurse 1 eSignature: Electronically signed by Dipika Rawls RN on 8/15/19 at 11:37 PM    **SHARE this note so that the co-signing nurse is able to place an eSignature**    Nurse 2 eSignature: Electronically signed by Hung Mills RN on 8/16/19 at 12:30 AM

## 2019-08-16 NOTE — ED PROVIDER NOTES
effusion. Right hilar adenopathy measuring up to 1.7 cm. Left hilar adenopathy measuring up to 1.6 cm. Subcarinal adenopathy measuring up to 1.4 cm. Coronary artery calcifications. Lungs/pleura: No pneumothorax. No pleural effusion. Multiple pulmonary metastases. Many of these appear new since the previous exam.  Nodule in the right lower lobe posteriorly measures 1.6 x 2.1 cm, new. Nodule medially in the right lower lobe has decreased slightly in size and measures 1.8 x 1.1 cm. Nodule peripherally in the right lower lobe near the base measures up to 1.2 cm, increased. Nodule in the lingula has increased and measures 1.7 x 1.4 cm. Nodule in the lingula abutting the pericardium has increased and measures 1.8 x 1.1 cm. Nodule in the left upper lobe is slightly decreased measuring up to 1.2 x 2.4 cm.  2 nodules in the left upper lobe near the hilum have both significantly increased in size measuring up to 1.9 x 1.3 cm. Upper Abdomen: Unremarkable Soft Tissues/Bones: Innumerable sclerotic lesions noted throughout the skeleton. Overall, these appear similar to previous exam.  No fracture. Overall progressive metastatic disease within the chest. No significant change in osseous sclerotic disease. Xr Chest Portable    Result Date: 8/15/2019  EXAMINATION: ONE XRAY VIEW OF THE CHEST 8/15/2019 7:10 pm COMPARISON: 06/08/2019 radiograph HISTORY: ORDERING SYSTEM PROVIDED HISTORY: sepsis TECHNOLOGIST PROVIDED HISTORY: Reason for exam:->sepsis Reason for Exam: sepsis Acuity: Acute Type of Exam: Initial FINDINGS: Sternotomy wires at midline. Heart and mediastinum are normal.  Pulmonary vascular markings are normal.  Multiple pulmonary nodules are present bilaterally. Comparison to prior radiograph shows no significant change although evaluation is somewhat limited. No acute airspace abnormality. No significant skeletal finding. Known history of pulmonary metastatic disease.   No acute finding or

## 2019-08-17 NOTE — PROGRESS NOTES
Subcutaneous, Daily  acetaminophen (TYLENOL) tablet 650 mg, 650 mg, Oral, Q4H PRN  miconazole (MICOTIN) 2 % powder, , Topical, BID    ASSESSMENT AND PLAN    1. Metastatic Prostate Cancer:    C1D9 of Taxotere. Was treated with Zytiga and Keytruda in the past.    2. Febrile neutropenia:    Cefepime and Vancomycin. Granix. 3. Anemia: Due to chemo. Transfuse if Hb < 7    4. Pain control: MS Contin, Percocet    5.  DVT Px      Otilia Flynn MD

## 2019-08-17 NOTE — PROGRESS NOTES
Pt. Resting quietly; eyes closed. Respirations even, unlabored, and easy. fall precautions in place. Call light/table in reach. Will continue to monitor.

## 2019-08-17 NOTE — PROGRESS NOTES
Call received from pt's spouse \"Kimberly\"; Christopher Aviles given update on pt's status and that pt was resting quietly after taking shower w/ assistance; Christopher Aviles verbalized understanding and asked if pt's \"white count study\" had come \"up\"; Christopher Aviles informed that this RN was unaware if MDs had seen results from any blood tests at this time; Christopher Aviles verbalized understanding. Will continue to monitor.

## 2019-08-18 NOTE — PROGRESS NOTES
08/18/2019    GLUCOSE 105 08/16/2017     Hepatic Function Panel:    Lab Results   Component Value Date    ALKPHOS 101 08/15/2019    ALT 18 08/15/2019    AST 22 08/15/2019    PROT 6.2 08/15/2019    PROT 6.2 08/16/2017    BILITOT 0.4 08/15/2019    BILIDIR <0.2 05/20/2016    IBILI see below 05/20/2016    LABALBU 3.7 08/15/2019     LDH:  No results found for: LDH  PT/INR:    Lab Results   Component Value Date    PROTIME 14.8 08/15/2019    INR 1.30 08/15/2019     PTT:    Lab Results   Component Value Date    APTT 31.8 06/08/2019   [APTT    Current Medications  Current Facility-Administered Medications: albuterol (PROVENTIL) nebulizer solution 2.5 mg, 2.5 mg, Nebulization, 4x Daily PRN  potassium chloride (KLOR-CON M) extended release tablet 40 mEq, 40 mEq, Oral, PRN **OR** potassium bicarb-citric acid (EFFER-K) effervescent tablet 40 mEq, 40 mEq, Oral, PRN **OR** potassium chloride 10 mEq/100 mL IVPB (Peripheral Line), 10 mEq, Intravenous, PRN  magnesium sulfate 1 g in dextrose 5% 100 mL IVPB, 1 g, Intravenous, PRN  0.9 % sodium chloride infusion, , Intravenous, Continuous  polyethylene glycol (GLYCOLAX) packet 17 g, 17 g, Oral, Daily  senna (SENOKOT) tablet 8.6 mg, 1 tablet, Oral, Nightly  morphine (MS CONTIN) extended release tablet 30 mg, 30 mg, Oral, TID  oxyCODONE-acetaminophen (PERCOCET)  MG per tablet 1 tablet, 1 tablet, Oral, Q4H PRN  aspirin chewable tablet 81 mg, 81 mg, Oral, Daily  LORazepam (ATIVAN) tablet 0.5 mg, 0.5 mg, Oral, Q8H PRN  venlafaxine (EFFEXOR XR) extended release capsule 300 mg, 300 mg, Oral, Daily with breakfast  atorvastatin (LIPITOR) tablet 20 mg, 20 mg, Oral, Nightly  ARIPiprazole (ABILIFY) tablet 10 mg, 10 mg, Oral, Daily  metoprolol tartrate (LOPRESSOR) tablet 25 mg, 25 mg, Oral, BID  pantoprazole (PROTONIX) tablet 40 mg, 40 mg, Oral, Daily  glycopyrrolate-formoterol (BEVESPI) 9-4.8 MCG/ACT inhaler 2 puff, 2 puff, Inhalation, BID  Tbo-Filgrastim (GRANIX) injection 300 mcg, 300 mcg, Subcutaneous, QPM  diphenhydrAMINE (BENADRYL) tablet 25 mg, 25 mg, Oral, Q6H PRN  cefepime (MAXIPIME) 2 g IVPB minibag, 2 g, Intravenous, Q8H  sodium chloride flush 0.9 % injection 10 mL, 10 mL, Intravenous, 2 times per day  sodium chloride flush 0.9 % injection 10 mL, 10 mL, Intravenous, PRN  magnesium hydroxide (MILK OF MAGNESIA) 400 MG/5ML suspension 30 mL, 30 mL, Oral, Daily PRN  ondansetron (ZOFRAN) injection 4 mg, 4 mg, Intravenous, Q6H PRN  enoxaparin (LOVENOX) injection 40 mg, 40 mg, Subcutaneous, Daily  acetaminophen (TYLENOL) tablet 650 mg, 650 mg, Oral, Q4H PRN  miconazole (MICOTIN) 2 % powder, , Topical, BID    ASSESSMENT AND PLAN    1. Metastatic Prostate Cancer:    C1D10 of Taxotere. Was treated with Zytiga and Keytruda in the past.    2. Febrile neutropenia:    Cefepime and Vancomycin. Granix. WBCs better    3. Anemia: Due to chemo. Transfuse if Hb < 7    4. Pain control: MS Contin, Percocet    5. DVT Px    6. Constipation: On Senna, start Miralax    7.  DVT Px      Nano Duncan MD

## 2019-08-18 NOTE — FLOWSHEET NOTE
now. Good skin turgor and no acute skin issues noted at this time. MUSCULOSKELETAL : Patient has generalized weakness, and decrease tone, patient wife states weakness is as a result of chemotherapy, patient however able to perform most task with observation and will continue to be implemented as fall risk during shift. OTHER: Patient continue to be in neutropenic precaution. VSS, and  Afebrile. Wife at bedside and no concern voiced at this time.      08/17/19 2000   Vital Signs   Temp 98.5 °F (36.9 °C)   Temp Source Oral   Pulse 82   Heart Rate Source Monitor   Resp 16   /75   BP Location Right upper arm   MAP (mmHg) 92   Patient Position High fowlers   Level of Consciousness 0   MEWS Score 1   Patient Currently in Pain Yes   Pain Assessment   Pain Assessment 0-10   Pain Level 3   Oxygen Therapy   SpO2 95 %   O2 Device None (Room air)

## 2019-08-19 NOTE — PROGRESS NOTES
Routine VSS. Blood verified by 2 RNs. Blood transfusion started. Pt tolerating well at this time. No S/S of a transfusion reaction evident. This RN to stay at bedside for first 15 minutes.

## 2019-08-19 NOTE — PROGRESS NOTES
Patient awake in bed. Pain controlled. Bed alarm engaged. No further needs expressed. Call light within reach. Will continue to monitor.

## 2019-08-19 NOTE — PROGRESS NOTES
Blood finished at 1831. VSS. Scheduled medications administered. Bed alarm engaged No further needs expressed. Call light within reach. Will continue to monitor.  .  Vitals:    08/19/19 1831   BP: (!) 145/81   Pulse: 75   Resp: 16   Temp: 98.2 °F (36.8 °C)   SpO2: 96%

## 2019-08-19 NOTE — PROGRESS NOTES
Patient complaining of chills. No fever. All VS WNL. Prn benadryl and tylenol administered. Resting in bed. No SOB or chest pain. Denies back pain. Patient was feeling sick to stomach before blood transfusion. Wife at bedside. Bed alarm engaged. No further needs expressed. Call light within reach. Will continue to monitor. Vitals:    08/19/19 1701   BP: 135/79   Pulse: 74   Resp: 16   Temp: 98.4 °F (36.9 °C)   SpO2: 94%       Charge RN updated on patient status.

## 2019-08-19 NOTE — PROGRESS NOTES
Patient asleep in bed on L side. Bed alarm engaged. No further needs expressed. Call light within reach. Will continue to monitor.

## 2019-08-19 NOTE — CARE COORDINATION
[address] Transitions Interview     2019    Patient: Valerie Crouch Patient : 1950   MRN: [id number]  Reason for Admission: Fever, fever, currently receiving chemo for prostate CA.   RARS: Readmission Risk Score: 28         Spoke with: Valerie Crouch        Readmission Risk  Patient Active Problem List   Diagnosis    Hyperlipidemia    Severe recurrent major depression without psychotic features ([address]    Alcohol dependence ([address]    Hypertension    TIA (transient ischemic attack)    Hemispheric carotid artery syndrome    Patent foramen ovale    Arachnoid cyst    NSTEMI (non-ST elevated myocardial infarction) ([address]    Unstable angina ([address]    Coronary artery disease involving native coronary artery of native heart with angina pectoris (HCC)    PFO (patent foramen ovale)    S/P CABG x 3    Left hip pain    Left leg weakness    Hydronephrosis of right kidney    Abnormal weight loss    Lytic bone lesions on xray    Bone pain    Prostate cancer ([address]    Neoplasm related pain    Anorexia    Drug-induced constipation    Prostate cancer metastatic to bone (HCC)    Left facial numbness    Bone metastasis (HCC)    Weakness    Bilateral leg edema    Diarrhea    Irritable bowel syndrome with diarrhea    Prolonged Q-T interval on ECG    Severe major depression (HCC)    Subjective tinnitus of both ears    Hearing loss of both ears    Anxiety    Bipolar 1 disorder ([address]    17 LEFT femur IM nail 2/2 metastatic prostate cancer    Fracture    Pulmonary emphysema ([address]    Primary central sleep apnea    Nausea and vomiting    Neutropenic fever [facility])       Inpatient Assessment  Care Transitions Summary    Care Transitions Inpatient Review  Medication Review  Do you have all of your prescriptions and are they filled?:  Yes   Are you able to afford your medications?:  Yes  How often do you have difficulty taking your medications?:  I always take them as prescribed. Housing Review  Who do you live with?:  Child, Partner/Spouse/SO  Are you an active caregiver in your home?:  No  Social Support  Do you have a ?:  No  Do you have a [facility]?:  No  Durable Medical Equipment  Patient DME:  Reji Eb, Straight cane, Wheelchair  Patient Home Equipment:  Nebulizer  Functional Review  Ability to seek help/take action for Emergent/Urgent situations i.e. fire, crime, inclement weather or health crisis. :  Independent  Ability handle personal hygiene needs (bathing/dressing/grooming): Independent  Ability to manage medications: Independent  Ability to prepare food:  Needs Assistance  Ability to maintain home (clean home, laundry):  Needs Assistance  Ability to drive and/or has transportation:  Independent  Ability to do shopping:  Independent  Ability to manage finances: Independent  Is patient able to live independently?:  Yes  Hearing and Vision  Visual Impairment:  Reading glasses  Hearing Impairment:  Hard of hearing, Wears hearing aids  Care Transitions Interventions         Follow Up: Patient/Responsible Party informed about the BPCI-A Medicare Initiative. Explained program and provided them with BP-ABeneficiary Notification Letter. Patient lives in private residence with wife and daughter. He uses a walker, cane, and wheelchair, depending on need and situation. He is independent with most ADL's, wife is there to assist PRN. CTN will monitor for discharge from the hospital and follow up as indicated. No future appointments. Health Maintenance  There are no preventive care reminders to display for this patient.     Kirk Briggs RN

## 2019-08-20 NOTE — PROCEDURES
[facility] Procedure Note  [facility]    Provider: Tejal Montemayor MD Patient ID:  Admission Date: 8/15/2019 Name: Ayan Dupont Date: n/a MRN: [id number]   Patient Location: [address] [place], [place] [id number] : 1950  Attending: Caity Saul MD Date of Service: 2019  PCP: Trina Almanza MD     Diagnoses:  Chronic urinary retention    Procedure:   1. Exchange of sp catheter    Findings:   Successful catheter change    Indication for Procedure:  He has a chronic urinary retention    Procedure Details: The previously placed sp catheter was removed. A new 18 fr silicone catheter was then passed into the bladder. He had return of 10mL of clear yellow urine. 10mL of sterile water was instilled into the Centeno balloon.      Plan:  See Progress Notes for urology plan regarding Jacobo Montemayor MD  2019

## 2019-08-20 NOTE — CONSULTS
[facility] Consult Note  [facility]    Provider: Jose Shah MD Patient ID:  Admission Date: 8/15/2019 Name: Josefa Vega Date: n/a MRN: [id number]   Patient Location: [id number] : 1950  Attending: Magdalene Hernadez MD Date of Service: 2019  PCP: Liz Lewis MD     Diagnoses:  1. Neutropenia with fever ([facility]    2. Indwelling Centeno catheter present    3. Primary prostate cancer with metastasis from prostate to other site [facility])    4. Chemotherapy-induced neutropenia (HCC)    5. Urinary tract infection associated with indwelling urethral catheter, initial encounter ([facility]    6. Cellulitis of abdominal wall      Assessment/Plan:  SP tube changed at the bedside  Discharge when ready and follow up with dr. soto    All the patients questions were answered in detail. He understands the plan as listed above. CC:   Chief Complaint   Patient presents with    Fever     pt is currenly in chemo for prostate CA, states \"it is everywhere\", has had a fever the past 2 days, states took Tylenol today a cpl of times but does not remeber the last time, has also taken pain medications. HPI: Sean Lane is a 77 y.o. male. I saw the patient today for chronic retention, and associated symptoms of nothing, that have been present for months. Symptoms relieved by Centeno catheter exchange and aggravated by uti symptoms. He has tried the following treatments: he has had chronic SP tube changes, and is currently due. Dr. Lesa Hamilton usually changes his catheters in the office.       Past medical History:   He has a past medical history of ADHD (attention deficit hyperactivity disorder), Bipolar 1 disorder ([facility] CAD (coronary artery disease) (2016), Cancer ([facility] Cerebrovascular disease (2016), COPD (chronic obstructive pulmonary disease) ([facility] Depression, Hard of hearing, Hyperlipidemia, Hypertension, Lactose intolerance, OCD (obsessive compulsive disorder), PFO (patent foramen ovale) (2016), Prolonged Q-T interval on ECG (06/2017), Prostate cancer ([facility] PUD (peptic ulcer disease) (6/1980), Sleep apnea, Suicide attempt [facility]) (July, 2014), and Wound infection (July 17, 2014). Past Surgical History:  He has a past surgical history that includes lumbar laminectomy (1995); Tonsillectomy; Coronary artery bypass graft (5/23/16); Colonoscopy (06/05/2017); Cardiac surgery; Femur fracture surgery (Left, 09/21/2017); and Cystoscopy (N/A, 6/3/2019). Allergies: Allergies   Allergen Reactions    Dicyclomine Other (See Comments)     Muscle spasms    Dye [Iodides] Swelling     ivp dye     Eliquis [Apixaban] Nausea Only     Side effect, not an allergy      Lactose Intolerance (Gi)     Pcn [Penicillins] Swelling     itch    Plavix  [Clopidogrel Bisulfate]     Plavix [Clopidogrel] Other (See Comments)     Flu like sx       Social History:  He reports that he has never smoked. His smokeless tobacco use includes snuff. He reports that he does not drink alcohol or use drugs. Family History:  family history includes Hypertension (age of onset: 68) in his father; Other (age of onset: 80) in his mother; Parkinsonism in his father.     Medications:   Scheduled Meds:   ciprofloxacin  500 mg Oral 2 times per day    magic (miracle) mouthwash with nystatin  5 mL Swish & Spit 4x Daily    polyethylene glycol  17 g Oral Daily    senna  1 tablet Oral Nightly    morphine  30 mg Oral TID    aspirin  81 mg Oral Daily    venlafaxine  300 mg Oral Daily with breakfast    atorvastatin  20 mg Oral Nightly    ARIPiprazole  10 mg Oral Daily    metoprolol tartrate  25 mg Oral BID    pantoprazole  40 mg Oral Daily    glycopyrrolate-formoterol  2 puff Inhalation BID    sodium chloride flush  10 mL Intravenous 2 times per day    enoxaparin  40 mg Subcutaneous Daily    miconazole   Topical BID     Continuous Infusions:   sodium chloride 50 mL/hr at 08/20/19 0604     PRN Meds:albuterol, potassium chloride **OR** potassium alternative oral replacement **OR** potassium chloride, magnesium sulfate, oxyCODONE-acetaminophen, LORazepam, diphenhydrAMINE, sodium chloride flush, magnesium hydroxide, ondansetron, acetaminophen    Review of Systems:  Constitutional: Negative for fever    Genitourinary: see HPI  Eyes: negative for sudden change in vision  EENT: no complaints  Cardiovascular: Negative for chest pain  Respiratory: Negative for shortness of breath  Gastrointestinal: Negative for nausea  Musculoskeletal: Negative for back pain   Neurological: Negative for weakness  Psychiatric: Negative for anxiety  Integumentary: Negative for rashes or adenopathy     Physical Exam:  Vitals:    08/20/19 1646   BP: (!) 143/77   Pulse: 70   Resp: 16   Temp: 97.9 °F (36.6 °C)   SpO2: 95%     Constitutional: NAD, well-developed, well-nourished. HEENT: MMM. Hearing intact. PERRL  Neck: no thyroid masses appreciated. Trachea is midline. Neck appears unremarkable   Lymph: no palpable adenopathy in supraclavicular, or axillary lymph nodes  Cardiovascular: Regular rate. No peripheral edema  Respiratory: Respirations are even and non-labored. No audible breath sounds. Abdomen: Soft. No distension, tenderness, hernias, masses or guarding. No CVA tenderness. No hernias appreciated. Liver and spleen appear normal. Sp tube with clear urine. Psychiatric: A + O x 3, normal affect. Insight appears intact. Muskuloskeletal: KELSEY x 4   Skin: Pink, warm and dry. No rashes on face and arms.     Labs:  Lab Results   Component Value Date    WBC 13.4 (H) 08/20/2019    HGB 8.7 (L) 08/20/2019    HCT 25.9 (L) 08/20/2019    MCV 93.1 08/20/2019     08/20/2019     Lab Results   Component Value Date    CREATININE 0.6 (L) 08/20/2019    BUN 5 (L) 08/20/2019     08/20/2019    K 3.5 08/20/2019

## 2019-08-20 NOTE — CONSULTS
updated    Allergies   Allergen Reactions    Dicyclomine Other (See Comments)     Muscle spasms    Dye [Iodides] Swelling     ivp dye     Eliquis [Apixaban] Nausea Only     Side effect, not an allergy      Lactose Intolerance (Gi)     Pcn [Penicillins] Swelling     itch    Plavix  [Clopidogrel Bisulfate]     Plavix [Clopidogrel] Other (See Comments)     Flu like sx       Assessment:     The patient is a 77 y.o. old male who  has a past medical history of ADHD (attention deficit hyperactivity disorder), Bipolar 1 disorder ([facility] CAD (coronary artery disease) (2016), Cancer ([facility] Cerebrovascular disease (2016), COPD (chronic obstructive pulmonary disease) ([facility] Depression, Hard of hearing, Hyperlipidemia, Hypertension, Lactose intolerance, OCD (obsessive compulsive disorder), PFO (patent foramen ovale) (2016), Prolonged Q-T interval on ECG (06/2017), Prostate cancer ([facility] PUD (peptic ulcer disease) (6/1980), Sleep apnea, Suicide attempt [facility]) (July, 2014), and Wound infection (July 17, 2014). with following problems:    · Complicated Klebsiella UTI  · Neutropenia was secondary to chemo and radiation and is resolved  · Metastatic prostate cancer involving right hip bones, penis area, lungs  · Immunocompromised patient  · Suprapubic catheter in place  · Coronary artery disease  · History of stroke  · COPD  · History of depression  · Mixed hyperlipidemia  · History of obstructive sleep apnea    Discussion: This is an unfortunate gentleman with known metastatic prostate cancer originally admitted for neutropenic fever. White cell count has been brought up with Neulasta. Extensive ID work-up has remained negative except urine culture positive for Klebsiella.     The patient has a suprapubic catheter, however, given his presentation, will treat him with a full course of antibiotics        Plan:     Diagnostic Workup:    · Continue to follow fever curve, WBC count and blood cultures  · Follow up on liverand

## 2019-08-20 NOTE — PROGRESS NOTES
[facility]   Progress Note      SUBJECTIVE:   Poor po intake  Pain at Suprapubic cath site  N +ve,tired  C/o constipation. He has mouth sores      OBJECTIVE    Physical  VITALS:  BP (!) 150/83   Pulse 76   Temp 98.6 °F (37 °C) (Oral)   Resp 16   Ht 5' 10\" (1.778 m)   Wt 142 lb 9.6 oz (64.7 kg)   SpO2 95%   BMI 20.46 kg/m²   TEMPERATURE:  Current - Temp: 98.6 °F (37 °C); Max - Temp  Av.1 °F (36.7 °C)  Min: 97.5 °F (36.4 °C)  Max: 98.6 °F (37 °C)  BLOOD PRESSURE RANGE:  Systolic (78WNL), DSX:344 , Min:94 , BFH:958   ; Diastolic (07IRJ), MMA:84, Min:56, Max:83    24HR INTAKE/OUTPUT:      Intake/Output Summary (Last 24 hours) at 2019 0845  Last data filed at 2019 6989  Gross per 24 hour   Intake 3287.74 ml   Output 1075 ml   Net 2212.74 ml       Conscious alert oriented  HEENT: + Pallor   Neck: Supple. No lymphadenopathy  Lungs: CTA. Respiratory efforts normal.  Abdomen: Soft BS +. No organomegaly. Extremities: No edema  Neuro: No focal deficits.   Skin: No Rash Petechiae      Data  Labs:  General Labs:  CBC with Differential:    Lab Results   Component Value Date    WBC 13.4 2019    RBC 2.78 2019    RBC 3.47 2017    HGB 8.7 2019    HCT 25.9 2019     2019    MCV 93.1 2019    MCH 31.1 2019    MCHC 33.4 2019    RDW 16.3 2019    NRBC 3 2019    NRBC 2 2019    BANDSPCT 29 2019    BLASTSPCT 2 2019    METASPCT 6 2019    LYMPHOPCT 16.0 2019    LYMPHOPCT 25.7 2017    MONOPCT 2.0 2019    MYELOPCT 2 2019    BASOPCT 0.0 2019    MONOSABS 0.1 2019    LYMPHSABS 1.0 2019    EOSABS 0.2 2019    BASOSABS 0.0 2019     BMP:    Lab Results   Component Value Date     2019    K 3.5 2019    K 3.9 08/15/2019     2019    CO2 22 2019    BUN 5 2019    LABALBU 3.7 08/15/2019    CREATININE 0.6 2019    CALCIUM 8.0 Daily  glycopyrrolate-formoterol (BEVESPI) 9-4.8 MCG/ACT inhaler 2 puff, 2 puff, Inhalation, BID  diphenhydrAMINE (BENADRYL) tablet 25 mg, 25 mg, Oral, Q6H PRN  cefepime (MAXIPIME) 2 g IVPB minibag, 2 g, Intravenous, Q8H  sodium chloride flush 0.9 % injection 10 mL, 10 mL, Intravenous, 2 times per day  sodium chloride flush 0.9 % injection 10 mL, 10 mL, Intravenous, PRN  magnesium hydroxide (MILK OF MAGNESIA) 400 MG/5ML suspension 30 mL, 30 mL, Oral, Daily PRN  ondansetron (ZOFRAN) injection 4 mg, 4 mg, Intravenous, Q6H PRN  enoxaparin (LOVENOX) injection 40 mg, 40 mg, Subcutaneous, Daily  acetaminophen (TYLENOL) tablet 650 mg, 650 mg, Oral, Q4H PRN  miconazole (MICOTIN) 2 % powder, , Topical, BID    ASSESSMENT AND PLAN    1. Metastatic Prostate Cancer:    C1D10 of Taxotere. Was treated with Zytiga and Keytruda in the past.    2. Febrile neutropenia:    Cefepime and Vancomycin. WBCs better  Will stop Granix after today    3. Anemia: Due to chemo. Transfuse if Hb < 7    4. Pain control: MS Contin, Percocet    5. DVT Px    6. Constipation: On Senna, start Miralax    7. DVT Px    8. Mucositis d/t chemotherapy  - Magic mouthwash    Neutropenia resolved. K. Pneumoniae UTI sensitive to multiple antibiotics including Cipro.    Suprapubic cath site purulent Discharge-Urology c/s to change  ID to get Duration for ABX  WBC normal    PT/OT evel and TX  Encourage po supplements    Once stable may DC in 1-2 days    TORO w Dr. Elisabet Kothari on Friday 8/23/19    DW Pt ,family and bed side RN    Deshaun De Souza MD   Hem-Oncology/ [facility] and [address] [place] [address],[address] office    O: [phone]  Toll free: [phone]    Can send message on Perfect serve if any urgent need

## 2019-08-20 NOTE — PROGRESS NOTES
Prn ativan administered per patient request for anxiety. Wife at bedside. Bed alarm engaged. No further needs expressed. Call light within reach. Will continue to monitor.

## 2019-08-21 PROBLEM — Z71.89 ENCOUNTER FOR MEDICATION COUNSELING: Status: ACTIVE | Noted: 2017-09-18

## 2019-08-21 NOTE — CARE COORDINATION
Patient is okay to be discharged from infectious disease standpoint, once cleared by primary service and other sub-specialties. My final orders are in the chart. Feel free to call me with questions, if needed.       Umang Barrett MD, MPH  8/21/2019, 3:10 PM  [facility] Infectious Disease   Office: [phone]  Fax: [phone]  Tuesday AM clinic:   [address] [address]  Thursday AM clinic: [address] [facility]

## 2019-08-21 NOTE — PROGRESS NOTES
Infectious Diseases   Progress Note      Admission Date: 8/15/2019  Hospital Day: Hospital Day: 7 .cur  Attending: Madison Morley MD  Date of service: 8/21/19     Chief complaint/ Reason for consult: The patient was seen today for the following:    · Complicated Klebsiella UTI  · Neutropenia was secondary to chemo and radiation and is resolved  · Metastatic prostate cancer involving right hip bones, penis area, lungs  · Immunocompromised patient  · Suprapubic catheter in place  · Coronary artery disease    Microbiology:        I have reviewed all available micro lab data and cultures    · Blood culture (2/2) - collected on 8/15/2019: Negative    · Urine culture  - collected on 8/15/2019: *Greater than 100,000 CFU per mL of Klebsiella pneumoniae    Klebsiella pneumoniae (1)     Antibiotic Interpretation YONG Status    ampicillin Resistant >=32 mcg/mL     ceFAZolin Sensitive <=4 mcg/mL      NOTE: Cefazolin should only be used for uncomplicated UTI        for E.coli or Klebsiella pneumoniae. cefepime Sensitive <=0.12 mcg/mL     cefTRIAXone Sensitive <=0.25 mcg/mL     ciprofloxacin Sensitive <=0.25 mcg/mL     gentamicin Sensitive <=1 mcg/mL     levofloxacin Sensitive <=0.12 mcg/mL     nitrofurantoin Intermediate 64 mcg/mL     piperacillin-tazobactam Sensitive <=4 mcg/mL     trimethoprim-sulfamethoxazole Sensitive <=20 mcg/mL           Antibiotics and immunizations:       Current antibiotics: All antibiotics and their doses were reviewed by me    Recent Abx Admin                   ciprofloxacin (CIPRO) tablet 500 mg (mg) 500 mg Given 08/21/19 0939     500 mg Given 08/20/19 2146                  Immunization History: All immunization history was reviewed by me today.     Immunization History   Administered Date(s) Administered    Influenza Virus Vaccine 10/04/2011    Influenza, High Dose (Fluzone 65 yrs and older) 11/27/2017, 12/10/2018    Pneumococcal Conjugate 13-valent ([id number]) 03/17/2017    Pneumococcal

## 2019-08-21 NOTE — DISCHARGE INSTR - COC
Continuity of Care Form    Patient Name: Hoda Boles   :  1950  MRN:  [id number]    Admit date:  8/15/2019  Discharge date:  ***    Code Status Order: Full Code   Advance Directives:   Advance Care Flowsheet Documentation     Date/Time Healthcare Directive Type of Healthcare Directive Copy in [address] Agent's Name Healthcare Agent's Phone Number    08/15/19 4906  Yes, patient has an advance directive for healthcare treatment  Durable power of  for health care;Living will  Yes, copy in chart  [address]  --          Admitting Physician:  Lauren Mcclellan MD  PCP: Audra Lucia MD    Discharging Nurse: [facility] Unit/Room#: 5FL-2020/[id number]  Discharging Unit Phone Number: ***    Emergency Contact:   Extended Emergency Contact Information  Primary Emergency Contact: [facility]  Address: [address] Phone: [phone]  Mobile Phone: [phone]  Relation: Spouse    Past Surgical History:  Past Surgical History:   Procedure Laterality Date    CARDIAC SURGERY      COLONOSCOPY  2017    CORONARY ARTERY BYPASS GRAFT  16    [id number], PFO closure Danya Parents)    CYSTOSCOPY N/A 6/3/2019    SUPRAPUBIC  TUBE PLACEMENT, CYSTOSCOPY performed by Francisco Holland MD at [facility] 2017    Prophylactic IM nail left femur for impending fracture    LUMBAR LAMINECTOMY      TONSILLECTOMY         Immunization History:   Immunization History   Administered Date(s) Administered    Influenza Virus Vaccine 10/04/2011    Influenza, High Dose (Fluzone 65 yrs and older) 2017, 12/10/2018    Pneumococcal Conjugate 13-valent ([id number]) 2017    Pneumococcal Polysaccharide ([id number]) 2017    Td, unspecified formulation 2009       Active Problems:  Patient Active Problem List   Diagnosis Code    Hyperlipidemia E78.5 Treatments: ***  Oxygen Therapy:  {Therapy; copd oxygen:18770}  Ventilator:    {MH CC Vent ZZEC:433272614}    Rehab Therapies: {THERAPEUTIC INTERVENTION:6100292834}  Weight Bearing Status/Restrictions: [address] CC Weight Bearin}  Other Medical Equipment (for information only, NOT a DME order):  {EQUIPMENT:416764200}  Other Treatments: ***    Patient's personal belongings (please select all that are sent with patient):  {CHP DME Belongings:910607083}    RN SIGNATURE:  {Esignature:392708275}    CASE MANAGEMENT/SOCIAL WORK SECTION    Inpatient Status Date: ***    Readmission Risk Assessment Score:  Readmission Risk              Risk of Unplanned Readmission:        36           Discharging to Facility/ Agency   · Name:   · Address:  · Phone:  · Fax:    Dialysis Facility (if applicable)   · Name:  · Address:  · Dialysis Schedule:  · Phone:  · Fax:    / signature: {Esignature:[id number]}    PHYSICIAN SECTION    Prognosis: Good    Condition at Discharge: Stable    Rehab Potential (if transferring to Rehab): Good    Recommended Labs or Other Treatments After Discharge: CBC monitoring    Physician Certification: I certify the above information and transfer of Ramírez Pozo  is necessary for the continuing treatment of the diagnosis listed and that he requires Home Care for greater 30 days.      Update Admission H&P: No change in H&P    PHYSICIAN SIGNATURE:  Electronically signed by Raymond Crandall MD on 19 at 1:40 PM

## 2019-08-21 NOTE — PROGRESS NOTES
08/15/2019    CREATININE <0.5 08/21/2019    CALCIUM 7.8 08/21/2019    GFRAA >60 08/21/2019    LABGLOM >60 08/21/2019    GLUCOSE 84 08/21/2019    GLUCOSE 105 08/16/2017     Hepatic Function Panel:    Lab Results   Component Value Date    ALKPHOS 101 08/15/2019    ALT 18 08/15/2019    AST 22 08/15/2019    PROT 6.2 08/15/2019    PROT 6.2 08/16/2017    BILITOT 0.4 08/15/2019    BILIDIR <0.2 05/20/2016    IBILI see below 05/20/2016    LABALBU 3.7 08/15/2019     LDH:  No results found for: LDH  PT/INR:    Lab Results   Component Value Date    PROTIME 14.8 08/15/2019    INR 1.30 08/15/2019     PTT:    Lab Results   Component Value Date    APTT 31.8 06/08/2019   [APTT    Current Medications  Current Facility-Administered Medications: ciprofloxacin (CIPRO) tablet 500 mg, 500 mg, Oral, 2 times per day  magic (miracle) mouthwash with nystatin, 5 mL, Swish & Spit, 4x Daily  albuterol (PROVENTIL) nebulizer solution 2.5 mg, 2.5 mg, Nebulization, 4x Daily PRN  potassium chloride (KLOR-CON M) extended release tablet 40 mEq, 40 mEq, Oral, PRN **OR** potassium bicarb-citric acid (EFFER-K) effervescent tablet 40 mEq, 40 mEq, Oral, PRN **OR** potassium chloride 10 mEq/100 mL IVPB (Peripheral Line), 10 mEq, Intravenous, PRN  magnesium sulfate 1 g in dextrose 5% 100 mL IVPB, 1 g, Intravenous, PRN  0.9 % sodium chloride infusion, , Intravenous, Continuous  polyethylene glycol (GLYCOLAX) packet 17 g, 17 g, Oral, Daily  senna (SENOKOT) tablet 8.6 mg, 1 tablet, Oral, Nightly  morphine (MS CONTIN) extended release tablet 30 mg, 30 mg, Oral, TID  oxyCODONE-acetaminophen (PERCOCET)  MG per tablet 1 tablet, 1 tablet, Oral, Q4H PRN  aspirin chewable tablet 81 mg, 81 mg, Oral, Daily  LORazepam (ATIVAN) tablet 0.5 mg, 0.5 mg, Oral, Q8H PRN  venlafaxine (EFFEXOR XR) extended release capsule 300 mg, 300 mg, Oral, Daily with breakfast  atorvastatin (LIPITOR) tablet 20 mg, 20 mg, Oral, Nightly  ARIPiprazole (ABILIFY) tablet 10 mg, 10 mg, Oral, Daily  metoprolol tartrate (LOPRESSOR) tablet 25 mg, 25 mg, Oral, BID  pantoprazole (PROTONIX) tablet 40 mg, 40 mg, Oral, Daily  glycopyrrolate-formoterol (BEVESPI) 9-4.8 MCG/ACT inhaler 2 puff, 2 puff, Inhalation, BID  diphenhydrAMINE (BENADRYL) tablet 25 mg, 25 mg, Oral, Q6H PRN  sodium chloride flush 0.9 % injection 10 mL, 10 mL, Intravenous, 2 times per day  sodium chloride flush 0.9 % injection 10 mL, 10 mL, Intravenous, PRN  magnesium hydroxide (MILK OF MAGNESIA) 400 MG/5ML suspension 30 mL, 30 mL, Oral, Daily PRN  ondansetron (ZOFRAN) injection 4 mg, 4 mg, Intravenous, Q6H PRN  enoxaparin (LOVENOX) injection 40 mg, 40 mg, Subcutaneous, Daily  acetaminophen (TYLENOL) tablet 650 mg, 650 mg, Oral, Q4H PRN  miconazole (MICOTIN) 2 % powder, , Topical, BID    ASSESSMENT AND PLAN    1. Metastatic Prostate Cancer:    S/p C1D12 of Taxotere. Was treated with Zytiga and Keytruda in the past.    2. Febrile neutropenia:    Cefepime and Vancomycin. WBCs better  Will stop Granix after today    3. Anemia: Due to chemo. Transfuse if Hb < 7    4. Pain control: MS Contin, Percocet    5. DVT Px    6. Constipation: On Senna, start Miralax    7. DVT Px    8. Mucositis d/t chemotherapy  - Magic mouthwash    Neutropenia resolved. K. Pneumoniae UTI sensitive to multiple antibiotics including Cipro. Changed to po ABX per ID  Urology change Suprapubic cath  Appriciate Urology and ID    Symptomatic tx for GI sx    Need PT/OT evel and rec.  For Safe DC planning    WBC normal    Encourage po supplements    Once stable may DC in 1-2 days    FU w Dr. Miguel A Siddiqui on Friday 8/26/19 if DC by tomorrow or on Friday    DW Pt ,family and bed side RN    Jyotsna Barker MD   Hem-Oncology/ [facility] and [address] [place] [address],[address] office    O: [phone]  Toll free: [phone]    Can send message on Perfect serve if any urgent need

## 2019-08-21 NOTE — DISCHARGE SUMMARY
discharge home with [facility], encourage po food intake, follow up with PCP as scheduled post completion of antibiotics. Supra pubic Catheter  changed by urology this admission. Consults. IP CONSULT TO HOSPITALIST  IP CONSULT TO HEM/ONC  IP CONSULT TO UROLOGY  IP CONSULT TO INFECTIOUS DISEASES    Physical examination on discharge day. BP (!) 157/88   Pulse 66   Temp 98.1 °F (36.7 °C) (Oral)   Resp 18   Ht 5' 10\" (1.778 m)   Wt 142 lb 9.6 oz (64.7 kg)   SpO2 93%   BMI 20.46 kg/m²   General appearance. Alert. Looks comfortable. HEENT. Sclera clear. Moist mucus membranes. Cardiovascular. Regular rate and rhythm, normal S1, S2. No murmur. Respiratory. Not using accessory muscles. Clear to auscultation bilaterally, no wheeze. Gastrointestinal. Abdomen soft, non-tender, not distended, supra pubic cath insitu  Extremities. No edema in lower extremities. Wasting of interosseous muscles of hands noted   Skin. Warm, dry, normal turgor    Medication instructions provided to patient at discharge.      Medication List      START taking these medications    ciprofloxacin 500 MG tablet  Commonly known as:  CIPRO  Take 1 tablet by mouth every 12 hours for 7 days        CONTINUE taking these medications    albuterol (5 MG/ML) 0.5% nebulizer solution  Commonly known as:  PROVENTIL  Take 1 mL by nebulization 4 times daily as needed for Wheezing     ARIPiprazole 10 MG tablet  Commonly known as:  ABILIFY  TAKE 1 TABLET BY MOUTH EVERY DAY     aspirin 81 MG tablet  Take 1 tablet by mouth daily     atorvastatin 20 MG tablet  Commonly known as:  LIPITOR  TAKE ONE TABLET BY MOUTH ONCE NIGHTLY     Blood Pressure Monitor/S Cuff Misc  1 Device by Does not apply route daily     hydrocortisone 2.5 % cream     LORazepam 1 MG tablet  Commonly known as:  ATIVAN     metoprolol tartrate 25 MG tablet  Commonly known as:  LOPRESSOR  TAKE 1 TABLET BY MOUTH TWICE A DAY     morphine 30 MG extended release tablet  Commonly known as:  MS CONTIN

## 2019-08-21 NOTE — PROGRESS NOTES
VSS; respirations even, easy, and unlabored. Shift assessment complete, pt A&OX4. Pt agitated and stating that he \"feels like i'm withdrawing. \" Patient's wife very upset that his MS contin was not given at exactly 0900. This RN explained to patient's wife that sometimes medications are not given on the exact minute d/t multiple patient's being assigned to each nurse and multiple patient needs. Patient's wife still very upset yelling at this RN. Wife stated, \"you need to go get his medications right now and give them to him now. \"   Scheduled medications administered per orders. Prn percocet administered for pain. Patient assisted to bathroom and back to bed. Ramya care provided for BM. New top sheet and blanket provided. Declined gown changed, but gown left with patient. Wife has left room. Bed alarm engaged. No further needs expressed. Call light within reach. Will continue to monitor.

## 2019-08-26 NOTE — CARE COORDINATION
[facility] [address] Transitions Follow Up Call    2019    Patient: Codi Oreilly  Patient : 1950   MRN: [id number]  Reason for Admission: Neutropenia, fever, UTI, receiving chemo for prostate cancer  Discharge Date: 19 RARS: Readmission Risk Score: 39         Spoke with: Wife Lamont Owens Transitions Subsequent and Final Call    Subsequent and Final Calls  Do you have any ongoing symptoms?:  No  Have your medications changed?:  No  Do you have any questions related to your medications?:  No  Do you currently have any active services?:  No  Are you currently active with any services?:  Home Health  Do you have any needs or concerns that I can assist you with?:  Yes  Patient-reported Needs or Concerns:  Wife would like home care evaluation  Identified Barriers:  None  Care Transitions Interventions  Other Interventions: Follow Up: Wife left  for CTN 19 explaining that she would like help initiating home care. In the mean time, patient came back to ED , 19 at direction of [facility], for nausea. She reports that he has lower extremity edema, but that today was the first day in quite some time that patient woke up without nausea. Discussed discharge instructions, reviewed new medications (Cipro). Wife reports that patient will see Dr. Lou Chew today @ 1:00. CTN encouraged her to discuss home care at that time and if they agree that it is needed, Dr. Lou Chew will place order for home health care. Wife also reports that patient is having burning and pain with suprapubic catheter, CTN encouraged her to call Urologist today and let them know about the symptoms, she verbalized understanding. Wife had some questions and concerns regarding inpatient stay, patient advocate contact information provided. CTN will transition to [employer] for follow up. No future appointments.     Ashly Barron, RN

## 2019-08-28 PROBLEM — G93.40 ENCEPHALOPATHY ACUTE: Status: ACTIVE | Noted: 2019-01-01

## 2019-08-29 NOTE — PROGRESS NOTES
Physical Therapy    Facility/Department: [address]  Initial Assessment/Discharge Summary     NAME: Dev Flores  : 1950  MRN: [id number]    Date of Service: 2019    Discharge Recommendations: Dev Flores scored a 23/24 on the AM-PAC short mobility form. Current research shows that an AM-PAC score of 18 or greater is typically associated with a discharge to the patient's home setting. Based on the patients AM-PAC score and their current functional mobility deficits, it is recommended that the patient have 2-3 sessions per week of Physical Therapy at d/c to increase the patients independence. HOME HEALTH CARE: LEVEL 1 STANDARD    - Initial home health evaluation to occur within 24-48 hours, in patient home   - Therapy to evaluate with goal of regaining prior level of functioning   - Therapy to evaluate if patient has [address] needs for personal care        PT Equipment Recommendations  Equipment Needed: No    Assessment   Body structures, Functions, Activity limitations: Decreased functional mobility ; Decreased endurance;Decreased strength  Assessment: Pt presents with above limitations and requires a rolling walker for energy conservation and support with ambulation. Pt uses a cane at baseline, but agrees to using a rolling walker upon d/c from hospital until he regains strength. Pt demonstrates independece with bed mobility and transfers, and safely ambulated with rolling walker, therefore pt will be d/c from acute PT caseload. Prognosis: Good  Decision Making: Low Complexity  Clinical Presentation: stable  PT Education: Goals;PT Role;Plan of Care  Patient Education: d/c recommendations  Barriers to Learning: hearing  REQUIRES PT FOLLOW UP: No  Activity Tolerance  Activity Tolerance: Patient Tolerated treatment well       Patient Diagnosis(es): The primary encounter diagnosis was Altered mental status, unspecified altered mental status type.  A diagnosis of Prostate cancer Yes  Patient assessed for rehabilitation services?: Yes  Family / Caregiver Present: No  General Comment  Comments: Pt sidelying in bed sleeping upon arrival.  Subjective  Subjective: Pt agreeable to PT evaluation and denies pain at this time.    Pain Screening  Patient Currently in Pain: Denies  Vital Signs  Patient Currently in Pain: Denies       Orientation  Orientation  Overall Orientation Status: Within Normal Limits(increased time to answer questions, pt drowsy after waking up)  Social/Functional History  Social/Functional History  Lives With: Family(wife and daughter)  Type of Home: House  Home Layout: Two level, Able to Live on Main level with bedroom/bathroom  Home Access: Stairs to enter without rails  Entrance Stairs - Number of Steps: 2 KELVIN  Bathroom Shower/Tub: Tub/Shower unit, Shower chair with back  [employer]: Grab bars in shower, Shower chair  Bathroom Accessibility: Accessible  Home Equipment: Rolling walker, Cane  ADL Assistance: Needs assistance  Homemaking Assistance: Needs assistance(wife and daughter do cooking/cleaning)  Ambulation Assistance: Independent(cane)  Transfer Assistance: Independent  Active : Yes  Occupation: Retired  Additional Comments: no falls  Cognition        Objective     Observation/Palpation  Posture: Good    AROM RLE (degrees)  RLE AROM: WFL  AROM LLE (degrees)  LLE AROM : WFL  Strength RLE  Strength RLE: WFL  Comment: gross LE strength 3+/5  Strength LLE  Strength LLE: WFL  Comment: gross LE strength 3+/5  Tone RLE  RLE Tone: Normotonic  Tone LLE  LLE Tone: Normotonic  Motor Control  Gross Motor?: WFL  Sensation  Overall Sensation Status: WFL  Bed mobility  Bridging: Modified independent   Rolling to Right: Modified independent  Supine to Sit: Modified independent  Sit to Supine: Modified independent  Scooting: Modified independent  Transfers  Sit to Stand: Supervision(1x from EOB, 1x from toilet)  Stand to sit: Supervision  Ambulation  Ambulation?:

## 2019-08-29 NOTE — CONSULTS
[facility]   Consult Note      8/29/2019 8:52 AM        Name: Ramses Maradiaga . Admitted: 8/28/2019    HPI:  Romero Kimbrough is a patient of Dr. Tiffany Hankins with metastatic prostate cancer. He has bone mets to right hip, s/p radiation and a penile mass, also recently radiated and mass has decreased in size. He has a suprapubic catheter d/t the penile mass. Previously treated with [facility]. Recent CT scan showed progression of disease in lungs, treatment changed to Taxotere, first dose was given on 8/9/19, he is due for C2 8/30/19. He has an anal mass, has been simulated for radiation therapy, but has not started treatments yet. He has had ongoing nausea. He was taking phenergan and Zofran 4 mg without relief. The patient was recently seen by Dr. Tiffany Hankins on 8/26/19. At that time Zofran was recently increased to 8mg every 8 hrs and he was started on dexamethasone 2 mg BID. He was admitted with weakness, confusion, and unsteady gait. His wife thought his left side was weaker and was concerned about stroke. He has history of TIA. CT head showed no acute intracranial abnormality. Neurology has been consulted. MRI brain pending.       Reviewed interval ancillary notes    Current Medications    albuterol (PROVENTIL) nebulizer solution 5 mg 4x Daily PRN   ARIPiprazole (ABILIFY) tablet 10 mg Daily   aspirin chewable tablet 81 mg Daily   atorvastatin (LIPITOR) tablet 20 mg Nightly   metoprolol tartrate (LOPRESSOR) tablet 25 mg BID   oxyCODONE-acetaminophen (PERCOCET)  MG per tablet 1 tablet Q8H PRN   pantoprazole (PROTONIX) tablet 40 mg Daily   potassium chloride (KLOR-CON) extended release tablet 20 mEq Daily   venlafaxine (EFFEXOR XR) extended release capsule 150 mg Daily with breakfast   sodium chloride flush 0.9 % injection 10 mL 2 times per day   sodium chloride flush 0.9 % injection 10 mL PRN   magnesium hydroxide (MILK OF MAGNESIA) 400 MG/5ML suspension 30 mL Daily PRN   enoxaparin depression ([facility] [address]    Bipolar 1 disorder ([facility] [address]    Encephalopathy acute  Resolved Problems:    * No resolved hospital problems. *      Metastatic prostate cancer  - previous tx with Marge Ledesma with progressive disease  - started Taxotere on 8/9/19, will hold while inpatient    Weakness/confusion  - neurology consulted  - MRI brain pending. Nausea  - continue zofran    Neoplasm related pain  - continue home pain medication regimen       Amara Olivares CNP  [facility]    Patient was seen and examined. Agree with above. Await neurology consultation- ? TIA.     Rosalia Heimlich, MD

## 2019-08-29 NOTE — PROGRESS NOTES
Minimally prolonged mastication was noted with suspected pharyngeal pooling and delayed swallow initiation. Minimal diffuse stasis was noted on lingual surface. Pt was able to clear with use of liquid wash. Occasionally post swallows delayed throat clearing was noted. Wife reported throat clearing throughout the day is baseline for pt. Overall pt appeared to demonstrate gross tolerance of regular solids and thin liquids. Recommend diet advance at this time with use of aspiration precautions and close diet tolerance monitoring. Dietary Recommendations:   1) Recommend advance to Regular texture diet with thin liquids, meds as tolerated   2) If respiratory status declines recommend downgrade to Nectar thick liquids     Strategies: 90 degree positioning with all p.o. intake; small bites/sips; alternate textures through meal; reduce rate of intake; No straws     Dysphagia Treatment/Goals: Speech therapy for dysphagia tx 3-5 times per week. ST.) Pt will tolerate recommended diet without s/s of aspiration   2.) If clinical symptoms of penetration/aspiration continue to be noted,Pt will tolerate MBS to r/o aspiration and determine appropriate diet/liquid level. SPEECH LANGUAGE EVALUATION:  Speech Language Treatment Diagnosis:     Speech Language Impressions: Pt was awake and alert. He is [place] therefore benefited from increased volume and intermittent repetitions. Wife reported pt has been increasingly confused at home and is not at his cognitive baseline.  See below for more information     Oral Motor exam/Motor Speech:  -No dysarthria noted     Verbal Expression:   -Completed automatic speech tasks WFL   -Confrontational naming WFL   -Largely able to communicate simple wants/needs, sometimes impaired by cognitive status   -Divergent naming completed with 70% accuracy     Auditory Comprehension:   -Followed 1-2 step verbal commands with 95% accuracy, min cues   -Comprehended yes/no questions with approx 80%

## 2019-08-29 NOTE — PROGRESS NOTES
Hospitalist Progress Note      PCP: Trina Almanza MD    Date of Admission: 8/28/2019    Chief Complaint:  [facility] Course:   Joy Hunt is a 77 y.o. male who presented with   Mode of arrival : Walk in  Patient came with family   Reported to be more confused since yesterday   Patient is aware that he is confused . Also having gait issues @ home and seemingly more weaker than his baseline . Is s/p completion of course of Abx for recent UTI   D/larisa on 8/21/19 from last admission  Is on Chemo RX as OP     Subjective:    still very confused. Not making sense      Medications:  Reviewed    Infusion Medications    sodium chloride 75 mL/hr at 08/29/19 1344     Scheduled Medications    ARIPiprazole  10 mg Oral Daily    aspirin  81 mg Oral Daily    atorvastatin  20 mg Oral Nightly    metoprolol tartrate  25 mg Oral BID    pantoprazole  40 mg Oral Daily    potassium chloride  20 mEq Oral Daily    venlafaxine  150 mg Oral Daily with breakfast    sodium chloride flush  10 mL Intravenous 2 times per day    enoxaparin  40 mg Subcutaneous Daily    dexamethasone  2 mg Oral 2 times per day    morphine  30 mg Oral TID     PRN Meds: albuterol, oxyCODONE-acetaminophen, sodium chloride flush, magnesium hydroxide, labetalol, ondansetron      Intake/Output Summary (Last 24 hours) at 8/29/2019 1830  Last data filed at 8/29/2019 1344  Gross per 24 hour   Intake 560 ml   Output 900 ml   Net -340 ml       Physical Exam Performed:    BP (!) 153/69   Pulse 63   Temp 98.3 °F (36.8 °C) (Oral)   Resp 18   Ht 5' 10\" (1.778 m)   Wt 138 lb 3.2 oz (62.7 kg)   SpO2 95%   BMI 19.83 kg/m²     General appearance: No apparent distress, appears stated age and cooperative. HEENT: Pupils equal, round, and reactive to light. Conjunctivae/corneas clear. Neck: Supple, with full range of motion. No jugular venous distention. Trachea midline. Respiratory:  Normal respiratory effort.  Clear to auscultation, bilaterally without Rales/Wheezes/Rhonchi. Cardiovascular: Regular rate and rhythm with normal S1/S2 without murmurs, rubs or gallops. Abdomen: Soft, non-tender, non-distended with normal bowel sounds. Musculoskeletal: No clubbing, cyanosis or edema bilaterally. Full range of motion without deformity. Skin: Skin color, texture, turgor normal.  No rashes or lesions. Neurologic:  Neurovascularly intact without any focal sensory/motor deficits. Cranial nerves: II-XII intact, grossly non-focal.  Psychiatric: Alert and oriented, thought content appropriate, normal insight  Capillary Refill: Brisk,< 3 seconds   Peripheral Pulses: +2 palpable, equal bilaterally       Labs:   Recent Labs     08/28/19  1724 08/29/19  0431   WBC 4.3 3.6*   HGB 9.8* 8.9*   HCT 29.4* 26.5*    273     Recent Labs     08/28/19  1724      K 4.4      CO2 24   BUN 9   CREATININE 0.8   CALCIUM 9.2     Recent Labs     08/28/19  1724   AST 15   ALT 13   BILITOT 0.3   ALKPHOS 128     Recent Labs     08/28/19  1724   INR 1.50*     Recent Labs     08/28/19  1724   TROPONINI <0.01       Urinalysis:      Lab Results   Component Value Date    NITRU Negative 08/28/2019    WBCUA 4 08/28/2019    BACTERIA 2+ 08/15/2019    RBCUA None seen 08/28/2019    BLOODU TRACE 08/28/2019    SPECGRAV 1.014 08/28/2019    GLUCOSEU Negative 08/28/2019       Radiology:  Vascular carotid duplex bilateral   Final Result      XR CHEST PORTABLE   Final Result   Stable portable study. CT Head WO Contrast   Final Result   No acute intracranial abnormality.       Diffuse atrophic changes with findings suggesting chronic microvascular   ischemia         MRI brain without contrast    (Results Pending)           Assessment/Plan:    Active Hospital Problems    Diagnosis    Encephalopathy acute [G93.40]    Bipolar 1 disorder ([address] [F31.9]    Severe major depression ([address] [F32.2]    Weakness [R53.1]    Bone metastasis ([address] [C79.51]    Neoplasm related pain [G89.3]

## 2019-08-30 NOTE — PROGRESS NOTES
Comments: RW     Cognition  Overall Cognitive Status: Exceptions  Arousal/Alertness: Delayed responses to stimuli  Following Commands: Follows one step commands with repetition; Follows one step commands with increased time([place])  Attention Span: Difficulty dividing attention  Memory: Decreased short term memory  Safety Judgement: Decreased awareness of need for safety  Problem Solving: Decreased awareness of errors  Insights: Decreased awareness of deficits  Initiation: Requires cues for some  Sequencing: Requires cues for some        Sensation  Overall Sensation Status: WFL        LUE AROM (degrees)  LUE AROM : WFL  RUE AROM (degrees)  RUE AROM : WFL  LUE Strength  LUE Strength Comment: Grossly 4-/5 B shoulder flexion, abduction, distal 4/4  RUE Strength  RUE Strength Comment: Grossly 4-/5 B shoulder flexion, abduction, distal 4/4                   Plan   Plan  Times per week: 1-2x  Times per day: Daily  Current Treatment Recommendations: Strengthening, Balance Training, Self-Care / ADL             AM-PAC Score        AM-[facility] Inpatient Daily Activity Raw Score: 19 (08/30/19 1434)  AM-PAC Inpatient ADL T-Scale Score : 40.22 (08/30/19 1434)  ADL Inpatient CMS 0-100% Score: 42.8 (08/30/19 1434)  ADL Inpatient CMS G-Code Modifier : CK (08/30/19 1434)    Goals  Short term goals  Time Frame for Short term goals: LTG=STG  Short term goal 1: MOD I with functional ADL transfers  Short term goal 2: MOD I with functional ADL mobility  Short term goal 3: LB ADLs Mod I  Short term goal 4: UB ADLs MOD I  Long term goals  Time Frame for Long term goals : LTG=STG       Therapy Time   Individual Concurrent Group Co-treatment   Time In 1405         Time Out 1428         Minutes 23              Timed Code Treatment Minutes:  8 Minutes    Total Treatment Minutes:  YFN Lewis 23, OT   Bridgette Humphreys 103

## 2019-08-30 NOTE — PROGRESS NOTES
Hospitalist Progress Note      PCP: Derrick Harrison MD    Date of Admission: 8/28/2019    Chief Complaint:  [facility] Course:   Manny Abdalla is a 77 y.o. male who presented with   Mode of arrival : Walk in  Patient came with family   Reported to be more confused since yesterday   Patient is aware that he is confused . Also having gait issues @ home and seemingly more weaker than his baseline . Is s/p completion of course of Abx for recent UTI   D/larisa on 8/21/19 from last admission  Is on Chemo RX as OP     Subjective:   Much more alert today  Looks like a different person. Medications:  Reviewed    Infusion Medications    sodium chloride 75 mL/hr at 08/30/19 0254     Scheduled Medications    ARIPiprazole  10 mg Oral Daily    aspirin  81 mg Oral Daily    atorvastatin  20 mg Oral Nightly    metoprolol tartrate  25 mg Oral BID    pantoprazole  40 mg Oral Daily    potassium chloride  20 mEq Oral Daily    venlafaxine  150 mg Oral Daily with breakfast    sodium chloride flush  10 mL Intravenous 2 times per day    enoxaparin  40 mg Subcutaneous Daily    dexamethasone  2 mg Oral 2 times per day    morphine  30 mg Oral TID     PRN Meds: albuterol, oxyCODONE-acetaminophen, sodium chloride flush, magnesium hydroxide, labetalol, LORazepam, ondansetron    No intake or output data in the 24 hours ending 08/30/19 1722    Physical Exam Performed:    BP (!) 153/20   Pulse 52   Temp 98.3 °F (36.8 °C) (Oral)   Resp 15   Ht 5' 10\" (1.778 m)   Wt 138 lb 6.4 oz (62.8 kg)   SpO2 95%   BMI 19.86 kg/m²     General appearance: No apparent distress, appears stated age and cooperative. HEENT: Pupils equal, round, and reactive to light. Conjunctivae/corneas clear. Neck: Supple, with full range of motion. No jugular venous distention. Trachea midline. Respiratory:  Normal respiratory effort. Clear to auscultation, bilaterally without Rales/Wheezes/Rhonchi.   Cardiovascular: Regular rate and rhythm with

## 2019-08-31 NOTE — PROGRESS NOTES
or significant change from the prior radiograph. Vascular Carotid Duplex Bilateral    Result Date: 8/29/2019  Carotid Duplex Study  Demographics   Patient Name      Charity Stevens   Date of Study     08/29/2019          Gender              Male   Patient Number    [id number]          Date of Birth       1950   Visit Number      [id number]           Age                 77 year(s)   Accession Number  [id number]           Room Number         9014   Corporate ID      [id number]             Sonographer         JOSE CalderonT   Ordering          Connor Schreiber, Interpreting        I Vascular  Physician         MD                  Physician           Lis Bingham MD,                                                            [facility]  Procedure Type of Study:   Cerebral:Carotid, VL CAROTID DUPLEX BILATERAL. Vascular Sonographer Report  Additional Indications:Confusion, TIA vs CVA Impressions Right Impression The right internal carotid artery appears to have a <50% diameter reducing stenosis based on velocity criteria. The right vertebral artery demonstrates normal antegrade flow. The right subclavian artery is visualized and demonstrates multiphasic flow. The right brachial pressure was not obtained due to IV placement . Left Impression The left internal carotid artery appears to have a <50% diameter reducing stenosis based on velocity criteria. The left vertebral artery demonstrates normal antegrade flow. The left subclavian artery is visualized and demonstrates multiphasic flow. Conclusions   Summary   -The right internal carotid artery appears to have a <50% diameter reducing  stenosis based on velocity criteria. -The left internal carotid artery appears to have a <50% diameter reducing  stenosis based on velocity criteria. Recommendations   -Recommend follow-up study in 12 months.    Signature

## 2019-08-31 NOTE — PLAN OF CARE
Problem: Falls - Risk of:  Goal: Will remain free from falls  Description  Will remain free from falls  8/31/2019 0852 by Kannan Duncan RN  Outcome: Ongoing  Note:   Pt remains free from falls. Safety precautions in place. Bed in lowest position, bed wheels locked, call light with in reach, bed alarm on, yellow blanket in place, fall risk wrist band on, SAFE outside of doorway. Will continue to monitor. Problem: Falls - Risk of:  Goal: Absence of physical injury  Description  Absence of physical injury  Outcome: Ongoing     Problem: Pain:  Goal: Pain level will decrease  Description  Pain level will decrease  8/31/2019 0852 by Kannan Duncan RN  Outcome: Ongoing  Note:   Pt has both scheduled and PRN medication for pain control. Problem: Pain:  Goal: Control of acute pain  Description  Control of acute pain  8/31/2019 0852 by Kannan Duncan RN  Note:   Pt given percocet for acute pain control. Problem: Pain:  Goal: Control of chronic pain  Description  Control of chronic pain  Outcome: Ongoing  Note:   Scheduled MS contin for chronic pain control. Problem: HEMODYNAMIC STATUS  Goal: Patient has stable vital signs and fluid balance  8/31/2019 0852 by Kannan Duncan RN  Outcome: Ongoing  Note:   Pt vital signs will be assessed per floor protocol and as needed. Medications will be administered accordingly to keep vitals signs within parameters that are normal or that are chosen by MD.         Problem: ACTIVITY INTOLERANCE/IMPAIRED MOBILITY  Goal: Mobility/activity is maintained at optimum level for patient  8/31/2019 3456 by Kannan Duncan RN  Outcome: Ongoing  Note:   Pt on bedrest. Pt can turn himself in the bed.

## 2019-08-31 NOTE — PROGRESS NOTES
Assessment complete. VSS no SOB or any distress noted. Ativan given for tremors and rest. Dressing change on suprapubic catheter taped down so it won't pulll. POC discussed with pt and wife. Call light within reach, pt encouraged to call if any needs arise. No further requests at this time. Will continue to monitor.

## 2019-08-31 NOTE — PROGRESS NOTES
Data- discharge order received, pt verbalized agreement to discharge, needs for [facility] with [facility] for home care services and therapy and/or new Durable Medical Equipment. DOROTHY reviewed and signed by MD, to be completed by RN. Action- AVS prepared, discharge instructions prepared and given to patient and wife, medication information packet given r/t NEW or CHANGED prescriptions, pt verbalized understanding further self-review. D/C instruction summary: Diet- General, Activity- as tolerated, follow up with Primary Care Physician Ines Boswell [phone] appointment to be made as needed with PCP and oncology, immunizations reviewed and up to date, medications prescriptions to be filled by preferred pharmacy. Inpatient surgical procedural reviewed. Contact information provided to above agencies used. Response- Case Management/ reported faxing completed DOROTHY and AVS to needed HHC/DME services stated above. Pt belongings gathered, IV removed, pt dressed, telemetry monitor removed. Disposition is home with HHC/DME as stated above, transported with belongings, taken to lobby via w/c with this RN and spouse, no complications.

## 2019-09-03 NOTE — PROGRESS NOTES
Speech Language Pathology    Discharge  Name: Sean Lane  : 1950  Medical Diagnosis: Encephalopathy acute [G93.40]  Encephalopathy acute [G93.40]  Encephalopathy acute [G93.40]  Treatment Diagnosis: Dysphagia    Speech Therapy/Dysphagia  Pt discharged to home on 2019. Bedside Swallow and Speech Langauge Eval completed 2019 (see report). No goals met prior to discharge. Recommend: Continued Dysphagia and Speech Language treatment at home, with goals and treatment per Plan of Care. DIET LEVEL AT DISCHARGE:  1) Recommend advance to Regular texture diet with thin liquids, meds as tolerated   2) If respiratory status declines recommend downgrade to Nectar thick liquids     GOALS ADDRESSED:  Dysphagia Treatment/Goals  1.) Pt will tolerate recommended diet without s/s of aspiration (GOAL NOT MET)   2.) If clinical symptoms of penetration/aspiration continue to be noted,Pt will tolerate MBS to r/o aspiration and determine appropriate diet/liquid level. (GOAL NOT MET)     Speech Language Short-term goals: 1. Pt will participate in ongoing assessment of cognition with goals to be established as indicated (GOAL NOT MET)   2. Pt will improve short term recall via graded tasks to 80%(GOAL NOT MET)      Bebeto De Souza MA [address] [place] [address]  Speech Language Pathologist

## 2019-09-13 NOTE — DISCHARGE SUMMARY
Zari Knows monitor and adjust medications prn. For now, resumed home medications of Pain medications to avoid withdrawal symptoms . May need to de escalate slowly if pain medications related to confusion issues      Recent UTI : completed Rx. Rpt UA @ admission much better than last admission   - no growth this time around     Prolonged QTC : EKG QD prn . Check echo  . Avoid QT prolonging agents . Cardiac monitor . ? Cardiology c/s      Anxiety and depression : Will monitor and adjust medications prn. For now, resumed home medications      CAD / S/p CABG : Will monitor and adjust medications prn. For now, resumed home medications of ASA and statins and BB      Mixed Hyperlipidemia : Will monitor and adjust medications prn. For now, resumed home medications of statins      ANN      COPD : No active issues currently. Stable.      GERD/PUD : Will monitor and adjust medications prn. For now, resumed home medications of PPI      Suprapubic catheter +      H/o TIAs : Will monitor and adjust medications prn. For now, resumed home medications of ASA and statins          Patient is doing well, is at baseline and stable for d/c home with home care. Discharge Medications:   Discharge Medication List as of 8/31/2019  1:05 PM        Discharge Medication List as of 8/31/2019  1:05 PM        Discharge Medication List as of 8/31/2019  1:05 PM      CONTINUE these medications which have NOT CHANGED    Details   ARIPiprazole (ABILIFY) 10 MG tablet TAKE 1 TABLET BY MOUTH EVERY DAY, Disp-30 tablet, R-0Needs appointment for further refills. Normal      atorvastatin (LIPITOR) 20 MG tablet TAKE ONE TABLET BY MOUTH ONCE NIGHTLY, Disp-90 tablet, R-3Normal      metoprolol tartrate (LOPRESSOR) 25 MG tablet TAKE 1 TABLET BY MOUTH TWICE A DAY, Disp-60 tablet, R-0Needs appointment for further refills. Normal      promethazine (PHENERGAN) 25 MG tablet Take 1 tablet by mouth every 6 hours as needed for Nausea WARNING:  May cause drowsiness. May impair ability to operate vehicles or machinery. Do not use in combination with alcohol., Disp-20 tablet, R-0Print      LORazepam (ATIVAN) 1 MG tablet Take 1 mg by mouth every 6 hours as needed for Anxiety. Indications: UNSURE OF DOSAGEHistorical Med      naloxone 4 MG/0.1ML LIQD nasal spray 1 spray by Nasal route as needed for Opioid Reversal, Disp-1 each, R-5Normal      morphine (MS CONTIN) 30 MG extended release tablet Take 30 mg by mouth 3 times daily. Historical Med      oxyCODONE-acetaminophen (PERCOCET)  MG per tablet Take 1 tablet by mouth every 4-6 hours as needed for Pain. Historical Med      pantoprazole (PROTONIX) 40 MG tablet Take 40 mg by mouth dailyHistorical Med      venlafaxine (EFFEXOR XR) 150 MG extended release capsule TAKE 2 CAPSULES BY MOUTH EVERY DAY, Disp-180 capsule, R-1Normal      albuterol (PROVENTIL) (5 MG/ML) 0.5% nebulizer solution Take 1 mL by nebulization 4 times daily as needed for Wheezing, Disp-120 each, R-3Normal      Blood Pressure Monitoring (BLOOD PRESSURE MONITOR/S CUFF) MISC DAILY Starting Tue 12/18/2018, Disp-1 each, R-0, Print      hydrocortisone 2.5 % cream Apply topically every other day Apply topically 2 times daily. , Topical, EVERY OTHER DAY, Historical Med      umeclidinium-vilanterol (ANORO ELLIPTA) 62.5-25 MCG/INH AEPB inhaler Inhale 1 puff into the lungs daily, Disp-1 each, R-3Normal      ondansetron (ZOFRAN ODT) 4 MG disintegrating tablet Take 1-2 tablets by mouth every 8 hours as needed for Nausea May substitute the non ODT tablets if not covered financially by the insurance plan., Disp-20 tablet, R-0Print      aspirin 81 MG tablet Take 1 tablet by mouth daily, Disp-30 tablet, R-3Adjust Sig      potassium chloride (KLOR-CON) 10 MEQ extended release tablet Take 2 tablets by mouth daily, Disp-60 tablet, R-3Normal           Discharge Medication List as of 8/31/2019  1:05 PM      STOP taking these medications       ciprofloxacin (CIPRO) 500 MG tablet Procedure Type of Study:   Cerebral:Carotid, VL CAROTID DUPLEX BILATERAL. Vascular Sonographer Report  Additional Indications:Confusion, TIA vs CVA Impressions Right Impression The right internal carotid artery appears to have a <50% diameter reducing stenosis based on velocity criteria. The right vertebral artery demonstrates normal antegrade flow. The right subclavian artery is visualized and demonstrates multiphasic flow. The right brachial pressure was not obtained due to IV placement . Left Impression The left internal carotid artery appears to have a <50% diameter reducing stenosis based on velocity criteria. The left vertebral artery demonstrates normal antegrade flow. The left subclavian artery is visualized and demonstrates multiphasic flow. Conclusions   Summary   -The right internal carotid artery appears to have a <50% diameter reducing  stenosis based on velocity criteria. -The left internal carotid artery appears to have a <50% diameter reducing  stenosis based on velocity criteria. Recommendations   -Recommend follow-up study in 12 months. Signature   ------------------------------------------------------------------  Electronically signed by Yazmin Espinoza MD, [facility] (Interpreting  physician) on 08/29/2019 at 04:18 PM  ------------------------------------------------------------------  Patient Status:Routine. [facility] - Vascular Lab. Technical Quality:Adequate visualization. Plaque   - A plaque was found in the Right Prox ICA. The plaque characteristics are: complicated category, medium echogenicity, minimal severity and heterogeneous texture. - A plaque was found in the Left Prox ICA. The plaque characteristics are: complicated category, high echogenicity, moderate severity and heterogeneous texture. Velocities are measured in cm/s ; Diameters are measured in mm Carotid Right Measurements +---------------+----+----+-----+----+ ! Location       ! PSV ! EDV ! [place]! [place]

## 2019-09-25 PROBLEM — R50.9 FEBRILE ILLNESS: Status: ACTIVE | Noted: 2019-01-01

## 2019-09-25 PROBLEM — N30.00 ACUTE CYSTITIS: Status: ACTIVE | Noted: 2019-01-01

## 2019-10-04 PROBLEM — J44.9 CHRONIC OBSTRUCTIVE PULMONARY DISEASE (HCC): Status: ACTIVE | Noted: 2018-03-20

## 2019-10-08 PROBLEM — E44.0 MODERATE MALNUTRITION (HCC): Chronic | Status: ACTIVE | Noted: 2019-01-01

## 2019-11-27 ENCOUNTER — TELEPHONE (OUTPATIENT)
Dept: FAMILY MEDICINE CLINIC | Age: 69
End: 2019-11-27

## (undated) DEVICE — TRAY PREP DRY W/ PREM GLV 2 APPL 6 SPNG 2 UNDPD 1 OVERWRAP

## (undated) DEVICE — BAG,DRAINAGE,4L,A/R TOWER,LL,SLIDE TAP: Brand: MEDLINE

## (undated) DEVICE — GLOVE ORANGE PI 7 1/2   MSG9075

## (undated) DEVICE — CYSTO PACK: Brand: MEDLINE INDUSTRIES, INC.

## (undated) DEVICE — CATHETER,FOLEY,SILI-ELAST,LTX,18FR,10ML: Brand: MEDLINE

## (undated) DEVICE — SOLUTION IV IRRIG POUR BRL 0.9% SODIUM CHL 2F7124

## (undated) DEVICE — O'BRIEN SUPRAPUBIC PEEL-AWAY, ACCESS SET: Brand: O'BRIEN

## (undated) DEVICE — COVER LT HNDL BLU PLAS

## (undated) DEVICE — KIT OR ROOM TURNOVER W/STRAP

## (undated) DEVICE — Z DUP USE 2522782 SOLUTION IRRIG 1000ML STRL H2O PLAS CONTAINER UROMATIC

## (undated) DEVICE — BAG DRNGE L6FT 20L PREFIL ABSRB POLYMER EXP TBNG DISP FOR

## (undated) DEVICE — SOLUTION IV IRRIG WATER 1000ML POUR BRL 2F7114

## (undated) DEVICE — Device: Brand: MEDEX

## (undated) DEVICE — DEVICE SECUREMENT AD W/ TRICOT ANCHR PD FOR F LTX SIL CATH

## (undated) DEVICE — CYSTO/BLADDER IRRIGATION SET, REGULATING CLAMP